# Patient Record
Sex: MALE | Race: BLACK OR AFRICAN AMERICAN | NOT HISPANIC OR LATINO | URBAN - METROPOLITAN AREA
[De-identification: names, ages, dates, MRNs, and addresses within clinical notes are randomized per-mention and may not be internally consistent; named-entity substitution may affect disease eponyms.]

---

## 2024-08-16 ENCOUNTER — EMERGENCY (EMERGENCY)
Facility: HOSPITAL | Age: 69
LOS: 0 days | Discharge: HOME | End: 2024-08-16
Attending: STUDENT IN AN ORGANIZED HEALTH CARE EDUCATION/TRAINING PROGRAM
Payer: MEDICARE

## 2024-08-16 VITALS
OXYGEN SATURATION: 96 % | HEART RATE: 58 BPM | RESPIRATION RATE: 11 BRPM | TEMPERATURE: 98 F | DIASTOLIC BLOOD PRESSURE: 85 MMHG | SYSTOLIC BLOOD PRESSURE: 143 MMHG

## 2024-08-16 VITALS
TEMPERATURE: 99 F | HEIGHT: 72 IN | HEART RATE: 65 BPM | SYSTOLIC BLOOD PRESSURE: 142 MMHG | RESPIRATION RATE: 18 BRPM | OXYGEN SATURATION: 98 % | DIASTOLIC BLOOD PRESSURE: 84 MMHG | WEIGHT: 186.95 LBS

## 2024-08-16 DIAGNOSIS — R07.9 CHEST PAIN, UNSPECIFIED: ICD-10-CM

## 2024-08-16 LAB
ALBUMIN SERPL ELPH-MCNC: 3.3 G/DL — SIGNIFICANT CHANGE UP (ref 3.3–5)
ALP SERPL-CCNC: 96 U/L — SIGNIFICANT CHANGE UP (ref 40–120)
ALT FLD-CCNC: 22 U/L — SIGNIFICANT CHANGE UP (ref 12–78)
ANION GAP SERPL CALC-SCNC: 6 MMOL/L — SIGNIFICANT CHANGE UP (ref 5–17)
AST SERPL-CCNC: 14 U/L — LOW (ref 15–37)
BASOPHILS # BLD AUTO: 0.03 K/UL — SIGNIFICANT CHANGE UP (ref 0–0.2)
BASOPHILS NFR BLD AUTO: 0.4 % — SIGNIFICANT CHANGE UP (ref 0–2)
BILIRUB SERPL-MCNC: 0.3 MG/DL — SIGNIFICANT CHANGE UP (ref 0.2–1.2)
BUN SERPL-MCNC: 17 MG/DL — SIGNIFICANT CHANGE UP (ref 7–23)
CALCIUM SERPL-MCNC: 9.1 MG/DL — SIGNIFICANT CHANGE UP (ref 8.5–10.1)
CHLORIDE SERPL-SCNC: 110 MMOL/L — HIGH (ref 96–108)
CO2 SERPL-SCNC: 24 MMOL/L — SIGNIFICANT CHANGE UP (ref 22–31)
CREAT SERPL-MCNC: 0.86 MG/DL — SIGNIFICANT CHANGE UP (ref 0.5–1.3)
EGFR: 93 ML/MIN/1.73M2 — SIGNIFICANT CHANGE UP
EOSINOPHIL # BLD AUTO: 0.16 K/UL — SIGNIFICANT CHANGE UP (ref 0–0.5)
EOSINOPHIL NFR BLD AUTO: 2.1 % — SIGNIFICANT CHANGE UP (ref 0–6)
GLUCOSE SERPL-MCNC: 95 MG/DL — SIGNIFICANT CHANGE UP (ref 70–99)
HCT VFR BLD CALC: 40.5 % — SIGNIFICANT CHANGE UP (ref 39–50)
HGB BLD-MCNC: 13.5 G/DL — SIGNIFICANT CHANGE UP (ref 13–17)
HIV 1 & 2 AB SERPL IA.RAPID: SIGNIFICANT CHANGE UP
IMM GRANULOCYTES NFR BLD AUTO: 0.3 % — SIGNIFICANT CHANGE UP (ref 0–0.9)
LYMPHOCYTES # BLD AUTO: 2.12 K/UL — SIGNIFICANT CHANGE UP (ref 1–3.3)
LYMPHOCYTES # BLD AUTO: 28 % — SIGNIFICANT CHANGE UP (ref 13–44)
MCHC RBC-ENTMCNC: 27.8 PG — SIGNIFICANT CHANGE UP (ref 27–34)
MCHC RBC-ENTMCNC: 33.3 G/DL — SIGNIFICANT CHANGE UP (ref 32–36)
MCV RBC AUTO: 83.5 FL — SIGNIFICANT CHANGE UP (ref 80–100)
MONOCYTES # BLD AUTO: 0.66 K/UL — SIGNIFICANT CHANGE UP (ref 0–0.9)
MONOCYTES NFR BLD AUTO: 8.7 % — SIGNIFICANT CHANGE UP (ref 2–14)
NEUTROPHILS # BLD AUTO: 4.59 K/UL — SIGNIFICANT CHANGE UP (ref 1.8–7.4)
NEUTROPHILS NFR BLD AUTO: 60.5 % — SIGNIFICANT CHANGE UP (ref 43–77)
NRBC # BLD: 0 /100 WBCS — SIGNIFICANT CHANGE UP (ref 0–0)
PLATELET # BLD AUTO: 244 K/UL — SIGNIFICANT CHANGE UP (ref 150–400)
POTASSIUM SERPL-MCNC: 4.2 MMOL/L — SIGNIFICANT CHANGE UP (ref 3.5–5.3)
POTASSIUM SERPL-SCNC: 4.2 MMOL/L — SIGNIFICANT CHANGE UP (ref 3.5–5.3)
PROT SERPL-MCNC: 7.1 GM/DL — SIGNIFICANT CHANGE UP (ref 6–8.3)
RBC # BLD: 4.85 M/UL — SIGNIFICANT CHANGE UP (ref 4.2–5.8)
RBC # FLD: 13.4 % — SIGNIFICANT CHANGE UP (ref 10.3–14.5)
SODIUM SERPL-SCNC: 140 MMOL/L — SIGNIFICANT CHANGE UP (ref 135–145)
TROPONIN I, HIGH SENSITIVITY RESULT: 6.9 NG/L — SIGNIFICANT CHANGE UP
WBC # BLD: 7.58 K/UL — SIGNIFICANT CHANGE UP (ref 3.8–10.5)
WBC # FLD AUTO: 7.58 K/UL — SIGNIFICANT CHANGE UP (ref 3.8–10.5)

## 2024-08-16 PROCEDURE — 93010 ELECTROCARDIOGRAM REPORT: CPT

## 2024-08-16 PROCEDURE — 99285 EMERGENCY DEPT VISIT HI MDM: CPT

## 2024-08-16 PROCEDURE — 71045 X-RAY EXAM CHEST 1 VIEW: CPT | Mod: 26

## 2024-08-16 NOTE — ED PROVIDER NOTE - CARE PROVIDER_API CALL
Jaspreet Talbot  Internal Medicine  [address] [place] [address]  Phone: [phone]  Fax: [phone]  Follow Up Time: 4-6 Days    Marc Ac  Cardiovascular Disease  [address] [place] [address]  Phone: [phone]  Fax: [phone]  Follow Up Time: 7-10 Days    Ernesto Silverio  Cardiovascular Disease  [address] [place] [address]  Phone: [phone]  Fax: [phone]  Follow Up Time: 7-10 Days

## 2024-08-16 NOTE — ED PROVIDER NOTE - PHYSICAL EXAMINATION
General: Well appearing male in no acute distress  HEENT: Normocephalic, atraumatic. Moist mucous membranes. Oropharynx clear. No lymphadenopathy.  Eyes: No scleral icterus. EOMI. ANNALISE.  Neck:. Soft and supple. Full ROM without pain. No midline tenderness  Cardiac: Regular rate and regular rhythm. No murmurs, rubs, gallops. Peripheral pulses 2+ and symmetric. No LE edema.  Resp: Lungs CTAB. Speaking in full sentences. No wheezes, rales or rhonchi.  Abd: Soft, non-tender, non-distended. No guarding or rebound. No scars, masses, or lesions.  Back: Spine midline and non-tender. No CVA tenderness.    Skin: No rashes, abrasions, or lacerations.  Neuro: AO x 3. Moves all extremities symmetrically. Motor strength and sensation grossly intact.

## 2024-08-16 NOTE — ED ADULT TRIAGE NOTE - WEIGHT METHOD
The patient has been having increasing episodes of atrial fibrillation which now become much more symptomatic.  Options were discussed including change antiarrhythmics as well as repeat catheter ablation.  His only reasonable option for antiarrhythmic is probably amiodarone.    He is interested in considering repeat catheter ablation.  Discussed the procedure in detail and that its not clear how involved the procedure would be until we get see if there is recurrences at previous sites of ablation.  Although some of his increased burden can be attributed to increased stress recently, his overall burden of atrial fibrillation has been slowly increasing for quite some time.    Patient rested in proceeding with catheter ablation.  This will be arranged in near future.   actual

## 2024-08-16 NOTE — ED PROVIDER NOTE - CARE PROVIDERS DIRECT ADDRESSES
[email or web address]

## 2024-08-16 NOTE — ED PROVIDER NOTE - OBJECTIVE STATEMENT
70 y/o M no pmhx presents w/ chest pain for past few days. states it is present predominantly when he wakes up. denies sob. denies abdominal pain. denies fever/chills. denies trauma. no active chest pain. states pain goes way during the day. states he is active person - ran a marathon.

## 2024-08-16 NOTE — ED PROVIDER NOTE - PROVIDER TOKENS
PROVIDER:[TOKEN:[[id number]],FOLLOWUP:[4-6 Days]],PROVIDER:[TOKEN:[[id number]],FOLLOWUP:[7-10 Days]],PROVIDER:[TOKEN:[[id number]],FOLLOWUP:[7-10 Days]]

## 2024-08-16 NOTE — ED PROVIDER NOTE - NSFOLLOWUPINSTRUCTIONS_ED_ALL_ED_FT
followup with primary care/ cardiologist in next 1-7 days    Chest Pain    Chest pain can be caused by many different conditions which may or may not be dangerous. Causes include heartburn, lung infections, heart attack, blood clot in lungs, skin infections, strain or damage to muscle, cartilage, or bones, etc. In addition to a history and physical examination, an electrocardiogram (ECG) or other lab tests may have been performed to determine the cause of your chest pain. Follow up with your primary care provider or with a cardiologist as instructed.     SEEK IMMEDIATE MEDICAL CARE IF YOU HAVE ANY OF THE FOLLOWING SYMPTOMS: worsening chest pain, coughing up blood, unexplained back/neck/jaw pain, severe abdominal pain, dizziness or lightheadedness, fainting, shortness of breath, sweaty or clammy skin, vomiting, or racing heart beat. These symptoms may represent a serious problem that is an emergency. Do not wait to see if the symptoms will go away. Get medical help right away. Call 911 and do not drive yourself to the hospital.

## 2024-08-16 NOTE — ED ADULT NURSE NOTE - OBJECTIVE STATEMENT
Patient reports "for past couple days, when I wake up, I have left sided chest pain and it feels like heartburn." No pain currently. Denies PMH. Last episode this morning.

## 2024-08-16 NOTE — ED PROVIDER NOTE - PATIENT PORTAL LINK FT
You can access the FollowMyHealth Patient Portal offered by [facility] by registering at the following website: [email or web address]. By joining [employer]’s FollowMyHealth portal, you will also be able to view your health information using other applications (apps) compatible with our system.

## 2024-08-16 NOTE — ED ADULT NURSE NOTE - NSFALLUNIVINTERV_ED_ALL_ED
Bed/Stretcher in lowest position, wheels locked, appropriate side rails in place/Call bell, personal items and telephone in reach/Instruct patient to call for assistance before getting out of bed/chair/stretcher/Non-slip footwear applied when patient is off stretcher/[place] to call system/Physically safe environment - no spills, clutter or unnecessary equipment/Purposeful proactive rounding/Room/bathroom lighting operational, light cord in reach

## 2024-08-16 NOTE — ED PROVIDER NOTE - CLINICAL SUMMARY MEDICAL DECISION MAKING FREE TEXT BOX
68 y/o M no pmhx presents w/ chest pain for past few days. states it is present predominantly when he wakes up. denies sob. denies abdominal pain. denies fever/chills. denies trauma. no active chest pain. states pain goes way during the day. states he is active person - ran a marathon.  EKG NSR, no stemi  low suspicion of acs  consider msk  check cxr - r/o pna/ptx   check labs  heart score is low. 68 y/o M no pmhx presents w/ chest pain for past few days. states it is present predominantly when he wakes up. denies sob. denies abdominal pain. denies fever/chills. denies trauma. no active chest pain. states pain goes way during the day. states he is active person - ran a marathon.  EKG NSR, no stemi  low suspicion of acs  consider msk  check cxr - r/o pna/ptx   check labs  heart score is low.    no active chest pain. results reviewed. heart score. pcp/cards f/u emphasized.   Conversation had with patient regarding results of testing. Patient agrees with plan for discharge at this time. Patient agrees to comply with follow up with PCP. Return to ED precautions and discharge instructions given to patient.

## 2024-08-17 LAB
HCV AB S/CO SERPL IA: 0.11 S/CO — SIGNIFICANT CHANGE UP (ref 0–0.99)
HCV AB SERPL-IMP: SIGNIFICANT CHANGE UP

## 2024-08-21 ENCOUNTER — APPOINTMENT (OUTPATIENT)
Dept: CARDIOLOGY | Facility: CLINIC | Age: 69
End: 2024-08-21

## 2024-08-28 ENCOUNTER — NON-APPOINTMENT (OUTPATIENT)
Age: 69
End: 2024-08-28

## 2024-08-28 ENCOUNTER — APPOINTMENT (OUTPATIENT)
Dept: CARDIOLOGY | Facility: CLINIC | Age: 69
End: 2024-08-28
Payer: MEDICARE

## 2024-08-28 VITALS
WEIGHT: 187 LBS | TEMPERATURE: 98 F | BODY MASS INDEX: 25.33 KG/M2 | SYSTOLIC BLOOD PRESSURE: 143 MMHG | HEART RATE: 88 BPM | DIASTOLIC BLOOD PRESSURE: 92 MMHG | OXYGEN SATURATION: 99 % | HEIGHT: 72 IN

## 2024-08-28 DIAGNOSIS — R94.31 ABNORMAL ELECTROCARDIOGRAM [ECG] [EKG]: ICD-10-CM

## 2024-08-28 DIAGNOSIS — R07.89 OTHER CHEST PAIN: ICD-10-CM

## 2024-08-28 PROBLEM — Z00.00 ENCOUNTER FOR PREVENTIVE HEALTH EXAMINATION: Status: ACTIVE | Noted: 2024-08-28

## 2024-08-28 PROCEDURE — 99204 OFFICE O/P NEW MOD 45 MIN: CPT

## 2024-08-28 PROCEDURE — 93000 ELECTROCARDIOGRAM COMPLETE: CPT

## 2024-08-28 NOTE — REVIEW OF SYSTEMS
[Fever] : no fever [Headache] : no headache [Weight Gain (___ Lbs)] : no recent weight gain [Chills] : no chills [Feeling Fatigued] : not feeling fatigued [Weight Loss (___ Lbs)] : no recent weight loss [Blurry Vision] : no blurred vision [SOB] : no shortness of breath [Dyspnea on exertion] : not dyspnea during exertion [Chest Discomfort] : chest discomfort [Lower Ext Edema] : no extremity edema [Palpitations] : no palpitations [Orthopnea] : no orthopnea [Syncope] : no syncope [Cough] : no cough [Wheezing] : no wheezing [Nausea] : no nausea [Vomiting] : no vomiting [Dizziness] : no dizziness [Confusion] : no confusion was observed [Easy Bleeding] : no tendency for easy bleeding [Easy Bruising] : no tendency for easy bruising [FreeTextEntry5] : see hpi

## 2024-08-28 NOTE — HISTORY OF PRESENT ILLNESS
[FreeTextEntry1] : 69M presents to establish care Sent in by: PMD:  pt states last month he experienced a few days of cp on the left side with RUE numbness when he woke up in the morning. symptoms were intermittent, and pt attributed it to heartburn, then completely resolved. last episode was a month ago pt did go to the ER with these symptoms and reports he was told he was fine and sent home  no CP, SOB on exertion. Denies palpitations, dizziness, diaphoresis, syncope, LE edema, orthopnea pt is a marathon runner, but hasn't run over the past month bc he was nervous  Exercise: long distance runner Diet: none  Prev cardiac history: none Previous cardiac testing: echo/stress 2019, normal per pt  Recent labs:  EKG:  Med hx: none	 Sx hx: knee sx 2009	 Family hx: no known cardiac hx Social hx:  lives in [place] with wife, retired fashion industry. no tob/etoh/drugs Meds: none Allergies: nkda

## 2024-08-28 NOTE — DISCUSSION/SUMMARY
[FreeTextEntry1] : 69M presents to establish care  CP -atypical -unclear if on exertion, pt stopped exercising when symptoms started as he was nervous -no active CP -pt comfortable appearing and euvolemic -ekg showing SR with non specific st cahges inferiorly  -will check TTE to r/o structural heart dz -will check exercise stress test to r/o ischemia  f/u after initial testing 50 min spent on complete encounter.    [EKG obtained to assist in diagnosis and management of assessed problem(s)] : EKG obtained to assist in diagnosis and management of assessed problem(s)

## 2024-09-19 ENCOUNTER — APPOINTMENT (OUTPATIENT)
Dept: CARDIOLOGY | Facility: CLINIC | Age: 69
End: 2024-09-19

## 2024-11-10 ENCOUNTER — INPATIENT (INPATIENT)
Facility: HOSPITAL | Age: 69
LOS: 3 days | Discharge: HOME HEALTH | End: 2024-11-14
Attending: STUDENT IN AN ORGANIZED HEALTH CARE EDUCATION/TRAINING PROGRAM | Admitting: STUDENT IN AN ORGANIZED HEALTH CARE EDUCATION/TRAINING PROGRAM
Payer: MEDICARE

## 2024-11-10 VITALS
SYSTOLIC BLOOD PRESSURE: 131 MMHG | TEMPERATURE: 98 F | DIASTOLIC BLOOD PRESSURE: 87 MMHG | WEIGHT: 186.07 LBS | OXYGEN SATURATION: 100 % | HEART RATE: 151 BPM | RESPIRATION RATE: 16 BRPM

## 2024-11-10 DIAGNOSIS — I48.92 UNSPECIFIED ATRIAL FLUTTER: ICD-10-CM

## 2024-11-10 DIAGNOSIS — I50.21 ACUTE SYSTOLIC (CONGESTIVE) HEART FAILURE: ICD-10-CM

## 2024-11-10 DIAGNOSIS — Z29.9 ENCOUNTER FOR PROPHYLACTIC MEASURES, UNSPECIFIED: ICD-10-CM

## 2024-11-10 DIAGNOSIS — R74.01 ELEVATION OF LEVELS OF LIVER TRANSAMINASE LEVELS: ICD-10-CM

## 2024-11-10 DIAGNOSIS — R55 SYNCOPE AND COLLAPSE: ICD-10-CM

## 2024-11-10 LAB
A1C WITH ESTIMATED AVERAGE GLUCOSE RESULT: 5.7 % — HIGH (ref 4–5.6)
ADD ON TEST-SPECIMEN IN LAB: SIGNIFICANT CHANGE UP
ALBUMIN SERPL ELPH-MCNC: 3.6 G/DL — SIGNIFICANT CHANGE UP (ref 3.3–5)
ALBUMIN SERPL ELPH-MCNC: 3.9 G/DL — SIGNIFICANT CHANGE UP (ref 3.3–5)
ALP SERPL-CCNC: 103 U/L — SIGNIFICANT CHANGE UP (ref 40–120)
ALP SERPL-CCNC: 110 U/L — SIGNIFICANT CHANGE UP (ref 40–120)
ALT FLD-CCNC: 89 U/L — HIGH (ref 4–41)
ALT FLD-CCNC: 91 U/L — HIGH (ref 4–41)
ANION GAP SERPL CALC-SCNC: 12 MMOL/L — SIGNIFICANT CHANGE UP (ref 7–14)
ANION GAP SERPL CALC-SCNC: 14 MMOL/L — SIGNIFICANT CHANGE UP (ref 7–14)
APTT BLD: 29.5 SEC — SIGNIFICANT CHANGE UP (ref 24.5–35.6)
AST SERPL-CCNC: 63 U/L — HIGH (ref 4–40)
AST SERPL-CCNC: 66 U/L — HIGH (ref 4–40)
BASOPHILS # BLD AUTO: 0.02 K/UL — SIGNIFICANT CHANGE UP (ref 0–0.2)
BASOPHILS # BLD AUTO: 0.04 K/UL — SIGNIFICANT CHANGE UP (ref 0–0.2)
BASOPHILS NFR BLD AUTO: 0.3 % — SIGNIFICANT CHANGE UP (ref 0–2)
BASOPHILS NFR BLD AUTO: 0.6 % — SIGNIFICANT CHANGE UP (ref 0–2)
BILIRUB SERPL-MCNC: 0.8 MG/DL — SIGNIFICANT CHANGE UP (ref 0.2–1.2)
BILIRUB SERPL-MCNC: 1 MG/DL — SIGNIFICANT CHANGE UP (ref 0.2–1.2)
BUN SERPL-MCNC: 19 MG/DL — SIGNIFICANT CHANGE UP (ref 7–23)
BUN SERPL-MCNC: 22 MG/DL — SIGNIFICANT CHANGE UP (ref 7–23)
CALCIUM SERPL-MCNC: 8.9 MG/DL — SIGNIFICANT CHANGE UP (ref 8.4–10.5)
CALCIUM SERPL-MCNC: 9 MG/DL — SIGNIFICANT CHANGE UP (ref 8.4–10.5)
CHLORIDE SERPL-SCNC: 106 MMOL/L — SIGNIFICANT CHANGE UP (ref 98–107)
CHLORIDE SERPL-SCNC: 107 MMOL/L — SIGNIFICANT CHANGE UP (ref 98–107)
CHOLEST SERPL-MCNC: 144 MG/DL — SIGNIFICANT CHANGE UP
CO2 SERPL-SCNC: 20 MMOL/L — LOW (ref 22–31)
CO2 SERPL-SCNC: 21 MMOL/L — LOW (ref 22–31)
CREAT SERPL-MCNC: 1.1 MG/DL — SIGNIFICANT CHANGE UP (ref 0.5–1.3)
CREAT SERPL-MCNC: 1.16 MG/DL — SIGNIFICANT CHANGE UP (ref 0.5–1.3)
D DIMER BLD IA.RAPID-MCNC: 336 NG/ML DDU — HIGH
EGFR: 68 ML/MIN/1.73M2 — SIGNIFICANT CHANGE UP
EGFR: 72 ML/MIN/1.73M2 — SIGNIFICANT CHANGE UP
EOSINOPHIL # BLD AUTO: 0.02 K/UL — SIGNIFICANT CHANGE UP (ref 0–0.5)
EOSINOPHIL # BLD AUTO: 0.05 K/UL — SIGNIFICANT CHANGE UP (ref 0–0.5)
EOSINOPHIL NFR BLD AUTO: 0.3 % — SIGNIFICANT CHANGE UP (ref 0–6)
EOSINOPHIL NFR BLD AUTO: 0.7 % — SIGNIFICANT CHANGE UP (ref 0–6)
ESTIMATED AVERAGE GLUCOSE: 117 — SIGNIFICANT CHANGE UP
FLUAV AG NPH QL: SIGNIFICANT CHANGE UP
FLUBV AG NPH QL: SIGNIFICANT CHANGE UP
GLUCOSE SERPL-MCNC: 123 MG/DL — HIGH (ref 70–99)
GLUCOSE SERPL-MCNC: 148 MG/DL — HIGH (ref 70–99)
HCT VFR BLD CALC: 41.9 % — SIGNIFICANT CHANGE UP (ref 39–50)
HCT VFR BLD CALC: 43.4 % — SIGNIFICANT CHANGE UP (ref 39–50)
HDLC SERPL-MCNC: 32 MG/DL — LOW
HGB BLD-MCNC: 13.4 G/DL — SIGNIFICANT CHANGE UP (ref 13–17)
HGB BLD-MCNC: 13.8 G/DL — SIGNIFICANT CHANGE UP (ref 13–17)
IANC: 4.77 K/UL — SIGNIFICANT CHANGE UP (ref 1.8–7.4)
IANC: 5.58 K/UL — SIGNIFICANT CHANGE UP (ref 1.8–7.4)
IMM GRANULOCYTES NFR BLD AUTO: 0.1 % — SIGNIFICANT CHANGE UP (ref 0–0.9)
IMM GRANULOCYTES NFR BLD AUTO: 0.3 % — SIGNIFICANT CHANGE UP (ref 0–0.9)
INR BLD: 1.16 RATIO — SIGNIFICANT CHANGE UP (ref 0.85–1.16)
LIPID PNL WITH DIRECT LDL SERPL: 90 MG/DL — SIGNIFICANT CHANGE UP
LYMPHOCYTES # BLD AUTO: 1.15 K/UL — SIGNIFICANT CHANGE UP (ref 1–3.3)
LYMPHOCYTES # BLD AUTO: 1.69 K/UL — SIGNIFICANT CHANGE UP (ref 1–3.3)
LYMPHOCYTES # BLD AUTO: 16.1 % — SIGNIFICANT CHANGE UP (ref 13–44)
LYMPHOCYTES # BLD AUTO: 23.7 % — SIGNIFICANT CHANGE UP (ref 13–44)
MAGNESIUM SERPL-MCNC: 1.8 MG/DL — SIGNIFICANT CHANGE UP (ref 1.6–2.6)
MAGNESIUM SERPL-MCNC: 1.8 MG/DL — SIGNIFICANT CHANGE UP (ref 1.6–2.6)
MCHC RBC-ENTMCNC: 26.4 PG — LOW (ref 27–34)
MCHC RBC-ENTMCNC: 27.1 PG — SIGNIFICANT CHANGE UP (ref 27–34)
MCHC RBC-ENTMCNC: 30.9 G/DL — LOW (ref 32–36)
MCHC RBC-ENTMCNC: 32.9 G/DL — SIGNIFICANT CHANGE UP (ref 32–36)
MCV RBC AUTO: 82.3 FL — SIGNIFICANT CHANGE UP (ref 80–100)
MCV RBC AUTO: 85.4 FL — SIGNIFICANT CHANGE UP (ref 80–100)
MONOCYTES # BLD AUTO: 0.35 K/UL — SIGNIFICANT CHANGE UP (ref 0–0.9)
MONOCYTES # BLD AUTO: 0.58 K/UL — SIGNIFICANT CHANGE UP (ref 0–0.9)
MONOCYTES NFR BLD AUTO: 4.9 % — SIGNIFICANT CHANGE UP (ref 2–14)
MONOCYTES NFR BLD AUTO: 8.1 % — SIGNIFICANT CHANGE UP (ref 2–14)
NEUTROPHILS # BLD AUTO: 4.77 K/UL — SIGNIFICANT CHANGE UP (ref 1.8–7.4)
NEUTROPHILS # BLD AUTO: 5.58 K/UL — SIGNIFICANT CHANGE UP (ref 1.8–7.4)
NEUTROPHILS NFR BLD AUTO: 66.8 % — SIGNIFICANT CHANGE UP (ref 43–77)
NEUTROPHILS NFR BLD AUTO: 78.1 % — HIGH (ref 43–77)
NON HDL CHOLESTEROL: 112 MG/DL — SIGNIFICANT CHANGE UP
NRBC # BLD: 0 /100 WBCS — SIGNIFICANT CHANGE UP (ref 0–0)
NRBC # BLD: 0 /100 WBCS — SIGNIFICANT CHANGE UP (ref 0–0)
NRBC # FLD: 0 K/UL — SIGNIFICANT CHANGE UP (ref 0–0)
NRBC # FLD: 0 K/UL — SIGNIFICANT CHANGE UP (ref 0–0)
NT-PROBNP SERPL-SCNC: 1831 PG/ML — HIGH
PHOSPHATE SERPL-MCNC: 3.8 MG/DL — SIGNIFICANT CHANGE UP (ref 2.5–4.5)
PLATELET # BLD AUTO: 212 K/UL — SIGNIFICANT CHANGE UP (ref 150–400)
PLATELET # BLD AUTO: 228 K/UL — SIGNIFICANT CHANGE UP (ref 150–400)
POTASSIUM SERPL-MCNC: 4.2 MMOL/L — SIGNIFICANT CHANGE UP (ref 3.5–5.3)
POTASSIUM SERPL-MCNC: 4.8 MMOL/L — SIGNIFICANT CHANGE UP (ref 3.5–5.3)
POTASSIUM SERPL-SCNC: 4.2 MMOL/L — SIGNIFICANT CHANGE UP (ref 3.5–5.3)
POTASSIUM SERPL-SCNC: 4.8 MMOL/L — SIGNIFICANT CHANGE UP (ref 3.5–5.3)
PROT SERPL-MCNC: 6.7 G/DL — SIGNIFICANT CHANGE UP (ref 6–8.3)
PROT SERPL-MCNC: 7 G/DL — SIGNIFICANT CHANGE UP (ref 6–8.3)
PROTHROM AB SERPL-ACNC: 13.4 SEC — SIGNIFICANT CHANGE UP (ref 9.9–13.4)
RBC # BLD: 5.08 M/UL — SIGNIFICANT CHANGE UP (ref 4.2–5.8)
RBC # BLD: 5.09 M/UL — SIGNIFICANT CHANGE UP (ref 4.2–5.8)
RBC # FLD: 13.7 % — SIGNIFICANT CHANGE UP (ref 10.3–14.5)
RBC # FLD: 14 % — SIGNIFICANT CHANGE UP (ref 10.3–14.5)
RSV RNA NPH QL NAA+NON-PROBE: SIGNIFICANT CHANGE UP
SARS-COV-2 RNA SPEC QL NAA+PROBE: SIGNIFICANT CHANGE UP
SODIUM SERPL-SCNC: 139 MMOL/L — SIGNIFICANT CHANGE UP (ref 135–145)
SODIUM SERPL-SCNC: 141 MMOL/L — SIGNIFICANT CHANGE UP (ref 135–145)
T4 FREE SERPL-MCNC: 1.3 NG/DL — SIGNIFICANT CHANGE UP (ref 0.9–1.8)
TRIGL SERPL-MCNC: 109 MG/DL — SIGNIFICANT CHANGE UP
TROPONIN T, HIGH SENSITIVITY RESULT: 13 NG/L — SIGNIFICANT CHANGE UP
TSH SERPL-MCNC: 0.72 UIU/ML — SIGNIFICANT CHANGE UP (ref 0.27–4.2)
TSH SERPL-MCNC: 1.02 UIU/ML — SIGNIFICANT CHANGE UP (ref 0.27–4.2)
WBC # BLD: 7.14 K/UL — SIGNIFICANT CHANGE UP (ref 3.8–10.5)
WBC # BLD: 7.14 K/UL — SIGNIFICANT CHANGE UP (ref 3.8–10.5)
WBC # FLD AUTO: 7.14 K/UL — SIGNIFICANT CHANGE UP (ref 3.8–10.5)
WBC # FLD AUTO: 7.14 K/UL — SIGNIFICANT CHANGE UP (ref 3.8–10.5)

## 2024-11-10 PROCEDURE — 71045 X-RAY EXAM CHEST 1 VIEW: CPT | Mod: 26

## 2024-11-10 PROCEDURE — 99291 CRITICAL CARE FIRST HOUR: CPT

## 2024-11-10 PROCEDURE — 99222 1ST HOSP IP/OBS MODERATE 55: CPT

## 2024-11-10 RX ORDER — HEPARIN SODIUM 10000 [USP'U]/ML
3500 INJECTION INTRAVENOUS; SUBCUTANEOUS EVERY 6 HOURS
Refills: 0 | Status: DISCONTINUED | OUTPATIENT
Start: 2024-11-10 | End: 2024-11-11

## 2024-11-10 RX ORDER — FUROSEMIDE 40 MG
20 TABLET ORAL
Refills: 0 | Status: DISCONTINUED | OUTPATIENT
Start: 2024-11-11 | End: 2024-11-11

## 2024-11-10 RX ORDER — METOPROLOL TARTRATE 50 MG
25 TABLET ORAL ONCE
Refills: 0 | Status: COMPLETED | OUTPATIENT
Start: 2024-11-10 | End: 2024-11-10

## 2024-11-10 RX ORDER — HEPARIN SODIUM 10000 [USP'U]/ML
7000 INJECTION INTRAVENOUS; SUBCUTANEOUS EVERY 6 HOURS
Refills: 0 | Status: DISCONTINUED | OUTPATIENT
Start: 2024-11-10 | End: 2024-11-11

## 2024-11-10 RX ORDER — METOPROLOL TARTRATE 50 MG
5 TABLET ORAL ONCE
Refills: 0 | Status: COMPLETED | OUTPATIENT
Start: 2024-11-10 | End: 2024-11-10

## 2024-11-10 RX ORDER — FUROSEMIDE 40 MG
20 TABLET ORAL ONCE
Refills: 0 | Status: COMPLETED | OUTPATIENT
Start: 2024-11-10 | End: 2024-11-10

## 2024-11-10 RX ORDER — ONDANSETRON HYDROCHLORIDE 2 MG/ML
4 INJECTION, SOLUTION INTRAMUSCULAR; INTRAVENOUS ONCE
Refills: 0 | Status: COMPLETED | OUTPATIENT
Start: 2024-11-10 | End: 2024-11-10

## 2024-11-10 RX ORDER — MAGNESIUM SULFATE IN 0.9% NACL 2 G/50 ML
2 INTRAVENOUS SOLUTION, PIGGYBACK (ML) INTRAVENOUS ONCE
Refills: 0 | Status: COMPLETED | OUTPATIENT
Start: 2024-11-10 | End: 2024-11-10

## 2024-11-10 RX ORDER — HEPARIN SODIUM 10000 [USP'U]/ML
INJECTION INTRAVENOUS; SUBCUTANEOUS
Qty: 25000 | Refills: 0 | Status: DISCONTINUED | OUTPATIENT
Start: 2024-11-10 | End: 2024-11-11

## 2024-11-10 RX ORDER — HEPARIN SODIUM 10000 [USP'U]/ML
7000 INJECTION INTRAVENOUS; SUBCUTANEOUS ONCE
Refills: 0 | Status: COMPLETED | OUTPATIENT
Start: 2024-11-10 | End: 2024-11-10

## 2024-11-10 RX ORDER — METOPROLOL TARTRATE 50 MG
25 TABLET ORAL THREE TIMES A DAY
Refills: 0 | Status: DISCONTINUED | OUTPATIENT
Start: 2024-11-10 | End: 2024-11-11

## 2024-11-10 RX ORDER — INFLUENZ VIR VAC TV P-SURF2003 15MCG/.5ML
0.5 SYRINGE (ML) INTRAMUSCULAR ONCE
Refills: 0 | Status: DISCONTINUED | OUTPATIENT
Start: 2024-11-10 | End: 2024-11-14

## 2024-11-10 RX ADMIN — HEPARIN SODIUM 7000 UNIT(S): 10000 INJECTION INTRAVENOUS; SUBCUTANEOUS at 20:11

## 2024-11-10 RX ADMIN — Medication 5 MILLIGRAM(S): at 11:26

## 2024-11-10 RX ADMIN — Medication 20 MILLIGRAM(S): at 14:49

## 2024-11-10 RX ADMIN — Medication 5 MILLIGRAM(S): at 12:09

## 2024-11-10 RX ADMIN — Medication 1000 MILLILITER(S): at 10:11

## 2024-11-10 RX ADMIN — HEPARIN SODIUM 1600 UNIT(S)/HR: 10000 INJECTION INTRAVENOUS; SUBCUTANEOUS at 20:05

## 2024-11-10 RX ADMIN — Medication 25 MILLIGRAM(S): at 12:09

## 2024-11-10 RX ADMIN — ONDANSETRON HYDROCHLORIDE 4 MILLIGRAM(S): 2 INJECTION, SOLUTION INTRAMUSCULAR; INTRAVENOUS at 14:50

## 2024-11-10 RX ADMIN — Medication 20 MILLIGRAM(S): at 17:08

## 2024-11-10 RX ADMIN — Medication 25 MILLIGRAM(S): at 21:19

## 2024-11-10 RX ADMIN — Medication 5 MILLIGRAM(S): at 13:00

## 2024-11-10 RX ADMIN — Medication 25 GRAM(S): at 18:23

## 2024-11-10 NOTE — CONSULT NOTE ADULT - ASSESSMENT
69 M, no past medical history, presents with intermittent chest pain and SOB x 2 weeks. Patient states he flew home from [place] last night, he had chest pain and SOB on the airplane, which he states "slowly went away" when he got home. Patient states he had similar episodes in the past, he went to his doctor with an unremarkable workup. Patient states he had a "cold" two weeks ago. Endorses nausea. Denies fever, vomiting, abdominal pain, dizziness, loss of consciousness.    EKG 2-1 flutter. No edema or erythremia noted on B/L lower extremities. Lungs clear on ausculation.       #Aflutter  -EKG shows 2-1 flutter  -Lasix 20mg IV push x1   -Echo ordered  -Give Cardizem 10mg IV push x 1  -Start Cardizem gtt   -Start Heparin gtt  -Will determine dispo after echo    69 M, no past medical history, presents with intermittent chest pain and SOB x 2 weeks. Patient states he flew home from [place] last night, he had chest pain and SOB on the airplane, which he states "slowly went away" when he got home. Patient states he had similar episodes in the past, he went to his doctor with an unremarkable workup. Patient states he had a "cold" two weeks ago. Endorses nausea. Denies fever, vomiting, abdominal pain, dizziness, loss of consciousness.    EKG 2-1 flutter. No edema or erythremia noted on B/L lower extremities. Lungs clear on ausculation.       #Aflutter  -Admit tele   -EKG shows 2-1 flutter  -Lasix 20mg IV push x1 given In ED   -Echo ordered  -Give Lopressor 25mg PO TID  -Start Lasix 20 IV BID   -Start Heparin gtt  -HF consult Monday

## 2024-11-10 NOTE — H&P ADULT - ATTENDING COMMENTS
In summary this is a 70 yo M admitted for Atrial flutter and new onset acute systolic heart failure.     Plan:   1. Atrial flutter. Heparin gtt. TTE. Lopressor 25mg PO TID. EP following.   2. Acute systolic HF. TTE 11/10 w EF 26%, dilated LV, LA, and RA, and severe MR. Euvolemic on exam. Lasix 20mg IVP BID. HF consult 11/11. Is/Os, daily weights, 1200cc/24hr fluid restriction.   3. Endorsed near syncope episodes since childhood. And also added that he notices he "dozes off" since retiring and didn't realize he fell asleep. He is concerned if an episode may occur while he is driving. No known seizure hx. Not on any bp meds at home, physically active at baseline. Marathon runner. Symptoms may be related to cardiac issues noted above, but CTH for further evaluation. And neuro consult in am.   4. Mild transaminitis. Trend, liver US if uptrending. Routine outpt f/u.     Rest of plan as detailed above.

## 2024-11-10 NOTE — H&P ADULT - PROBLEM SELECTOR PLAN 4
- mild transaminitis on labs in ED  - Hep C negative on prior admission    Plan:  > trend LFTs  > fu lipid panel  > Liver US if LFTs do not improve

## 2024-11-10 NOTE — H&P ADULT - NSHPLABSRESULTS_GEN_ALL_CORE
LABS:                         13.8   7.14  )-----------( 228      ( 10 Nov 2024 10:12 )             41.9     11-10    141  |  107  |  19  ----------------------------<  123[H]  4.2   |  20[L]  |  1.10    Ca    8.9      10 Nov 2024 10:12  Phos  3.5     11-10  Mg     1.80     11-10    TPro  7.0  /  Alb  3.9  /  TBili  0.8  /  DBili  x   /  AST  66[H]  /  ALT  91[H]  /  AlkPhos  110  11-10      Urinalysis Basic - ( 10 Nov 2024 10:12 )    Color: x / Appearance: x / SG: x / pH: x  Gluc: 123 mg/dL / Ketone: x  / Bili: x / Urobili: x   Blood: x / Protein: x / Nitrite: x   Leuk Esterase: x / RBC: x / WBC x   Sq Epi: x / Non Sq Epi: x / Bacteria: x          RADIOLOGY, EKG & ADDITIONAL TESTS:    < from: Xray Chest 1 View- PORTABLE-Urgent (11.10.24 @ 10:20) >    FINDINGS:  The heart size is not accurately assessed on this projection.  The lungs are clear.  There is no pneumothorax or pleural effusion.    IMPRESSION:  Clear lungs.    < end of copied text >    < from: TTE W or WO Ultrasound Enhancing Agent (11.10.24 @ 15:08) >    CONCLUSIONS:      1. Left ventricular systolic function is severely decreased with an ejection fraction of 26 % by Wolff's method of disks. Global left ventricular hypokinesis.   2. Enlarged right ventricular cavity size, with normal wall thickness, and severely reduced right ventricular systolic function. Tricuspid annular plane systolic excursion (TAPSE) is 1.2 cm (normal >=1.7 cm).   3. Left atrium is severely dilated.   4. The right atrium is severely dilated.   5. Severe mitral regurgitation.   6. Suggest re evaluate after rate/rhythm control.   7. Estimated pulmonary artery systolic pressure is 38 mmHg.   8. No prior echocardiogram is available for comparison.   9. Pulmonary artery systolic pressure may be underestimated due to severe tricuspid regurgitation.  10. The inferior vena cava is dilated measuring 2.93 cm in diameter, (dilated >2.1cm) with abnormal inspiratory collapse (abnormal <50%) consistent with elevated right atrialpressure (~15, range 10-20mmHg).  11. There is normal LV mass and concentric remodeling.    < end of copied text >

## 2024-11-10 NOTE — H&P ADULT - PROBLEM SELECTOR PLAN 3
Diet: Dash  DVT Proph: Heparin  Dispo: Pending course - pt reports episodes of presyncope since childhood       - pt feels dizziness (vertigo) followed by faintness for 30 seconds, episodes occur about once a year      - denies abnormal movements, urination, tongue biting     Plan:  > Management of flutter and heart failure as above  > fu CTH  > neuro consult in AM  > monitor on tele - pt reports episodes of presyncope since childhood  - pt feels dizziness (vertigo) followed by faintness for 30 seconds, episodes occur about once a year  - denies abnormal movements, urination, tongue biting     Plan:  > Management of flutter and heart failure as above  > fu CTH  > neuro consult in AM  > monitor on tele

## 2024-11-10 NOTE — ED ADULT NURSE NOTE - OBJECTIVE STATEMENT
68 y/o M arrives to E.D. rm 2 c/o intermittent chest discomfort, palpitations, and SOB x2 wks. Denies PHx. Pt is a&ox4, ambulatory, endorses SOB, RR even/unlabored, denies chest pain currently, + palpitations, neg N/V/D, denies fevers/cough/body aches. L 20g IV placed to forearm. Atrial flutter on tele, Denies ETOH/drug use. + recent travel from [place] via plane, aggravated sxs. MD Alvarez aware. Frequent monitoring in place.

## 2024-11-10 NOTE — ED ADULT NURSE REASSESSMENT NOTE - NS ED NURSE REASSESS COMMENT FT1
Pt received on stretcher A&Ox4, no labored breathing, pt is on 2l nc, tolerating well, pt is sinus tach on the monitor, denies any dizziness or palpitations at this time. Pt denies any pain or chest discomfort at this present time. Pt received on stretcher A&Ox4, no labored breathing, pt is on 2l nc, tolerating well, pt is tachy on the monitor, denies any dizziness or palpitations at this time. Pt denies any pain or chest discomfort at this present time.

## 2024-11-10 NOTE — ED PROVIDER NOTE - CLINICAL SUMMARY MEDICAL DECISION MAKING FREE TEXT BOX
69-year-old minimal past medical history presents with intermittent chest pain for the past 2 weeks now with worsening shortness of breath.  States had similar episode in the past year states he followed up with a cardiologist with unremarkable workup.  Denies fevers, abdominal pain, nausea, vomiting, urinary symptoms, lower extremity swelling or edema.  States recently flew back from [place].  Patient in 2-1 atrial flutter on EKG.  Lungs clear, abdomen nontender, no lower extremity edema.  Will evaluate for etiology of A-fib including electrolyte abnormality, TSH, PE, infection.  Labs, imaging, reassess. 69-year-old minimal past medical history presents with intermittent chest pain for the past 2 weeks now with worsening shortness of breath.  States had similar episode in the past year states he followed up with a cardiologist with unremarkable workup.  Denies fevers, abdominal pain, nausea, vomiting, urinary symptoms, lower extremity swelling or edema.  States recently flew back from [place].  Patient in 2-1 atrial flutter on EKG.  Lungs clear, abdomen nontender, no lower extremity edema.  Will evaluate for etiology of A-flutter including electrolyte abnormality, TSH, PE, infection.  Labs, imaging, reassess.

## 2024-11-10 NOTE — H&P ADULT - PROBLEM SELECTOR PLAN 5
Diet: Dash  DVT Proph: Heparin  Dispo: Pending course Diet: Dash 1200cc/24hr fluid restriction   DVT Proph: Heparin gtt  Dispo: Pending course

## 2024-11-10 NOTE — ED ADULT NURSE NOTE - PRIMARY CARE PROVIDER
- Likely secondary to IV SoluMedrol in the ER  
2/2 pneumonia - metapneumovirus positive, also with discrete left sided infiltrate on CXR, may be bacterial coinfection - check procal  Unasyn/azithro for now  Wheezing on exam, no hx lung dz, 1x dose solumedrol   covid, flu, rsv negative  Titrate off supplemental O2 as needed   RT consult, SVNs  
CXR- left midlung infiltrates   procalcitonin negative, however based on CXR, I suspect concurrent bacterial pna. Will recheck a procal tomorrow to get a trend - if it remains negative, can consider stopping antibiotics at that time  RT consult   
Continue home amlodipine, HCTZ   
Continue home armor thyroid  
Lactic 3.6 on admission   IVF   Trend   
Replace as needed  
unk

## 2024-11-10 NOTE — CONSULT NOTE ADULT - SUBJECTIVE AND OBJECTIVE BOX
CHIEF COMPLAINT: SOB    HISTORY OF PRESENT ILLNESS:  69 M, no past medical history, presents with intermittent chest pain and SOB x 2 weeks. Patient states he flew home from [place] last night, he had chest pain and SOB on the airplane, which he states "slowly went away" when he got home. Patient states he had similar episodes in the past, he went to his doctor with an unremarkable workup. Patient states he had a "cold" two weeks ago. Endorses nausea. Denies fever, vomiting, abdominal pain, dizziness.      EKG 2-1 flutter. No edema or erythremia noted on B/L lower extremities. Lungs clear on ausculation.       Allergies:  No Known Allergies    Intolerances:   No Known Intolerances.     	    MEDICATIONS:  furosemide   Injectable 20 milliGRAM(s) IV Push Once  ondansetron Injectable 4 milliGRAM(s) IV Push once            PAST MEDICAL & SURGICAL HISTORY:      FAMILY HISTORY:      SOCIAL HISTORY:    [x ] Non-smoker  [ ] Smoker  [ ] Alcohol  [ ] Denies Alcohol      REVIEW OF SYSTEMS:  CONSTITUTIONAL: no fevers or chills  EYES/ENT: No visual changes; No vertigo or throat pain  NECK: No mass  RESPIRATORY: Endorses SOB. No cough, wheezing, chills or hemoptysis.  CARDIOVASCULAR: Endorses Chest pain. Denies palpitations, passing out, dizziness, or leg swelling  GASTROINTESTINAL: Endorses Nausea. No abdominal or epigastric pain. No vomiting/melena  Genitourinary: No dysuria, frequency or hematuria  NEUROLOGICAL: No numbness or weakness  SKIN: No itching, burning, rashes or lesions      PHYSICAL EXAM:  T(C): 36.4 (11-10-24 @ 09:27), Max: 36.4 (11-10-24 @ 09:27)  HR: 143 (11-10-24 @ 13:43) (143 - 151)  BP: 99/86 (11-10-24 @ 13:43) (99/86 - 146/115)  RR: 17 (11-10-24 @ 13:43) (16 - 19)  SpO2: 98% (11-10-24 @ 13:43) (98% - 100%)    Vital Signs Last 24 Hrs  T(C): 36.4 (10 Nov 2024 09:27), Max: 36.4 (10 Nov 2024 09:27)  T(F): 97.6 (10 Nov 2024 09:27), Max: 97.6 (10 Nov 2024 09:27)  HR: 143 (10 Nov 2024 13:43) (143 - 151)  BP: 99/86 (10 Nov 2024 13:43) (99/86 - 146/115)  BP(mean): 93 (10 Nov 2024 13:43) (93 - 93)  RR: 17 (10 Nov 2024 13:43) (16 - 19)  SpO2: 98% (10 Nov 2024 13:43) (98% - 100%)  O2 Parameters below as of 10 Nov 2024 13:43  Patient On (Oxygen Delivery Method): room air          Appearance: Normal	  HEENT:   Normal oral mucosa	  Cardiovascular: Tachycardic Atrial Flutter on EKG, No JVD, No murmurs, No edema  Respiratory: Lungs clear to auscultation	  Psychiatry: A & O x 3, Mood & affect appropriate  Gastrointestinal:  Soft, Non-tender, + BS	  Skin: No rashes, No ecchymoses, No cyanosis	  Neurologic: Non-focal  Extremities: Warm and well perfused. 2+ pulses bilaterally        LABS:	 	    CBC Full  -  ( 10 Nov 2024 10:12 )  WBC Count : 7.14 K/uL  Hemoglobin : 13.8 g/dL  Hematocrit : 41.9 %  Platelet Count - Automated : 228 K/uL  Mean Cell Volume : 82.3 fL  Mean Cell Hemoglobin : 27.1 pg  Mean Cell Hemoglobin Concentration : 32.9 g/dL  Auto Neutrophil # : 4.77 K/uL  Auto Lymphocyte # : 1.69 K/uL  Auto Monocyte # : 0.58 K/uL  Auto Eosinophil # : 0.05 K/uL  Auto Basophil # : 0.04 K/uL  Auto Neutrophil % : 66.8 %  Auto Lymphocyte % : 23.7 %  Auto Monocyte % : 8.1 %  Auto Eosinophil % : 0.7 %  Auto Basophil % : 0.6 %    11-10    141  |  107  |  19  ----------------------------<  123[H]  4.2   |  20[L]  |  1.10    Ca    8.9      10 Nov 2024 10:12  Phos  3.5     11-10  Mg     1.80     11-10    TPro  7.0  /  Alb  3.9  /  TBili  0.8  /  DBili  x   /  AST  66[H]  /  ALT  91[H]  /  AlkPhos  110  11-10      proBNP:  1831  Lipid Profile:   HgA1c:   TSH: Serum: 1.02 uIU/mL (11-10 @ 10:12)      CARDIAC MARKERS:  HsT: 13                TELEMETRY: A flutter     ECG: A flutter  	    PREVIOUS DIAGNOSTIC TESTING:      	   CHIEF COMPLAINT: SOB    HISTORY OF PRESENT ILLNESS:  69 M, no past medical history, presents with intermittent chest pain and SOB x 2 weeks. Patient states he flew home from [place] last night, he had chest pain and SOB on the airplane, which he states "slowly went away" when he got home. Patient states he had similar episodes in the past, he went to his doctor with an unremarkable workup. Patient states he had a "cold" two weeks ago. Endorses nausea. Denies fever, vomiting, abdominal pain, dizziness.      EKG 2-1 flutter. No edema or erythremia noted on B/L lower extremities. Lungs clear on ausculation.       Allergies:  No Known Allergies    Intolerances:   No Known Intolerances.     	    MEDICATIONS:  furosemide   Injectable 20 milliGRAM(s) IV Push Once  ondansetron Injectable 4 milliGRAM(s) IV Push once            PAST MEDICAL & SURGICAL HISTORY:      FAMILY HISTORY:      SOCIAL HISTORY:    [x ] Non-smoker  [ ] Smoker  [ ] Alcohol  [ ] Denies Alcohol      REVIEW OF SYSTEMS:  CONSTITUTIONAL: no fevers or chills  EYES/ENT: No visual changes; No vertigo or throat pain  NECK: No mass  RESPIRATORY: Endorses SOB. No cough, wheezing, chills or hemoptysis.  CARDIOVASCULAR: Endorses Chest pain. Denies palpitations, passing out, dizziness, or leg swelling  GASTROINTESTINAL: Endorses Nausea. No abdominal or epigastric pain. No vomiting/melena  Genitourinary: No dysuria, frequency or hematuria  NEUROLOGICAL: No numbness or weakness  SKIN: No itching, burning, rashes or lesions      PHYSICAL EXAM:  T(C): 36.4 (11-10-24 @ 09:27), Max: 36.4 (11-10-24 @ 09:27)  HR: 143 (11-10-24 @ 13:43) (143 - 151)  BP: 99/86 (11-10-24 @ 13:43) (99/86 - 146/115)  RR: 17 (11-10-24 @ 13:43) (16 - 19)  SpO2: 98% (11-10-24 @ 13:43) (98% - 100%)    Vital Signs Last 24 Hrs  T(C): 36.4 (10 Nov 2024 09:27), Max: 36.4 (10 Nov 2024 09:27)  T(F): 97.6 (10 Nov 2024 09:27), Max: 97.6 (10 Nov 2024 09:27)  HR: 143 (10 Nov 2024 13:43) (143 - 151)  BP: 99/86 (10 Nov 2024 13:43) (99/86 - 146/115)  BP(mean): 93 (10 Nov 2024 13:43) (93 - 93)  RR: 17 (10 Nov 2024 13:43) (16 - 19)  SpO2: 98% (10 Nov 2024 13:43) (98% - 100%)  O2 Parameters below as of 10 Nov 2024 13:43  Patient On (Oxygen Delivery Method): room air          Appearance: Normal	  HEENT:   Normal oral mucosa	  Cardiovascular: Tachycardic Atrial Flutter on EKG, Elevated JVD, No murmurs, No edema  Respiratory: Lungs clear to auscultation	  Psychiatry: A & O x 3, Mood & affect appropriate  Gastrointestinal:  Soft, Non-tender, + BS	  Skin: No rashes, No ecchymoses, No cyanosis	  Neurologic: Non-focal  Extremities: Warm and well perfused. 2+ pulses bilaterally        LABS:	 	    CBC Full  -  ( 10 Nov 2024 10:12 )  WBC Count : 7.14 K/uL  Hemoglobin : 13.8 g/dL  Hematocrit : 41.9 %  Platelet Count - Automated : 228 K/uL  Mean Cell Volume : 82.3 fL  Mean Cell Hemoglobin : 27.1 pg  Mean Cell Hemoglobin Concentration : 32.9 g/dL  Auto Neutrophil # : 4.77 K/uL  Auto Lymphocyte # : 1.69 K/uL  Auto Monocyte # : 0.58 K/uL  Auto Eosinophil # : 0.05 K/uL  Auto Basophil # : 0.04 K/uL  Auto Neutrophil % : 66.8 %  Auto Lymphocyte % : 23.7 %  Auto Monocyte % : 8.1 %  Auto Eosinophil % : 0.7 %  Auto Basophil % : 0.6 %    11-10    141  |  107  |  19  ----------------------------<  123[H]  4.2   |  20[L]  |  1.10    Ca    8.9      10 Nov 2024 10:12  Phos  3.5     11-10  Mg     1.80     11-10    TPro  7.0  /  Alb  3.9  /  TBili  0.8  /  DBili  x   /  AST  66[H]  /  ALT  91[H]  /  AlkPhos  110  11-10      proBNP:  1831  Lipid Profile:   HgA1c:   TSH: Serum: 1.02 uIU/mL (11-10 @ 10:12)      CARDIAC MARKERS:  HsT: 13                TELEMETRY: A flutter     ECG: A flutter  	    PREVIOUS DIAGNOSTIC TESTING:

## 2024-11-10 NOTE — H&P ADULT - NSHPREVIEWOFSYSTEMS_GEN_ALL_CORE
REVIEW OF SYSTEMS:  CONSTITUTIONAL: No fevers or chills  EYES/ENT: No visual changes;  No vertigo or throat pain   NECK: No pain or stiffness  RESPIRATORY: No cough, wheezing + shortness of breath  CARDIOVASCULAR: +chest pain + palpitations  GASTROINTESTINAL: No abdominal or epigastric pain. No nausea, vomiting, No diarrhea or constipation. No melena or hematochezia.  GENITOURINARY: No dysuria or hematuria  NEUROLOGICAL: No numbness or weakness  SKIN: No itching, rashes REVIEW OF SYSTEMS:  CONSTITUTIONAL: No fevers or chills  EYES/ENT: No visual changes;  No current vertigo or throat pain   NECK: No pain or stiffness  RESPIRATORY: No cough, wheezing + shortness of breath  CARDIOVASCULAR: +chest pain + palpitations  GASTROINTESTINAL: No abdominal or epigastric pain. No nausea, vomiting, No diarrhea or constipation. No melena or hematochezia.  GENITOURINARY: No dysuria or hematuria  NEUROLOGICAL: No numbness or weakness  SKIN: No itching, rashes

## 2024-11-10 NOTE — ED ADULT TRIAGE NOTE - CHIEF COMPLAINT QUOTE
pt c/o 2 weeks worsening left side chest  pain 6/ 10 with increased sob/ dave. denies, n/v,  med hx. + palpations.

## 2024-11-10 NOTE — PATIENT PROFILE ADULT - FALL HARM RISK - HARM RISK INTERVENTIONS

## 2024-11-10 NOTE — H&P ADULT - PROBLEM SELECTOR PLAN 1
- Pt with atrial flutter in 2:1 block  - Likely been going on for months based on history  - EP following. Heparin drip ordered, along with lopressor 25mg TID with hold parameters  - Likely will need ablation vs GERALDINE with cardioversion    - Check TSH  - Monitor on tele. VS q4hrs. Replete lytes - Pt with atrial flutter in 2:1 block  - Likely been going on for months to years based on history  - EP following. Heparin drip ordered, along with lopressor 25mg TID with hold parameters  - Likely will need ablation vs GERALDINE with cardioversion    - Check TSH  - Monitor on tele. VS q4hrs. Replete lytes

## 2024-11-10 NOTE — ED PROVIDER NOTE - PHYSICAL EXAMINATION
GEN: NAD. A&Ox3. Non-toxic appearing.  HEENT: normocephalic, atraumatic, EOMI, PERRL, no scleral icterus, no conjuntival pallor, moist MM  CARDIAC: tachycardic, S1, S2, no murmur. Radial pulses  present and symmetric b/l  PULM: CTA B/L no wheeze, rhonchi, rales.   ABD: soft NT, ND, no rebound no guarding, no CVA tenderness.   MSK: Moving all extremities, no edema  NEURO: no focal neurological deficits, CN 2-12 grossly intact  SKIN: warm, dry, no rash.

## 2024-11-10 NOTE — H&P ADULT - NSHPPHYSICALEXAM_GEN_ALL_CORE
GENERAL: NAD, lying in bed comfortably  HEAD:  Atraumatic, Normocephalic  EYES: EOMI, PERRL, conjunctiva and sclera clear  ENT: Moist mucous membranes  NECK: Supple, No JVD  CHEST/LUNG: Clear to auscultation bilaterally; No rales, rhonchi, wheezing, or rubs. Unlabored respirations  HEART: tachycardic, irregular; No murmurs, rubs, or gallops  ABDOMEN: BSx4; Soft, nontender, nondistended  EXTREMITIES:  2+ Peripheral Pulses, No clubbing, cyanosis, or edema  NERVOUS SYSTEM:  A&Ox3, no focal deficits   SKIN: No rashes or lesions

## 2024-11-10 NOTE — ED PROVIDER NOTE - ATTENDING CONTRIBUTION TO CARE
This is a 70 y/o M with no PMHx p/w intermittent chest pain and SOB x 2 weeks, causing him to wake up in middle of night. Reports happened once in the past but no etiology identified. Denies any recent travel. Denies any fevers, chills, abdominal pain, nausea, vomiting, urinary complaints or lower extremity edema. Endorses current, nonradiating left sided chest pain that is reproducible to palpation. EKG: AFlutter @ 150bpm. Plan- Labs including electrolytes/TSH, CXR/UA to r/o infectious etiology, IVF hydration, Reassess This is a 70 y/o M with no PMHx p/w intermittent chest pain and SOB x 2 weeks, causing him to wake up in middle of night. Reports happened once or twice in the past but no etiology identified and negative workup at Cardiology. Recent travel from [place]. Denies any recent URIs, fevers, chills, abdominal pain, nausea, vomiting, urinary complaints or lower extremity edema. Endorses current, nonradiating left sided chest pain that is reproducible to palpation. EKG: AFlutter @ 150bpm. Plan- Labs including electrolytes/TSH/Erick, D-dimer, CXR/UA to r/o infectious etiology, Trial of IVF hydration, Consider Diltiazem if no improvement in HR,  Reassess This is a 68 y/o M with no PMHx p/w intermittent chest pain and SOB x 2 weeks, causing him to wake up in middle of night. Reports happened once or twice in the past but no etiology identified and negative workup at Cardiology. Recent travel from [place]. Denies any recent URIs, fevers, chills, abdominal pain, nausea, vomiting, urinary complaints or lower extremity edema. Endorses current, nonradiating left sided chest pain that is reproducible to palpation. EKG: AFlutter @ 150bpm. Plan- Labs including electrolytes/TSH/Erick, D-dimer given low risk (Age-adjusted cutoff of DDU should be 350ng/mL), CXR/UA to r/o infectious etiology, Trial of IVF hydration, Consider Diltiazem (if no elevated pro-BNP) vs Metoprolol (no wheezing currently) if no improvement in HR,  Reassess This is a 68 y/o M with no PMHx p/w intermittent chest pain and SOB x 2 weeks, causing him to wake up in middle of night. Reports happened once or twice in the past but no etiology identified and negative workup at Cardiology. Recent travel from [place]. Denies any recent URIs, fevers, chills, abdominal pain, nausea, vomiting, urinary complaints or lower extremity edema. Endorses current, nonradiating left sided chest pain that is reproducible to palpation. EKG: AFlutter @ 150bpm. Plan- Labs including electrolytes/TSH/Erick, D-dimer given low risk (Age-adjusted cutoff of DDU should be 350ng/mL), CXR/UA to r/o infectious etiology, Trial of IVF hydration, Consider Diltiazem (if no elevated pro-BNP) vs Metoprolol (no wheezing currently) if no improvement in HR,  Reassess    Reassessment: D-dimer elevated but under age-related cutoff. Given elevated pro-BNP, patient given trial of Metoprolol for rate control. After 2nd dose of Metoprolol 5mg IV, HR dropped to 105 with sinus rhythm and was given Metoprolol 25mg PO but HR increased shortly afterwards. HR transiently lowers with valsalva but increases immediately afterwards. Third dose of Metoprolol 5mg IV given with HR increasing back to 143s and /89. This is a 70 y/o M with no PMHx p/w intermittent chest pain and SOB x 2 weeks, causing him to wake up in middle of night. Reports happened once or twice in the past but no etiology identified and negative workup at Cardiology. Recent travel from [place]. Denies any recent URIs, fevers, chills, abdominal pain, nausea, vomiting, urinary complaints or lower extremity edema. Endorses current, nonradiating left sided chest pain that is reproducible to palpation. EKG: AFlutter @ 150bpm. Plan- Labs including electrolytes/TSH/Erick, D-dimer given low risk (Age-adjusted cutoff of DDU should be 350ng/mL), CXR/UA to r/o infectious etiology, Trial of IVF hydration, Consider Diltiazem (if no elevated pro-BNP) vs Metoprolol (no wheezing currently) if no improvement in HR,  Reassess    Reassessment: D-dimer elevated but under age-related cutoff. Given elevated pro-BNP, patient given trial of Metoprolol for rate control. After 2nd dose of Metoprolol 5mg IV, HR dropped to 105 with Flutter waves noted on monitor (around 11:44am on Tele monitor) and was given Metoprolol 25mg PO but HR increased shortly afterwards. HR transiently lowers with valsalva but increases immediately afterwards. Third dose of Metoprolol 5mg IV given with HR increasing back to 143s and /89. Cardiology consulted.

## 2024-11-10 NOTE — H&P ADULT - HISTORY OF PRESENT ILLNESS
69 M, no past medical history, presents with intermittent chest pain and SOB x 2 weeks. Patient states he flew home from [place] last night, he had chest pain and SOB on the airplane, which he states "slowly went away" when he got home. Patient states he had similar episodes in the past, he went to his doctor with an unremarkable workup. Patient states he had a "cold" two weeks ago. Endorses nausea. Denies fever, vomiting, abdominal pain, dizziness.      ED Course:  VS: Afebrile, BP normal and stable, -150's.  Found to be in atrial flutter. Seen by EP, who recommended IV diuresis and beta blocker. Pt also started on heparin drip. TTE showing ne biventricular failure, LVEF ~25%. Admitted to medicine for further management.  69 M, no known past medical history, presents with intermittent chest pain and SOB x 2 weeks. Patient states he flew home from [place] last night, he had chest pain and SOB on the airplane, which he states "slowly went away" when he got home. Patient states he had similar episodes in the past dating back to 2020. He went to a cardiologist but was unable to get a follow up appointment until late November. Patient states he had a "cold" two weeks ago. Denies fever, vomiting, abdominal pain, dizziness.     Pt recalls fainting episodes since childhood. He feels sudden dizziness and becomes faint for approximately 30 seconds. He also reports vertigo since childhood that has since improved. He had hyperlipidemia in 2022 that has since resolved with diet changes.    ED Course:  VS: Afebrile, BP normal and stable, -150's.  Found to be in atrial flutter. Seen by EP, who recommended IV diuresis and beta blocker. Pt also started on heparin drip. TTE showing ne biventricular failure, LVEF ~25%. Admitted to medicine for further management.  69 M, no known past medical history, presents with intermittent chest pain and SOB x 2 weeks. Patient states he flew home from [place] last night, he had chest pain and SOB on the airplane, which he states "slowly went away" when he got home. Pt reports recent hx of exertional SOB. Patient states he had similar episodes in the past dating back to 2020. He went to a cardiologist but was unable to get a follow up appointment until late November. Patient states he had a "cold" two weeks ago. Denies fever, vomiting, abdominal pain, dizziness.     Pt recalls fainting episodes since childhood. He feels sudden dizziness and becomes faint for approximately 30 seconds. He also reports vertigo since childhood that has since improved. He had hyperlipidemia in 2022 that has since resolved with diet changes.    ED Course:  VS: Afebrile, BP normal and stable, -150's.  Found to be in atrial flutter. Seen by EP, who recommended IV diuresis and beta blocker. Pt also started on heparin drip. TTE showing ne biventricular failure, LVEF ~25%. Admitted to medicine for further management.

## 2024-11-10 NOTE — H&P ADULT - PROBLEM SELECTOR PLAN 2
- TTE 11/10/14 with biventricular failure. LVEF 26% global hypokinesis  - Likely tachy mediated ISO above. Has severely dilated atria  - Clinically without signs or symptoms of volume overload, but large and non-collapsible IVC on TTE  - Intermittently having narrow pulse pressures. At high risk of decompensation/cardiogenic shock    - Continue with lasix IV 20mg BID. Monitor I/O, daily weight  - Check a1c and lipids  - HF consult 11/11. Introduce GDMT pending management of atrial flutter - TTE 11/10/14 with biventricular failure. LVEF 26% global hypokinesis  - Likely tachy mediated ISO above. Has severely dilated atria  - Clinically without signs or symptoms of volume overload, but large and non-collapsible IVC on TTE  - Intermittently having narrow pulse pressures. At high risk of decompensation/cardiogenic shock    - Continue with lasix IV 20mg BID. Monitor I/O, daily weight  - Check a1c and lipids  - HF consult 11/11. Introduce GDMT pending management of atrial flutter  - strict I/Os, 1,200mL fluid restriction - TTE 11/10/14 with biventricular failure. LVEF 26% global hypokinesis  - BNP 1831  - Likely tachy mediated ISO above. Has severely dilated atria  - Clinically without signs or symptoms of volume overload, but large and non-collapsible IVC on TTE  - Intermittently having narrow pulse pressures. At high risk of decompensation/cardiogenic shock    - Continue with lasix IV 20mg BID. Monitor I/O, daily weight  - Check a1c and lipids  - HF consult 11/11. Introduce GDMT pending management of atrial flutter  - strict I/Os, 1,200mL fluid restriction

## 2024-11-10 NOTE — ED PROVIDER NOTE - PROGRESS NOTE DETAILS
Elevated d-dimer, however age adjusted patient is below threshold.  Issac Sands PGY-3 Discussed with EP as unable to convert with 5 IV lopressor x3 and 25 metoprolol PO. Patient with sever LV dysfunction, trial lasix. No cardizem at this time given CHF. Will start on heparin drip and admit for telemetry and possible cardioversion.  Issac Sands PGY-3

## 2024-11-10 NOTE — H&P ADULT - ASSESSMENT
69 YOM with no reported medical history coming in with 2 weeks of intermittent chest pain and worsening shortness of breath, found to have atrial flutter with new onset biventricular failure.

## 2024-11-11 ENCOUNTER — TRANSCRIPTION ENCOUNTER (OUTPATIENT)
Age: 69
End: 2024-11-11

## 2024-11-11 LAB
ALBUMIN SERPL ELPH-MCNC: 3.5 G/DL — SIGNIFICANT CHANGE UP (ref 3.3–5)
ALP SERPL-CCNC: 104 U/L — SIGNIFICANT CHANGE UP (ref 40–120)
ALT FLD-CCNC: 93 U/L — HIGH (ref 4–41)
ANION GAP SERPL CALC-SCNC: 16 MMOL/L — HIGH (ref 7–14)
APTT BLD: 155.4 SEC — CRITICAL HIGH (ref 24.5–35.6)
APTT BLD: 78.6 SEC — HIGH (ref 24.5–35.6)
APTT BLD: 91.4 SEC — HIGH (ref 24.5–35.6)
AST SERPL-CCNC: 68 U/L — HIGH (ref 4–40)
BILIRUB SERPL-MCNC: 1 MG/DL — SIGNIFICANT CHANGE UP (ref 0.2–1.2)
BUN SERPL-MCNC: 25 MG/DL — HIGH (ref 7–23)
CALCIUM SERPL-MCNC: 8.6 MG/DL — SIGNIFICANT CHANGE UP (ref 8.4–10.5)
CHLORIDE SERPL-SCNC: 103 MMOL/L — SIGNIFICANT CHANGE UP (ref 98–107)
CO2 SERPL-SCNC: 20 MMOL/L — LOW (ref 22–31)
CREAT SERPL-MCNC: 1.31 MG/DL — HIGH (ref 0.5–1.3)
EGFR: 59 ML/MIN/1.73M2 — LOW
GLUCOSE SERPL-MCNC: 105 MG/DL — HIGH (ref 70–99)
HCT VFR BLD CALC: 39.7 % — SIGNIFICANT CHANGE UP (ref 39–50)
HGB BLD-MCNC: 13.2 G/DL — SIGNIFICANT CHANGE UP (ref 13–17)
MAGNESIUM SERPL-MCNC: 2 MG/DL — SIGNIFICANT CHANGE UP (ref 1.6–2.6)
MCHC RBC-ENTMCNC: 27.6 PG — SIGNIFICANT CHANGE UP (ref 27–34)
MCHC RBC-ENTMCNC: 33.2 G/DL — SIGNIFICANT CHANGE UP (ref 32–36)
MCV RBC AUTO: 82.9 FL — SIGNIFICANT CHANGE UP (ref 80–100)
NRBC # BLD: 0 /100 WBCS — SIGNIFICANT CHANGE UP (ref 0–0)
NRBC # FLD: 0 K/UL — SIGNIFICANT CHANGE UP (ref 0–0)
PHOSPHATE SERPL-MCNC: 4.4 MG/DL — SIGNIFICANT CHANGE UP (ref 2.5–4.5)
PLATELET # BLD AUTO: 194 K/UL — SIGNIFICANT CHANGE UP (ref 150–400)
POTASSIUM SERPL-MCNC: 4 MMOL/L — SIGNIFICANT CHANGE UP (ref 3.5–5.3)
POTASSIUM SERPL-SCNC: 4 MMOL/L — SIGNIFICANT CHANGE UP (ref 3.5–5.3)
PROT SERPL-MCNC: 6.1 G/DL — SIGNIFICANT CHANGE UP (ref 6–8.3)
RBC # BLD: 4.79 M/UL — SIGNIFICANT CHANGE UP (ref 4.2–5.8)
RBC # FLD: 13.7 % — SIGNIFICANT CHANGE UP (ref 10.3–14.5)
SODIUM SERPL-SCNC: 139 MMOL/L — SIGNIFICANT CHANGE UP (ref 135–145)
WBC # BLD: 8.07 K/UL — SIGNIFICANT CHANGE UP (ref 3.8–10.5)
WBC # FLD AUTO: 8.07 K/UL — SIGNIFICANT CHANGE UP (ref 3.8–10.5)

## 2024-11-11 PROCEDURE — 99233 SBSQ HOSP IP/OBS HIGH 50: CPT | Mod: GC

## 2024-11-11 PROCEDURE — 93010 ELECTROCARDIOGRAM REPORT: CPT

## 2024-11-11 PROCEDURE — 70450 CT HEAD/BRAIN W/O DYE: CPT | Mod: 26

## 2024-11-11 PROCEDURE — 99223 1ST HOSP IP/OBS HIGH 75: CPT

## 2024-11-11 PROCEDURE — 99231 SBSQ HOSP IP/OBS SF/LOW 25: CPT

## 2024-11-11 RX ORDER — DIGOXIN 250 MCG
250 TABLET ORAL EVERY 6 HOURS
Refills: 0 | Status: DISCONTINUED | OUTPATIENT
Start: 2024-11-11 | End: 2024-11-11

## 2024-11-11 RX ORDER — SPIRONOLACTONE 100 MG
25 TABLET ORAL DAILY
Refills: 0 | Status: DISCONTINUED | OUTPATIENT
Start: 2024-11-12 | End: 2024-11-14

## 2024-11-11 RX ORDER — FUROSEMIDE 40 MG
80 TABLET ORAL THREE TIMES A DAY
Refills: 0 | Status: DISCONTINUED | OUTPATIENT
Start: 2024-11-11 | End: 2024-11-11

## 2024-11-11 RX ORDER — METOPROLOL TARTRATE 50 MG
5 TABLET ORAL EVERY 6 HOURS
Refills: 0 | Status: DISCONTINUED | OUTPATIENT
Start: 2024-11-11 | End: 2024-11-11

## 2024-11-11 RX ORDER — SPIRONOLACTONE 100 MG
25 TABLET ORAL DAILY
Refills: 0 | Status: DISCONTINUED | OUTPATIENT
Start: 2024-11-11 | End: 2024-11-11

## 2024-11-11 RX ORDER — DIGOXIN 250 MCG
500 TABLET ORAL ONCE
Refills: 0 | Status: DISCONTINUED | OUTPATIENT
Start: 2024-11-11 | End: 2024-11-11

## 2024-11-11 RX ORDER — FUROSEMIDE 40 MG
40 TABLET ORAL EVERY 8 HOURS
Refills: 0 | Status: DISCONTINUED | OUTPATIENT
Start: 2024-11-11 | End: 2024-11-11

## 2024-11-11 RX ORDER — METOPROLOL TARTRATE 50 MG
25 TABLET ORAL EVERY 6 HOURS
Refills: 0 | Status: DISCONTINUED | OUTPATIENT
Start: 2024-11-11 | End: 2024-11-11

## 2024-11-11 RX ORDER — DIGOXIN 250 MCG
250 TABLET ORAL EVERY 6 HOURS
Refills: 0 | Status: COMPLETED | OUTPATIENT
Start: 2024-11-11 | End: 2024-11-12

## 2024-11-11 RX ORDER — APIXABAN 5 MG/1
5 TABLET, FILM COATED ORAL EVERY 12 HOURS
Refills: 0 | Status: DISCONTINUED | OUTPATIENT
Start: 2024-11-11 | End: 2024-11-14

## 2024-11-11 RX ORDER — HYDRALAZINE HYDROCHLORIDE 50 MG/1
10 TABLET, FILM COATED ORAL EVERY 8 HOURS
Refills: 0 | Status: DISCONTINUED | OUTPATIENT
Start: 2024-11-11 | End: 2024-11-13

## 2024-11-11 RX ORDER — FUROSEMIDE 40 MG
40 TABLET ORAL EVERY 8 HOURS
Refills: 0 | Status: DISCONTINUED | OUTPATIENT
Start: 2024-11-11 | End: 2024-11-12

## 2024-11-11 RX ADMIN — Medication 5 MILLIGRAM(S): at 09:35

## 2024-11-11 RX ADMIN — Medication 25 MILLIGRAM(S): at 05:26

## 2024-11-11 RX ADMIN — Medication 250 MICROGRAM(S): at 18:48

## 2024-11-11 RX ADMIN — Medication 500 MICROGRAM(S): at 16:00

## 2024-11-11 RX ADMIN — HEPARIN SODIUM 1300 UNIT(S)/HR: 10000 INJECTION INTRAVENOUS; SUBCUTANEOUS at 04:52

## 2024-11-11 RX ADMIN — HEPARIN SODIUM 1300 UNIT(S)/HR: 10000 INJECTION INTRAVENOUS; SUBCUTANEOUS at 11:31

## 2024-11-11 RX ADMIN — HYDRALAZINE HYDROCHLORIDE 10 MILLIGRAM(S): 50 TABLET, FILM COATED ORAL at 21:29

## 2024-11-11 RX ADMIN — Medication 25 MILLIGRAM(S): at 15:12

## 2024-11-11 RX ADMIN — Medication 80 MILLIGRAM(S): at 15:12

## 2024-11-11 RX ADMIN — HEPARIN SODIUM 0 UNIT(S)/HR: 10000 INJECTION INTRAVENOUS; SUBCUTANEOUS at 03:44

## 2024-11-11 RX ADMIN — HEPARIN SODIUM 1300 UNIT(S)/HR: 10000 INJECTION INTRAVENOUS; SUBCUTANEOUS at 15:10

## 2024-11-11 RX ADMIN — Medication 250 MICROGRAM(S): at 23:29

## 2024-11-11 RX ADMIN — APIXABAN 5 MILLIGRAM(S): 5 TABLET, FILM COATED ORAL at 18:48

## 2024-11-11 RX ADMIN — Medication 25 MILLIGRAM(S): at 13:38

## 2024-11-11 RX ADMIN — HEPARIN SODIUM 1300 UNIT(S)/HR: 10000 INJECTION INTRAVENOUS; SUBCUTANEOUS at 07:42

## 2024-11-11 RX ADMIN — Medication 40 MILLIGRAM(S): at 22:48

## 2024-11-11 RX ADMIN — Medication 20 MILLIGRAM(S): at 05:26

## 2024-11-11 NOTE — DISCHARGE NOTE PROVIDER - NSDCCPTREATMENT_GEN_ALL_CORE_FT
PRINCIPAL PROCEDURE  Procedure: Complete transthoracic echocardiography (TTE)  Findings and Treatment: 1. Left ventricular systolic function is severely decreased with an ejection fraction of 26 % by Wolff's method of disks. Global left ventricular hypokinesis.   2. Enlarged right ventricular cavity size, with normal wall thickness, and severely reduced right ventricular systolic function. Tricuspid annular plane systolic excursion (TAPSE) is 1.2 cm (normal >=1.7 cm).   3. Left atrium is severely dilated.   4. The right atrium is severely dilated.   5. Severe mitral regurgitation.   6. Suggest re evaluate after rate/rhythm control.   7. Estimated pulmonary artery systolic pressure is 38 mmHg.   8. No prior echocardiogram is available for comparison.   9. Pulmonary artery systolic pressure may be underestimated due to severe tricuspid regurgitation.  10. The inferior vena cava is dilated measuring 2.93 cm in diameter, (dilated >2.1cm) with abnormal inspiratory collapse (abnormal <50%) consistent with elevated right atrialpressure (~15, range 10-20mmHg).  11. There is normal LV mass and concentric remodeling.        SECONDARY PROCEDURE  Procedure: Three dimensional transesophageal echocardiography (GERALDINE)  Findings and Treatment: 1. Left ventricular systolic function is severely decreased. Global left ventricular hypokinesis.   2. Enlarged right ventricular cavity size and reduced right ventricular systolic function.   3. There is mitral valve thickening of the anterior and posterior leaflets. There is moderate mitral regurgitation. Vena contracta width ~ 0.4-0.5 cm. Estimated effective regurgitant orifice area (EROA) ~ 0.34 cm2 (by the PISA method).   4. The aortic valve appears trileaflet with normal systolic excursion. There is calcification of the aortic valve leaflets. There is no evidence of aortic regurgitation.   5. No atheroma in the visualized portions of the proximal ascending aorta. No atheroma in the visualized portions of the transverse aortic arch. No atheroma in the visualized portions of the descending aorta.   6. The left atrium is dilated. There is no evidence of left atrial or left atrial appendage thrombus.   7. Agitated saline injection was negative for intracardiac shunt.

## 2024-11-11 NOTE — PROGRESS NOTE ADULT - ATTENDING COMMENTS
69 year old male with no reported PMH presenting with chest pain and SOB, found to have new atrial flutter and HFrEF.    patient seen and examined at bedside. patient's spouse present. patient's SOB improved. still feels like his heart is racing. tele reviewed- has remained in Aflutter with rates in the 140s throughout the day.    #New onset atrial flutter  -monitor on tele- rates remain high in 140s  -EP following- plan for GERALDINE/DCCV tomorrow 11/12  -can switch from heparin drip to Eliquis this evening  -NPO at midnight  -metoprolol d/[place] and started on digoxin load    #HFrEF  -unclear chronicity, likely related to tachyarrhythmia  -TTE reviewed with EF of 26%, biventricular failure, severe MR, severely dilated LA  -heart failure team consulted  -Lasix increased to 80mg IV TID  -started on spironolactone    d/w HS 6 69 year old male with no reported PMH presenting with chest pain and SOB, found to have new atrial flutter and HFrEF.    patient seen and examined at bedside. patient's spouse present. patient's SOB improved. still feels like his heart is racing. tele reviewed- has remained in Aflutter with rates in the 140s throughout the day.    #New onset atrial flutter  -monitor on tele- rates remain high in 140s  -EP following- plan for GERALDINE/DCCV tomorrow 11/12  -can switch from heparin drip to Eliquis this evening  -NPO at midnight  -metoprolol d/[place] and started on digoxin load    #HFrEF  -unclear chronicity, likely related to tachyarrhythmia  -TTE reviewed with EF of 26%, biventricular failure, severe MR, severely dilated LA  -heart failure team consulted  -Lasix increased to 40mg IV TID  -started on spironolactone    d/w HS 6

## 2024-11-11 NOTE — DISCHARGE NOTE PROVIDER - CARE PROVIDER_API CALL
Sisi Hastings  Internal Medicine  [address] [place] [address]  Phone: [phone]  Fax: [phone]  Follow Up Time: 2 weeks

## 2024-11-11 NOTE — PROGRESS NOTE ADULT - PROBLEM SELECTOR PLAN 2
- TTE 11/10/14 with biventricular failure. LVEF 26% global hypokinesis  - BNP 1831  - Likely tachy mediated ISO above. Has severely dilated atria  - Clinically without signs or symptoms of volume overload, but large and non-collapsible IVC on TTE  - Intermittently having narrow pulse pressures. At high risk of decompensation/cardiogenic shock    - Continue with lasix IV 20mg BID. Monitor I/O, daily weight  - Check a1c (5.7) and lipids (unremarkable, low HDL)  - HF consult 11/11. Introduce GDMT pending management of atrial flutter  - strict I/Os, 1,200mL fluid restriction

## 2024-11-11 NOTE — CONSULT NOTE ADULT - PROBLEM SELECTOR RECOMMENDATION 9
Atrial flutter w/ RVR 140s.   Suggest d/c lopressor as he is in ADHF.   Suggest digoxin 0.5 mg po x 1, followed by 0.25 mg q 6 hrs later, and .25 mg q 6hrs later. Will likely add PO Digoxin  after.   EP to decided on GERALDINE DCCV.

## 2024-11-11 NOTE — CONSULT NOTE ADULT - ASSESSMENT
70 y/o male from [place], not previously followed by a PMD, with no known past medical history, presents with intermittent chest pain and SOB x 2 weeks. Overall he states he has noticed that he 'did not feel like superman" ever since he had COVID19 in 2020. He admits to fatigue and SOB ever since then. Lately he has experienced worsening SOB and decided to come to ED. TTE on 11/10/24 shows new diagnosis of HFrEF with biventricular dysfunction with LVEF of 15-20%, LVIDD 4.8cm, moderate to severe MR, VTI 7.   Labs show proBNP 1831, BUN/Cr 25/1.31, AST 68, ALT 93, A1C 5.7. CXR shows clear lungs. He was also noted to be in atrial flutter with rapid ventricular rate to 140s.  Overall new systolic heart failure, class II-currently he is moderately hypervolemic, hypertensive and is in atrial flutter w/ RVR 140s.

## 2024-11-11 NOTE — PROGRESS NOTE ADULT - SUBJECTIVE AND OBJECTIVE BOX
Patient is a 69y old  Male who presents with a chief complaint of A flutter with new HFrEF (11 Nov 2024 09:58)      Subjective: No new complaints. Denies HA, lightheadedness, dizziness, CP, palpitation, SOB, abdominal pain, and N/V.        MEDICATIONS  (STANDING):  furosemide   Injectable 20 milliGRAM(s) IV Push two times a day  heparin  Infusion.  Unit(s)/Hr (16 mL/Hr) IV Continuous <Continuous>  influenza  Vaccine (HIGH DOSE) 0.5 milliLiter(s) IntraMuscular once  metoprolol tartrate 25 milliGRAM(s) Oral three times a day    MEDICATIONS  (PRN):  heparin   Injectable 7000 Unit(s) IV Push every 6 hours PRN For aPTT less than 40  heparin   Injectable 3500 Unit(s) IV Push every 6 hours PRN For aPTT between 40 - 57  metoprolol tartrate Injectable 5 milliGRAM(s) IV Push every 6 hours PRN HR >120 and SBP >110      Vital Signs Last 24 Hrs  T(C): 36.7 (11 Nov 2024 09:30), Max: 36.8 (11 Nov 2024 05:00)  T(F): 98.1 (11 Nov 2024 09:30), Max: 98.3 (11 Nov 2024 05:00)  HR: 153 (11 Nov 2024 09:30) (143 - 153)  BP: 122/78 (11 Nov 2024 09:30) (99/86 - 135/90)  BP(mean): 93 (10 Nov 2024 13:43) (93 - 93)  RR: 18 (11 Nov 2024 09:30) (17 - 18)  SpO2: 100% (11 Nov 2024 09:30) (95% - 100%)    Parameters below as of 11 Nov 2024 09:30  Patient On (Oxygen Delivery Method): room air      I&O's Detail      Physical Exam:  GENERAL: Lying in bed, in NAD  HEART: +irregular regular, +tachycardia  PULMONARY: CTABL, normal respiratory effort.  No rales, wheezing, or rhonchi appreciated bilaterally. No use of accessory muscles  ABDOMEN: + Bowel sounds present, soft, NDNT  EXTREMITIES:  Warm, well -perfused, no pedal edema, distal pulses present    TELEMETRY: Atrial Flutter 2:1 @ 140s, asymptomatic                               13.2   8.07  )-----------( 194      ( 11 Nov 2024 02:35 )             39.7     PT/INR - ( 10 Nov 2024 02:25 )   PT: 13.4 sec;   INR: 1.16 ratio         PTT - ( 11 Nov 2024 10:33 )  PTT:91.4 sec  11-11    139  |  103  |  25[H]  ----------------------------<  105[H]  4.0   |  20[L]  |  1.31[H]    Ca    8.6      11 Nov 2024 02:35  Phos  4.4     11-11  Mg     2.00     11-11    TPro  6.1  /  Alb  3.5  /  TBili  1.0  /  DBili  x   /  AST  68[H]  /  ALT  93[H]  /  AlkPhos  104  11-11                   Patient is a 69y old  Male who presents with a chief complaint of A flutter with new HFrEF (11 Nov 2024 09:58)      Subjective: No new complaints. Denies HA, lightheadedness, dizziness, CP, palpitation, SOB, abdominal pain, and N/V.        MEDICATIONS  (STANDING):  furosemide   Injectable 20 milliGRAM(s) IV Push two times a day  heparin  Infusion.  Unit(s)/Hr (16 mL/Hr) IV Continuous <Continuous>  influenza  Vaccine (HIGH DOSE) 0.5 milliLiter(s) IntraMuscular once  metoprolol tartrate 25 milliGRAM(s) Oral three times a day    MEDICATIONS  (PRN):  heparin   Injectable 7000 Unit(s) IV Push every 6 hours PRN For aPTT less than 40  heparin   Injectable 3500 Unit(s) IV Push every 6 hours PRN For aPTT between 40 - 57  metoprolol tartrate Injectable 5 milliGRAM(s) IV Push every 6 hours PRN HR >120 and SBP >110      Vital Signs Last 24 Hrs  T(C): 36.7 (11 Nov 2024 09:30), Max: 36.8 (11 Nov 2024 05:00)  T(F): 98.1 (11 Nov 2024 09:30), Max: 98.3 (11 Nov 2024 05:00)  HR: 153 (11 Nov 2024 09:30) (143 - 153)  BP: 122/78 (11 Nov 2024 09:30) (99/86 - 135/90)  BP(mean): 93 (10 Nov 2024 13:43) (93 - 93)  RR: 18 (11 Nov 2024 09:30) (17 - 18)  SpO2: 100% (11 Nov 2024 09:30) (95% - 100%)    Parameters below as of 11 Nov 2024 09:30  Patient On (Oxygen Delivery Method): room air      I&O's Detail      Physical Exam:  GENERAL: Lying in bed, in NAD, +JVD  HEART: +irregular regular, +tachycardia  PULMONARY: CTABL, normal respiratory effort.  No rales, wheezing, or rhonchi appreciated bilaterally. No use of accessory muscles  ABDOMEN: + Bowel sounds present, soft, NDNT  EXTREMITIES:  Warm, well -perfused, no pedal edema, distal pulses present    TELEMETRY: Atrial Flutter 2:1 @ 140s, asymptomatic                               13.2   8.07  )-----------( 194      ( 11 Nov 2024 02:35 )             39.7     PT/INR - ( 10 Nov 2024 02:25 )   PT: 13.4 sec;   INR: 1.16 ratio         PTT - ( 11 Nov 2024 10:33 )  PTT:91.4 sec  11-11    139  |  103  |  25[H]  ----------------------------<  105[H]  4.0   |  20[L]  |  1.31[H]    Ca    8.6      11 Nov 2024 02:35  Phos  4.4     11-11  Mg     2.00     11-11    TPro  6.1  /  Alb  3.5  /  TBili  1.0  /  DBili  x   /  AST  68[H]  /  ALT  93[H]  /  AlkPhos  104  11-11

## 2024-11-11 NOTE — CONSULT NOTE ADULT - PROBLEM SELECTOR RECOMMENDATION 2
Moderately hypervolemic, JVP 14-16 cm.   Increase Lasix to 40 mg IV q 8 hrs, first dose now STAT.   Hold BB until he is out of ADHF.   Add hydralazine 10 mg po q 8 hrs, hold for SBP <90.  Will eventually add ARNI and SGLT2.   Keep K 4.0-5.0 and mag >2.0.   Daily standing weights and strict I/O.   Informed primary team of the above. Moderately hypervolemic, JVP 14-16 cm.   Increase Lasix to 40 mg IV q 8 hrs, first dose now STAT.   Hold BB until he is out of ADHF.   Add hydralazine 10 mg po q 8 hrs, hold for SBP <90.  Add spironolactone 25 mg daily. Hold for SBP <90.   Will eventually add ARNI and SGLT2.   Keep K 4.0-5.0 and mag >2.0.   Daily standing weights and strict I/O.   Informed primary team of the above.

## 2024-11-11 NOTE — DISCHARGE NOTE PROVIDER - NSDCFUADDAPPT_GEN_ALL_CORE_FT
APPTS ARE READY TO BE MADE: [ ] YES    Best Family or Patient Contact (if needed):    Additional Information about above appointments (if needed):    1:   2:   3:     Other comments or requests:    APPTS ARE READY TO BE MADE: [X] YES    Best Family or Patient Contact (if needed):    Additional Information about above appointments (if needed):    1: Pulmonology and Sleep medicine for evaluation of likely sleep apnea  2:   3:     Other comments or requests:    APPTS ARE READY TO BE MADE: [X] YES    Best Family or Patient Contact (if needed):    Additional Information about above appointments (if needed):    1: Pulmonology and Sleep medicine for evaluation of likely sleep apnea  2: Pt has cards, PCP and pulm appts scheduled   3:     Other comments or requests:    APPTS ARE READY TO BE MADE: [X] YES    Best Family or Patient Contact (if needed):    Additional Information about above appointments (if needed):    1: Pulmonology and Sleep medicine for evaluation of likely sleep apnea  2: Pt has cards, PCP and pulm appts scheduled   3:     Other comments or requests:     Prior to outreaching the patient, it was visible that the patient has secured a follow up appointment which was not scheduled by our team. Patient had an Virtual appointment 11/15/24 with NOLBERTO HERRON MD.    Prior to outreaching the patient, it was visible that the patient has secured a follow up appointment which was not scheduled by our team. Patient has an appointment with PCP ELLIS SOTO MD at 10:20am. [address] [facility]  [id number]

## 2024-11-11 NOTE — DISCHARGE NOTE PROVIDER - NSDCCPCAREPLAN_GEN_ALL_CORE_FT
PRINCIPAL DISCHARGE DIAGNOSIS  Diagnosis: Atrial flutter  Assessment and Plan of Treatment: You came to the hospital due to shortness of breath and chest discomfort. You were found to have an abnormal heart rhythm called atrial flutter. You were treated with medications to reduce the heart rate (digoxin, amiodarone, metoprolol). The cardiology team performed a procedure to stop your atrial flutter which was successful.  During your admission, we performed an ultrasound of your heart which showed that your heart is not pumping blood efficiently. You were started on medications to improve your heart health and prevent worsening of the hearts pumping ability (metoprolol, entresto, spironolactone, eliquis).  The cardiology team would like to perform a catheterization of the heart to look at the heart's blood vessels outside of the hospital. They would also like to do another ultrasound of the heart to monintor its function. Please follow up with a cardiologist within 2 weeks of discharge.  While in the hospital, your oxygen levels decreased while you were asleep. This is likely due to sleep apnea. Please follow up with a sleep medicine doctor for a sleep study.  Please return to the hospital if you develop chest pain or sudden difficulty breathing.     PRINCIPAL DISCHARGE DIAGNOSIS  Diagnosis: Atrial flutter  Assessment and Plan of Treatment: You came to the hospital due to shortness of breath and chest discomfort. You were found to have an abnormal heart rhythm called atrial flutter. You were treated with medications to reduce the heart rate (digoxin, amiodarone, metoprolol). The cardiology team performed a procedure to stop your atrial flutter which was successful.  During your admission, we performed an ultrasound of your heart which showed that your heart is not pumping blood efficiently. You were started on medications to improve your heart health and prevent worsening of the hearts pumping ability (metoprolol, entresto, emapagliflozin, spironolactone, eliquis).  The cardiology team would like to perform a catheterization of the heart to look at the heart's blood vessels outside of the hospital. They would also like to do another ultrasound of the heart to monintor its function. Please follow up with a cardiologist within 2 weeks of discharge.  While in the hospital, your oxygen levels decreased while you were asleep. This is likely due to sleep apnea. Please follow up with a sleep medicine doctor for a sleep study.  Please return to the hospital if you develop chest pain or sudden difficulty breathing.     PRINCIPAL DISCHARGE DIAGNOSIS  Diagnosis: Atrial flutter  Assessment and Plan of Treatment: You came to the hospital due to shortness of breath and chest discomfort. You were found to have an abnormal heart rhythm called atrial flutter. You were treated with medications to reduce the heart rate (digoxin, amiodarone, metoprolol). The cardiology team performed a procedure to stop your atrial flutter which was successful.  During your admission, we performed an ultrasound of your heart which showed that your heart is not pumping blood efficiently. You were started on medications to improve your heart health and prevent worsening of the hearts pumping ability (metoprolol, entresto, dapagliflozin, spironolactone, eliquis).  The cardiology team would like to perform a catheterization of the heart to look at the heart's blood vessels outside of the hospital. They would also like to do another ultrasound of the heart to monintor its function. You have an appointment with the cardiology team on 11/22.  While in the hospital, your oxygen levels decreased while you were asleep. This is likely due to sleep apnea. Please follow up with a sleep medicine doctor for a sleep study. You have a telehealth appointment with pulmonologist Dr Cho on 11/15 at 4pm to set up a home sleep study.  Please return to the hospital if you develop chest pain or sudden difficulty breathing.     PRINCIPAL DISCHARGE DIAGNOSIS  Diagnosis: Atrial flutter  Assessment and Plan of Treatment: You came to the hospital due to shortness of breath and chest discomfort. You were found to have an abnormal heart rhythm called atrial flutter. You were treated with medications to reduce the heart rate (digoxin, amiodarone, metoprolol). The cardiology team performed a procedure to stop your atrial flutter which was successful. Please take furosemide (lasix) if you experience shortness of breath, notice swelling in your legs or gain 3lbs within 24 hrs. You weight at discharge is 78.4kg (172.8lbs).  During your admission, we performed an ultrasound of your heart which showed that your heart is not pumping blood efficiently. You were started on medications to improve your heart health and prevent worsening of the hearts pumping ability (metoprolol, entresto, dapagliflozin, spironolactone, eliquis).  The cardiology team would like to perform a catheterization of the heart to look at the heart's blood vessels outside of the hospital. They would also like to do another ultrasound of the heart to monintor its function. You have an appointment with the cardiology team on 11/22.  While in the hospital, your oxygen levels decreased while you were asleep. This is likely due to sleep apnea. Please follow up with a sleep medicine doctor for a sleep study. You have a telehealth appointment with pulmonologist Dr Cho on 11/15 at 4pm to set up a home sleep study.  Please return to the hospital if you develop chest pain or sudden difficulty breathing.

## 2024-11-11 NOTE — PROGRESS NOTE ADULT - PROBLEM SELECTOR PLAN 4
- mild transaminitis on labs in ED  - Hep C negative on prior admission    Plan:  > trend LFTs  > fu lipid panel  > Liver US if LFTs do not improve - mild transaminitis on labs in ED  - Hep C negative on prior admission  - Likely iso hepatic congestion    Plan:  > trend LFTs  > fu lipid panel - WNL  > Liver US if LFTs do not improve

## 2024-11-11 NOTE — CONSULT NOTE ADULT - SUBJECTIVE AND OBJECTIVE BOX
Date of Admission:    CHIEF COMPLAINT:    HISTORY OF PRESENT ILLNESS:      Allergies    No Known Allergies    Intolerances    	    MEDICATIONS:  furosemide   Injectable 20 milliGRAM(s) IV Push two times a day  heparin   Injectable 3500 Unit(s) IV Push every 6 hours PRN  heparin   Injectable 7000 Unit(s) IV Push every 6 hours PRN  heparin  Infusion.  Unit(s)/Hr IV Continuous <Continuous>  metoprolol tartrate 25 milliGRAM(s) Oral three times a day  metoprolol tartrate Injectable 5 milliGRAM(s) IV Push every 6 hours PRN              influenza  Vaccine (HIGH DOSE) 0.5 milliLiter(s) IntraMuscular once      PAST MEDICAL & SURGICAL HISTORY:  No pertinent past medical history      No significant past surgical history          FAMILY HISTORY:  No pertinent family history in first degree relatives        SOCIAL HISTORY:    [ ] Non-smoker  [ ] Smoker  [ ] Alcohol      REVIEW OF SYSTEMS:  CONSTITUTIONAL: No fever, weight loss, or fatigue  EYES: No eye pain, visual disturbances, or discharge  ENMT:  No difficulty hearing, tinnitus, vertigo; No sinus or throat pain  NECK: No pain or stiffness  RESPIRATORY: No cough, wheezing, chills or hemoptysis; No Shortness of Breath  CARDIOVASCULAR: No chest pain, palpitations, passing out, dizziness, or leg swelling  GASTROINTESTINAL: No abdominal or epigastric pain. No nausea, vomiting, or hematemesis; No diarrhea or constipation. No melena or hematochezia.  GENITOURINARY: No dysuria, frequency, hematuria, or incontinence  NEUROLOGICAL: No headaches, memory loss, loss of strength, numbness, or tremors  SKIN: No itching, burning, rashes, or lesions   LYMPH Nodes: No enlarged glands  ENDOCRINE: No heat or cold intolerance; No hair loss  MUSCULOSKELETAL: No joint pain or swelling; No muscle, back, or extremity pain  PSYCHIATRIC: No depression, anxiety, mood swings, or difficulty sleeping  HEME/LYMPH: No easy bruising, or bleeding gums  ALLERY AND IMMUNOLOGIC: No hives or eczema	    [ ] All others negative	  [ ] Unable to obtain    PHYSICAL EXAM:  T(C): 36.7 (11-11-24 @ 09:30), Max: 36.8 (11-11-24 @ 05:00)  HR: 153 (11-11-24 @ 09:30) (143 - 153)  BP: 122/78 (11-11-24 @ 09:30) (99/86 - 146/115)  RR: 18 (11-11-24 @ 09:30) (16 - 19)  SpO2: 100% (11-11-24 @ 09:30) (95% - 100%)  Wt(kg): --  I&O's Summary      Appearance: Normal	  HEENT:   Normal oral mucosa, PERRL, EOMI	  Lymphatic: No lymphadenopathy  Cardiovascular: Normal S1 S2, No JVD, No murmurs, No edema  Respiratory: Lungs clear to auscultation	  Psychiatry: A & O x 3, Mood & affect appropriate  Gastrointestinal:  Soft, Non-tender, + BS	  Skin: No rashes, No ecchymoses, No cyanosis	  Neurologic: Non-focal  Extremities: Normal range of motion, No clubbing, cyanosis or edema  Vascular: Peripheral pulses palpable 2+ bilaterally        LABS:	 	    CBC Full  -  ( 11 Nov 2024 02:35 )  WBC Count : 8.07 K/uL  Hemoglobin : 13.2 g/dL  Hematocrit : 39.7 %  Platelet Count - Automated : 194 K/uL  Mean Cell Volume : 82.9 fL  Mean Cell Hemoglobin : 27.6 pg  Mean Cell Hemoglobin Concentration : 33.2 g/dL  Auto Neutrophil # : x  Auto Lymphocyte # : x  Auto Monocyte # : x  Auto Eosinophil # : x  Auto Basophil # : x  Auto Neutrophil % : x  Auto Lymphocyte % : x  Auto Monocyte % : x  Auto Eosinophil % : x  Auto Basophil % : x    11-11    139  |  103  |  25[H]  ----------------------------<  105[H]  4.0   |  20[L]  |  1.31[H]  11-10    139  |  106  |  22  ----------------------------<  148[H]  4.8   |  21[L]  |  1.16    Ca    8.6      11 Nov 2024 02:35  Ca    9.0      10 Nov 2024 16:56  Phos  4.4     11-11  Phos  3.8     11-10  Mg     2.00     11-11  Mg     1.80     11-10    TPro  6.1  /  Alb  3.5  /  TBili  1.0  /  DBili  x   /  AST  68[H]  /  ALT  93[H]  /  AlkPhos  104  11-11  TPro  6.7  /  Alb  3.6  /  TBili  1.0  /  DBili  x   /  AST  63[H]  /  ALT  89[H]  /  AlkPhos  103  11-10      proBNP:   Lipid Profile:   HgA1c:   TSH: Thyroid Stimulating Hormone, Serum: 0.72 uIU/mL (11-10 @ 16:56)  Thyroid Stimulating Hormone, Serum: 1.02 uIU/mL (11-10 @ 10:12)      < from: TTE W or WO Ultrasound Enhancing Agent (11.10.24 @ 15:08) >  CONCLUSIONS:      1. Left ventricular systolic function is severely decreased with an ejection fraction of 26 % by Wolff's method of disks. Global left ventricular hypokinesis.   2. Enlarged right ventricular cavity size, with normal wall thickness, and severely reduced right ventricular systolic function. Tricuspid annular plane systolic excursion (TAPSE) is 1.2 cm (normal >=1.7 cm).   3. Left atrium is severely dilated.   4. The right atrium is severely dilated.   5. Severe mitral regurgitation.   6. Suggest re evaluate after rate/rhythm control.   7. Estimated pulmonary artery systolic pressure is 38 mmHg.   8. No prior echocardiogram is available for comparison.   9. Pulmonary artery systolic pressure may be underestimated due to severe tricuspid regurgitation.  10. The inferior vena cava is dilated measuring 2.93 cm in diameter, (dilated >2.1cm) with abnormal inspiratory collapse (abnormal <50%) consistent with elevated right atrialpressure (~15, range 10-20mmHg).  11. There is normal LV mass and concentric remodeling.    ________________________________________________________________________________________  FINDINGS:     Left Ventricle:  Left ventricular systolic function isseverely decreased with a calculated ejection fraction of 26 % by the Wolff's biplane method of disks. There is global left ventricular hypokinesis. There is normal LV mass and concentric remodeling. Unable to assess left ventricular diastolic function due to insufficient data.     Right Ventricle:  The right ventricular cavity is enlarged in size, with normal wall thickness and right ventricular systolic function is severely reduced. Tricuspid annular plane systolic excursion (TAPSE) is 1.2 cm(normal >=1.7 cm).     Left Atrium:  The left atrium is severely dilated with an indexed volume of 53.25 ml/m².     Right Atrium:  The right atrium is severely dilated with an indexed volume of 59.06 ml/m² and an indexed area of 15.01 cm²/m².     Aortic Valve:  The aortic valve appears trileaflet. There is no evidence of aortic regurgitation.     Mitral Valve:  Structurally normal mitral valve with normal leaflet excursion. There is severe mitral regurgitation.     Tricuspid Valve:  Structurally normal tricuspid valve with normal leaflet excursion. There is moderate to severe tricuspid regurgitation. Estimated pulmonary artery systolic pressure is 38 mmHg.     Pulmonic Valve:  Structurally normal pulmonic valve with normal leaflet excursion. There is mild pulmonic regurgitation.     Pulmonary Artery:  Pulmonary artery systolic pressure may be underestimated due to severe tricuspid regurgitation.     Aorta:  The aortic root appears normal in size.     Pericardium:  There is a trace pericardial effusion noted adjacent to the right ventricle with no evidence of hemodynamic compromise (or echocardiographic evidence of cardiac tamponade).     Systemic Veins:  The inferior vena cava is dilated measuring 2.93 cm in diameter, (dilated >2.1cm)with abnormal inspiratory collapse (abnormal <50%) consistent with elevated right atrial pressure (~15, range 10-20mmHg).  ____________________________________________________________________  QUANTITATIVE DATA:  Left Ventricle Measurements: (Indexedto BSA)     IVSd (2D):   1.3 cm  LVPWd (2D):  1.2 cm  LVIDd (2D):  4.8 cm  LVIDs (2D):  4.2 cm  LV Mass:     237 g  114.8 g/m²  LV Vol d, MOD A2C: 111.0 ml 53.73 ml/m²  LV Vol d, MOD A4C: 92.7 ml  44.88 ml/m²  LV Vol d, MOD BP:  106.8 ml 51.69 ml/m²  LV Vol s, MOD A2C: 89.2 ml  43.18 ml/m²  LV Vol s, MOD A4C: 70.7 ml  34.23 ml/m²  LV Vol s, MOD BP:  79.4 ml  38.44 ml/m²  LVOT SV MOD BP:    27.4 ml  LV EF% MOD BP:     26 %    Aorta Measurements: (Normal range) (Indexed to BSA)     Ao Root d     3.20 cm (3.1 - 3.7 cm) 1.55 cm/m²  Ao ST Junct:  2.7 cm  Ao Asc d, 2D: 3.00  Ao Asc prox:  3.00 cm                1.45 cm/m²  Ao Arch:      2.2 cm         < end of copied text >    < from: Xray Chest 1 View- PORTABLE-Urgent (11.10.24 @ 10:20) >  FINDINGS:  The heart size is not accurately assessed on this projection.  The lungs are clear.  There is no pneumothorax or pleural effusion.    IMPRESSION:  Clear lungs.    < end of copied text >       Date of Admission:    CHIEF COMPLAINT:    HISTORY OF PRESENT ILLNESS:    68 y/o male from [place], not previously followed by a PMD, with no known past medical history, presents with intermittent chest pain and SOB x 2 weeks. Overall he states he has noticed that he 'did not feel like superman" ever since he had COVID19 in 2020. He admits to fatigue and SOB ever since then. Lately he has experienced worsening SOB and decided to come to ED. TTE on 11/10/24 shows new diagnosis of HFrEF with biventricular dysfunction with LVEF of 15-20%, LVIDD 4.8cm, moderate to severe MR, VTI 7.   Labs show proBNP 1831, BUN/Cr 25/1.31, AST 68, ALT 93, A1C 5.7. CXR shows clear lungs. He was also noted to be in atrial flutter with rapid ventricular rate to 140s. He was found to be in ADHF, and started on Lasix 20 IV BID and lopressor for flutter w RVR. HF service consulted on 11/11.  He states he still feels some difficulty breathing and is not back to his baseline. Denies CP, palpitations, dizziness. Denies history of CAD/MI. Admits to never having a PMD. He works as a peraza. Denies smoking or ETOH use. His radhaer Viji was present in room and help articulate history of events.     Allergies    No Known Allergies    Intolerances    	    MEDICATIONS:  furosemide   Injectable 20 milliGRAM(s) IV Push two times a day  heparin   Injectable 3500 Unit(s) IV Push every 6 hours PRN  heparin   Injectable 7000 Unit(s) IV Push every 6 hours PRN  heparin  Infusion.  Unit(s)/Hr IV Continuous <Continuous>  metoprolol tartrate 25 milliGRAM(s) Oral three times a day  metoprolol tartrate Injectable 5 milliGRAM(s) IV Push every 6 hours PRN              influenza  Vaccine (HIGH DOSE) 0.5 milliLiter(s) IntraMuscular once      PAST MEDICAL & SURGICAL HISTORY:  No pertinent past medical history      No significant past surgical history          FAMILY HISTORY:  No pertinent family history in first degree relatives        SOCIAL HISTORY:    [x ] Non-smoker    [x ] no Alcohol      REVIEW OF SYSTEMS:  CONSTITUTIONAL: No fever, weight loss, or fatigue  EYES: No eye pain, visual disturbances, or discharge  ENMT:  No difficulty hearing, tinnitus, vertigo; No sinus or throat pain  NECK: No pain or stiffness  RESPIRATORY: No cough, wheezing, chills or hemoptysis; admits to Shortness of Breath  CARDIOVASCULAR: No chest pain, palpitations, passing out, dizziness, or leg swelling  GASTROINTESTINAL: No abdominal or epigastric pain. No nausea, vomiting, or hematemesis; No diarrhea or constipation. No melena or hematochezia.  GENITOURINARY: No dysuria, frequency, hematuria, or incontinence  NEUROLOGICAL: No headaches, memory loss, loss of strength, numbness, or tremors  SKIN: No itching, burning, rashes, or lesions   LYMPH Nodes: No enlarged glands  ENDOCRINE: No heat or cold intolerance; No hair loss  MUSCULOSKELETAL: No joint pain or swelling; No muscle, back, or extremity pain  PSYCHIATRIC: No depression, anxiety, mood swings, or difficulty sleeping  HEME/LYMPH: No easy bruising, or bleeding gums  ALLERY AND IMMUNOLOGIC: No hives or eczema	    [ ] All others negative	  [ ] Unable to obtain    PHYSICAL EXAM:  T(C): 36.7 (11-11-24 @ 09:30), Max: 36.8 (11-11-24 @ 05:00)  HR: 153 (11-11-24 @ 09:30) (143 - 153)  BP: 122/78 (11-11-24 @ 09:30) (99/86 - 146/115)  RR: 18 (11-11-24 @ 09:30) (16 - 19)  SpO2: 100% (11-11-24 @ 09:30) (95% - 100%)  Wt(kg): --  I&O's Summary      Appearance: Normal. Appears younger than stated age.  HEENT:   Normal oral mucosa, PERRL, EOMI	  Lymphatic: No lymphadenopathy  Cardiovascular: Normal S1 S2, JVP 14-16cm, No murmurs, No edema and is warm b/l.   Respiratory: CTA b/l  Psychiatry: A & O x 3, Mood & affect appropriate  Gastrointestinal:  Soft, Non-tender, + BS	  Skin: No rashes, No ecchymoses, No cyanosis	  Neurologic: Non-focal  Extremities: Normal range of motion, No clubbing, cyanosis or edema  Vascular: Peripheral pulses palpable 2+ bilaterally        LABS:	 	    CBC Full  -  ( 11 Nov 2024 02:35 )  WBC Count : 8.07 K/uL  Hemoglobin : 13.2 g/dL  Hematocrit : 39.7 %  Platelet Count - Automated : 194 K/uL  Mean Cell Volume : 82.9 fL  Mean Cell Hemoglobin : 27.6 pg  Mean Cell Hemoglobin Concentration : 33.2 g/dL  Auto Neutrophil # : x  Auto Lymphocyte # : x  Auto Monocyte # : x  Auto Eosinophil # : x  Auto Basophil # : x  Auto Neutrophil % : x  Auto Lymphocyte % : x  Auto Monocyte % : x  Auto Eosinophil % : x  Auto Basophil % : x    11-11    139  |  103  |  25[H]  ----------------------------<  105[H]  4.0   |  20[L]  |  1.31[H]  11-10    139  |  106  |  22  ----------------------------<  148[H]  4.8   |  21[L]  |  1.16    Ca    8.6      11 Nov 2024 02:35  Ca    9.0      10 Nov 2024 16:56  Phos  4.4     11-11  Phos  3.8     11-10  Mg     2.00     11-11  Mg     1.80     11-10    TPro  6.1  /  Alb  3.5  /  TBili  1.0  /  DBili  x   /  AST  68[H]  /  ALT  93[H]  /  AlkPhos  104  11-11  TPro  6.7  /  Alb  3.6  /  TBili  1.0  /  DBili  x   /  AST  63[H]  /  ALT  89[H]  /  AlkPhos  103  11-10      proBNP:   Lipid Profile:   HgA1c:   TSH: Thyroid Stimulating Hormone, Serum: 0.72 uIU/mL (11-10 @ 16:56)  Thyroid Stimulating Hormone, Serum: 1.02 uIU/mL (11-10 @ 10:12)      < from: TTE W or WO Ultrasound Enhancing Agent (11.10.24 @ 15:08) >  CONCLUSIONS:      1. Left ventricular systolic function is severely decreased with an ejection fraction of 26 % by Wolff's method of disks. Global left ventricular hypokinesis.   2. Enlarged right ventricular cavity size, with normal wall thickness, and severely reduced right ventricular systolic function. Tricuspid annular plane systolic excursion (TAPSE) is 1.2 cm (normal >=1.7 cm).   3. Left atrium is severely dilated.   4. The right atrium is severely dilated.   5. Severe mitral regurgitation.   6. Suggest re evaluate after rate/rhythm control.   7. Estimated pulmonary artery systolic pressure is 38 mmHg.   8. No prior echocardiogram is available for comparison.   9. Pulmonary artery systolic pressure may be underestimated due to severe tricuspid regurgitation.  10. The inferior vena cava is dilated measuring 2.93 cm in diameter, (dilated >2.1cm) with abnormal inspiratory collapse (abnormal <50%) consistent with elevated right atrialpressure (~15, range 10-20mmHg).  11. There is normal LV mass and concentric remodeling.    ________________________________________________________________________________________  FINDINGS:     Left Ventricle:  Left ventricular systolic function isseverely decreased with a calculated ejection fraction of 26 % by the Wolff's biplane method of disks. There is global left ventricular hypokinesis. There is normal LV mass and concentric remodeling. Unable to assess left ventricular diastolic function due to insufficient data.     Right Ventricle:  The right ventricular cavity is enlarged in size, with normal wall thickness and right ventricular systolic function is severely reduced. Tricuspid annular plane systolic excursion (TAPSE) is 1.2 cm(normal >=1.7 cm).     Left Atrium:  The left atrium is severely dilated with an indexed volume of 53.25 ml/m².     Right Atrium:  The right atrium is severely dilated with an indexed volume of 59.06 ml/m² and an indexed area of 15.01 cm²/m².     Aortic Valve:  The aortic valve appears trileaflet. There is no evidence of aortic regurgitation.     Mitral Valve:  Structurally normal mitral valve with normal leaflet excursion. There is severe mitral regurgitation.     Tricuspid Valve:  Structurally normal tricuspid valve with normal leaflet excursion. There is moderate to severe tricuspid regurgitation. Estimated pulmonary artery systolic pressure is 38 mmHg.     Pulmonic Valve:  Structurally normal pulmonic valve with normal leaflet excursion. There is mild pulmonic regurgitation.     Pulmonary Artery:  Pulmonary artery systolic pressure may be underestimated due to severe tricuspid regurgitation.     Aorta:  The aortic root appears normal in size.     Pericardium:  There is a trace pericardial effusion noted adjacent to the right ventricle with no evidence of hemodynamic compromise (or echocardiographic evidence of cardiac tamponade).     Systemic Veins:  The inferior vena cava is dilated measuring 2.93 cm in diameter, (dilated >2.1cm)with abnormal inspiratory collapse (abnormal <50%) consistent with elevated right atrial pressure (~15, range 10-20mmHg).  ____________________________________________________________________  QUANTITATIVE DATA:  Left Ventricle Measurements: (Indexedto BSA)     IVSd (2D):   1.3 cm  LVPWd (2D):  1.2 cm  LVIDd (2D):  4.8 cm  LVIDs (2D):  4.2 cm  LV Mass:     237 g  114.8 g/m²  LV Vol d, MOD A2C: 111.0 ml 53.73 ml/m²  LV Vol d, MOD A4C: 92.7 ml  44.88 ml/m²  LV Vol d, MOD BP:  106.8 ml 51.69 ml/m²  LV Vol s, MOD A2C: 89.2 ml  43.18 ml/m²  LV Vol s, MOD A4C: 70.7 ml  34.23 ml/m²  LV Vol s, MOD BP:  79.4 ml  38.44 ml/m²  LVOT SV MOD BP:    27.4 ml  LV EF% MOD BP:     26 %    Aorta Measurements: (Normal range) (Indexed to BSA)     Ao Root d     3.20 cm (3.1 - 3.7 cm) 1.55 cm/m²  Ao ST Junct:  2.7 cm  Ao Asc d, 2D: 3.00  Ao Asc prox:  3.00 cm                1.45 cm/m²  Ao Arch:      2.2 cm         < end of copied text >    < from: Xray Chest 1 View- PORTABLE-Urgent (11.10.24 @ 10:20) >  FINDINGS:  The heart size is not accurately assessed on this projection.  The lungs are clear.  There is no pneumothorax or pleural effusion.    IMPRESSION:  Clear lungs.    < end of copied text >

## 2024-11-11 NOTE — DISCHARGE NOTE PROVIDER - HOSPITAL COURSE
HPI:  69 M, no known past medical history, presents with intermittent chest pain and SOB x 2 weeks. Patient states he flew home from [place] last night, he had chest pain and SOB on the airplane, which he states "slowly went away" when he got home. Pt reports recent hx of exertional SOB. Patient states he had similar episodes in the past dating back to 2020. He went to a cardiologist but was unable to get a follow up appointment until late November. Patient states he had a "cold" two weeks ago. Denies fever, vomiting, abdominal pain, dizziness.     Pt recalls fainting episodes since childhood. He feels sudden dizziness and becomes faint for approximately 30 seconds. He also reports vertigo since childhood that has since improved. He had hyperlipidemia in 2022 that has since resolved with diet changes.    ED Course:  VS: Afebrile, BP normal and stable, -150's.  Found to be in atrial flutter. Seen by EP, who recommended IV diuresis and beta blocker. Pt also started on heparin drip. TTE showing new biventricular failure, LVEF ~25%. Admitted to medicine for further management.  (10 Nov 2024 16:41)    During admission,    HPI:  69 M, no known past medical history, presents with intermittent chest pain and SOB x 2 weeks. Patient states he flew home from [place] last night, he had chest pain and SOB on the airplane, which he states "slowly went away" when he got home. Pt reports recent hx of exertional SOB. Patient states he had similar episodes in the past dating back to 2020. He went to a cardiologist but was unable to get a follow up appointment until late November. Patient states he had a "cold" two weeks ago. Denies fever, vomiting, abdominal pain, dizziness.     Pt recalls fainting episodes since childhood. He feels sudden dizziness and becomes faint for approximately 30 seconds. He also reports vertigo since childhood that has since improved. He had hyperlipidemia in 2022 that has since resolved with diet changes.    ED Course:  VS: Afebrile, BP normal and stable, -150's.  Found to be in atrial flutter. Seen by EP, who recommended IV diuresis and beta blocker. Pt also started on heparin drip. TTE showing new biventricular failure, LVEF ~25%. Admitted to medicine for further management.  (10 Nov 2024 16:41)    During admission, pt was seen by EP cardiology who performed GERALDINE w DCCV ***. Pt seen by HF cardiology who recommended digoxin, GDMT, and diuresis. Heart catheterization was performed which showed ***. Cardiology recommends follow up a TTE outpatient.    Medication Changes:  Metoprolol  Farxiga  Spironolactone   Entresto     Follow up:  Cardiology for management of HF and aflutter  Sleep medicine for likely Sleep Apnea   HPI:  69 M, no known past medical history, presents with intermittent chest pain and SOB x 2 weeks. Patient states he flew home from [place] last night, he had chest pain and SOB on the airplane, which he states "slowly went away" when he got home. Pt reports recent hx of exertional SOB. Patient states he had similar episodes in the past dating back to 2020. He went to a cardiologist but was unable to get a follow up appointment until late November. Patient states he had a "cold" two weeks ago. Denies fever, vomiting, abdominal pain, dizziness.     Pt recalls fainting episodes since childhood. He feels sudden dizziness and becomes faint for approximately 30 seconds. He also reports vertigo since childhood that has since improved. He had hyperlipidemia in 2022 that has since resolved with diet changes.    ED Course:  VS: Afebrile, BP normal and stable, -150's.  Found to be in atrial flutter. Seen by EP, who recommended IV diuresis and beta blocker. Pt also started on heparin drip. TTE showing new biventricular failure, LVEF ~25%. Admitted to medicine for further management.  (10 Nov 2024 16:41)    During admission, pt was seen by EP cardiology who performed GERALDINE w DCCV with return to sinus rhythm. Pt seen by HF cardiology who recommended digoxin load, starting amiodarone GDMT, and diuresis. Heart catheterization recommended outpatient. Cardiology recommends follow up a TTE outpatient as well.    Medication Changes:  Metoprolol  Hydralazine  Amiodarone  Spironolactone   Entresto     Follow up:  Cardiology for management of HF and aflutter  Sleep medicine for likely Sleep Apnea   HPI:  69 M, no known past medical history, presents with intermittent chest pain and SOB x 2 weeks. Patient states he flew home from [place] last night, he had chest pain and SOB on the airplane, which he states "slowly went away" when he got home. Pt reports recent hx of exertional SOB. Patient states he had similar episodes in the past dating back to 2020. He went to a cardiologist but was unable to get a follow up appointment until late November. Patient states he had a "cold" two weeks ago. Denies fever, vomiting, abdominal pain, dizziness.     Pt recalls fainting episodes since childhood. He feels sudden dizziness and becomes faint for approximately 30 seconds. He also reports vertigo since childhood that has since improved. He had hyperlipidemia in 2022 that has since resolved with diet changes.    ED Course:  VS: Afebrile, BP normal and stable, -150's.  Found to be in atrial flutter. Seen by EP, who recommended IV diuresis and beta blocker. Pt also started on heparin drip. TTE showing new biventricular failure, LVEF ~25%. Admitted to medicine for further management.  (10 Nov 2024 16:41)    During admission, pt was seen by EP cardiology who performed GERALDINE w DCCV with return to sinus rhythm. Pt seen by HF cardiology who recommended digoxin load, starting amiodarone GDMT, and diuresis. Heart catheterization recommended outpatient. Cardiology recommends follow up a TTE outpatient as well.    Pt was also evaluated for sleep apnea with an overnight pulse oximetry testing which showed***    Medication Changes:  Metoprolol  Hydralazine  Amiodarone  Spironolactone   Entresto     Follow up:  Cardiology for management of HF and aflutter  Sleep medicine for likely Sleep Apnea   HPI:  69 M, no known past medical history, presents with intermittent chest pain and SOB x 2 weeks. Patient states he flew home from [place] last night, he had chest pain and SOB on the airplane, which he states "slowly went away" when he got home. Pt reports recent hx of exertional SOB. Patient states he had similar episodes in the past dating back to 2020. He went to a cardiologist but was unable to get a follow up appointment until late November. Patient states he had a "cold" two weeks ago. Denies fever, vomiting, abdominal pain, dizziness.     Pt recalls fainting episodes since childhood. He feels sudden dizziness and becomes faint for approximately 30 seconds. He also reports vertigo since childhood that has since improved. He had hyperlipidemia in 2022 that has since resolved with diet changes.    ED Course:  VS: Afebrile, BP normal and stable, -150's.  Found to be in atrial flutter. Seen by EP, who recommended IV diuresis and beta blocker. Pt also started on heparin drip. TTE showing new biventricular failure, LVEF ~25%. Admitted to medicine for further management.  (10 Nov 2024 16:41)    During admission, pt was seen by EP cardiology who performed GERALDINE w DCCV with return to sinus rhythm. Pt seen by HF cardiology who recommended digoxin load, starting amiodarone GDMT, and diuresis. Heart catheterization recommended outpatient. Cardiology recommends follow up a TTE outpatient as well.    Pt was also evaluated for sleep apnea with an overnight pulse oximetry testing which showed***    Medication Changes:  Metoprolol  Hydralazine  Amiodarone  Spironolactone   Entresto   Empagliflozin    Follow up:  Cardiology for management of HF and aflutter  Sleep medicine for likely Sleep Apnea   HPI:  69 M, no known past medical history, presents with intermittent chest pain and SOB x 2 weeks. Patient states he flew home from [place] last night, he had chest pain and SOB on the airplane, which he states "slowly went away" when he got home. Pt reports recent hx of exertional SOB. Patient states he had similar episodes in the past dating back to 2020. He went to a cardiologist but was unable to get a follow up appointment until late November. Patient states he had a "cold" two weeks ago. Denies fever, vomiting, abdominal pain, dizziness.     Pt recalls fainting episodes since childhood. He feels sudden dizziness and becomes faint for approximately 30 seconds. He also reports vertigo since childhood that has since improved. He had hyperlipidemia in 2022 that has since resolved with diet changes.    ED Course:  VS: Afebrile, BP normal and stable, -150's.  Found to be in atrial flutter. Seen by EP, who recommended IV diuresis and beta blocker. Pt also started on heparin drip. TTE showing new biventricular failure, LVEF ~25%. Admitted to medicine for further management.  (10 Nov 2024 16:41)    During admission, pt was seen by EP cardiology who performed GERALDINE w DCCV with return to sinus rhythm. Pt seen by HF cardiology who recommended digoxin load, starting amiodarone GDMT, and diuresis. Heart catheterization recommended outpatient. Cardiology recommends follow up a TTE outpatient as well.    We attempted to evaluate for sleep apnea with an overnight pulse oximetry testing which was not performed by RT. Pt scheduled for pulm appt day after dc.    Medication Changes:  Metoprolol  Hydralazine  Amiodarone  Spironolactone   Entresto   Empagliflozin    Follow up:  Cardiology for management of HF and aflutter  Sleep medicine for likely Sleep Apnea  PCP for initial visit and med management   HPI:  69 M, no known past medical history, presents with intermittent chest pain and SOB x 2 weeks. Patient states he flew home from [place] last night, he had chest pain and SOB on the airplane, which he states "slowly went away" when he got home. Pt reports recent hx of exertional SOB. Patient states he had similar episodes in the past dating back to 2020. He went to a cardiologist but was unable to get a follow up appointment until late November. Patient states he had a "cold" two weeks ago. Denies fever, vomiting, abdominal pain, dizziness.     Pt recalls fainting episodes since childhood. He feels sudden dizziness and becomes faint for approximately 30 seconds. He also reports vertigo since childhood that has since improved. He had hyperlipidemia in 2022 that has since resolved with diet changes.    ED Course:  VS: Afebrile, BP normal and stable, -150's.  Found to be in atrial flutter. Seen by EP, who recommended IV diuresis and beta blocker. Pt also started on heparin drip. TTE showing new biventricular failure, LVEF ~25%. Admitted to medicine for further management.  (10 Nov 2024 16:41)    During admission, pt was seen by EP cardiology who performed GERALDINE w DCCV with return to sinus rhythm. Pt seen by HF cardiology who recommended digoxin load, starting amiodarone GDMT, and diuresis. Heart catheterization recommended outpatient. Cardiology recommends follow up a TTE outpatient as well.    We attempted to evaluate for sleep apnea with an overnight pulse oximetry testing which was not performed by RT. Pt scheduled for pulm appt day after dc.    Patients standing weight on discharge is 78.4kg.    Medication Changes:  Metoprolol  Hydralazine  Amiodarone  Spironolactone   Entresto   Empagliflozin    Follow up:  Cardiology for management of HF and aflutter  Sleep medicine for likely Sleep Apnea  PCP for initial visit and med management

## 2024-11-11 NOTE — PROGRESS NOTE ADULT - ASSESSMENT
69 M, no past medical history, presents with intermittent chest pain and SOB x 2 weeks. Patient states he flew home from [place] last night, he had chest pain and SOB on the airplane, which he states "slowly went away" when he got home. Patient states he had similar episodes in the past, he went to his doctor with an unremarkable workup. Patient states he had a "cold" two weeks ago. Endorses  nausea. Denies fever, vomiting, abdominal pain, dizziness, loss of consciousness.    TTE 11/10/24: LVEF 26% severely reduced Left/Right ventricular function.  LA severely dilated. Severe MR. Severe TR. IVC dilated     1) New Onset Atrial Flutter  2) Biventricular Failure tachycardia related      Recommendations:  - Continuous telemetric monitoring, past 24hrs reviewed: Atrial Flutter 2:1 @ 140s, asymptomatic   - TTE reviewed recommend repeat once arrythmia controlled, can be done as outpatient  - Monitor electrolytes and replete K to 4 and Mg to 2  - daily weight, monitor strict Intake and Output  - TSH: 0.72  - Continue care per primary team, GDMT as per cardiology team  - c/w Lasix 20mg IV BID, appears euvolemic, breathing improved currently on room air saturating >95%  - recommend switching Heparin drip to Eliquis  - recommend NPO after midnight for planned GERALDINE w/ DCCV for tomorrow 11/12/24        EP service [id number] 69 M, no past medical history, presents with intermittent chest pain and SOB x 2 weeks. Patient states he flew home from [place] last night, he had chest pain and SOB on the airplane, which he states "slowly went away" when he got home. Patient states he had similar episodes in the past, he went to his doctor with an unremarkable workup. Patient states he had a "cold" two weeks ago. Endorses  nausea. Denies fever, vomiting, abdominal pain, dizziness, loss of consciousness.    TTE 11/10/24: LVEF 26% severely reduced Left/Right ventricular function.  LA severely dilated. Severe MR. Severe TR. IVC dilated     1) New Onset Atrial Flutter  2) Biventricular Failure tachycardia related      Recommendations:  - Continuous telemetric monitoring, past 24hrs reviewed: Atrial Flutter 2:1 @ 140s, asymptomatic   - TTE reviewed recommend repeat once arrythmia controlled, can be done as outpatient  - Monitor electrolytes and replete K to 4 and Mg to 2  - daily weight, monitor strict Intake and Output  - TSH: 0.72  - Continue care per primary team, GDMT as per cardiology team  - c/w Lasix 20mg IV BID, appears euvolemic, breathing improved currently on room air saturating >95%  - recommend increasing Metoprolol 25mg TID to q6h, agree w/ PRN metoprolol IVP   - recommend switching Heparin drip to Eliquis  - recommend NPO after midnight for planned GERALDINE w/ DCCV for tomorrow 11/12/24        EP service [id number] 69 M, no past medical history, presents with intermittent chest pain and SOB x 2 weeks. Patient states he flew home from [place] last night, he had chest pain and SOB on the airplane, which he states "slowly went away" when he got home. Patient states he had similar episodes in the past, he went to his doctor with an unremarkable workup. Patient states he had a "cold" two weeks ago. Endorses  nausea. Denies fever, vomiting, abdominal pain, dizziness, loss of consciousness.    TTE 11/10/24: LVEF 26% severely reduced Left/Right ventricular function.  LA severely dilated. Severe MR. Severe TR. IVC dilated     1) New Onset Atrial Flutter  2) Biventricular Failure tachycardia related      Recommendations:  - Continuous telemetric monitoring, past 24hrs reviewed: Atrial Flutter 2:1 @ 140s, asymptomatic   - TTE reviewed recommend repeat once arrythmia controlled, can be done as outpatient  - Monitor electrolytes and replete K to 4 and Mg to 2  - daily weight, monitor strict Intake and Output  - TSH: 0.72  - Continue care per primary team, GDMT as per cardiology team  - c/w Lasix 20mg IV BID, breathing has improved currently on room air saturating >95%, +JVD  - recommend increasing Metoprolol 25mg TID to q6h, agree w/ PRN metoprolol IVP   - recommend switching Heparin drip to Eliquis  - recommend NPO after midnight for planned GERALDINE w/ DCCV for tomorrow 11/12/24        EP service [id number] 69 M, no past medical history, presents with intermittent chest pain and SOB x 2 weeks. Patient states he flew home from [place] last night, he had chest pain and SOB on the airplane, which he states "slowly went away" when he got home. Patient states he had similar episodes in the past, he went to his doctor with an unremarkable workup. Patient states he had a "cold" two weeks ago. Endorses  nausea. Denies fever, vomiting, abdominal pain, dizziness, loss of consciousness.    TTE 11/10/24: LVEF 26% severely reduced Left/Right ventricular function.  LA severely dilated. Severe MR. Severe TR. IVC dilated     1) New Onset Atrial Flutter  2) Biventricular Failure tachycardia related      Recommendations:  - Continuous telemetric monitoring, past 24hrs reviewed: Atrial Flutter 2:1 @ 140s, asymptomatic   - TTE reviewed recommend repeat once arrythmia controlled, can be done as outpatient  - Monitor electrolytes and replete K to 4 and Mg to 2  - daily weight, monitor strict Intake and Output  - TSH: 0.72  - Continue care per primary team, GDMT as per cardiology team  - c/w Lasix 20mg IV BID, breathing has improved currently on room air saturating >95%, +JVD  - case d/w heart failure team, no issues with stopping beta blockers and starting digoxin  - recommend switching Heparin drip to Eliquis  - recommend NPO after midnight for planned GERALDINE w/ DCCV for tomorrow 11/12/24        EP service [id number] 69 M, no past medical history, presents with intermittent chest pain and SOB x 2 weeks. Patient states he flew home from [place] last night, he had chest pain and SOB on the airplane, which he states "slowly went away" when he got home. Patient states he had similar episodes in the past, he went to his doctor with an unremarkable workup. Patient states he had a "cold" two weeks ago. Endorses  nausea. Denies fever, vomiting, abdominal pain, dizziness, loss of consciousness.    TTE 11/10/24: LVEF 26% severely reduced Left/Right ventricular function.  LA severely dilated. Severe MR. Severe TR. IVC dilated     1) New Onset Atrial Flutter  2) Biventricular Failure tachycardia related      Recommendations:  - Continuous telemetric monitoring, past 24hrs reviewed: Atrial Flutter 2:1 @ 140s, asymptomatic   - TTE reviewed recommend repeat once arrythmia controlled, can be done as outpatient  - Monitor electrolytes and replete K to 4 and Mg to 2  - daily weight, monitor strict Intake and Output  - TSH: 0.72  - Continue care per primary team, GDMT as per cardiology team  - c/w Lasix BID, breathing has improved currently on room air saturating >95%, +JVD  - case d/w heart failure team, no issues with stopping beta blockers and starting digoxin  - recommend switching Heparin drip to Eliquis  - recommend NPO after midnight for planned GERALDINE w/ DCCV for tomorrow 11/12/24        EP service [id number] 69 M, no past medical history, presents with intermittent chest pain and SOB x 2 weeks. Patient states he flew home from [place] last night, he had chest pain and SOB on the airplane, which he states "slowly went away" when he got home. Patient states he had similar episodes in the past, he went to his doctor with an unremarkable workup. Patient states he had a "cold" two weeks ago. Endorses  nausea. Denies fever, vomiting, abdominal pain, dizziness, loss of consciousness.    TTE 11/10/24: LVEF 26% severely reduced Left/Right ventricular function.  LA severely dilated. Severe MR. Severe TR. IVC dilated     1) New Onset Atrial Flutter  2) Acute HFrEF/ Biventricular Failure tachycardia mediate      Recommendations:  - Continuous telemetric monitoring, past 24hrs reviewed: Atrial Flutter 2:1 @ 140s, asymptomatic   - TTE reviewed recommend repeat once arrythmia controlled, can be done as outpatient  - Monitor electrolytes and replete K to 4 and Mg to 2  - daily weight, monitor strict Intake and Output  - TSH: 0.72  - Continue care per primary team, GDMT as per cardiology team  - c/w Lasix BID, breathing has improved currently on room air saturating >95%, +JVD  - case d/w heart failure team, no issues with stopping beta blockers and starting digoxin  - recommend switching Heparin drip to Eliquis  - recommend NPO after midnight for planned GERALDINE w/ DCCV for tomorrow 11/12/24        EP service [id number]

## 2024-11-11 NOTE — PROGRESS NOTE ADULT - PROBLEM SELECTOR PLAN 1
- Pt with atrial flutter in 2:1 block  - Likely been going on for months to years based on history  - EP following. Heparin drip ordered, along with lopressor 25mg TID with hold parameters  - Likely will need ablation vs GERALDINE with cardioversion    - Check TSH (0.72), free T4 1.3  - Monitor on tele. VS q4hrs. Replete lytes - Pt with atrial flutter in 2:1 block  - Likely been going on for months to years based on history  - EP following. Heparin drip ordered, along with lopressor 25mg TID with hold parameters  - Likely will need ablation vs GERALDINE with cardioversion    - Check TSH (0.72), free T4 1.3  - Monitor on tele. VS q4hrs. Replete lytes as needed  - Cardiology onboard, appreciate recs - Pt with atrial flutter in 2:1 block  - Likely been going on for months to years based on history  - EP following. Heparin drip ordered, along with lopressor 25mg TID with hold parameters  - Likely will need ablation vs GERALDINE with cardioversion    - Check TSH (0.72), free T4 1.3  - Monitor on tele. VS q4hrs. Replete lytes as needed  - Cardiology onboard, appreciate recs  - Cw heparin

## 2024-11-11 NOTE — PROGRESS NOTE ADULT - SUBJECTIVE AND OBJECTIVE BOX
HUYEN POPE ([id number])- Patient is a 69y old  Male who presents with a chief complaint of A flutter with new HFrEF (10 Nov 2024 16:41)      INTERVAL HPI/OVERNIGHT EVENTS:   Patient has no acute events overnight,     SUBJECTIVE: Pt seen at bedside;    PHYSICAL EXAM:  - GENERAL: Follows conversation appropriately. No acute distress.  - EYES: Tracks me in room.   - HENT: Moist mucous membranes. No scleral icterus.   - LUNGS: Clear to auscultation bilaterally. No accessory muscle use.  - CARDIOVASCULAR: Regular rate and rhythm. No murmur.  - ABDOMEN: Soft, non-tender and non-distended. No palpable masses.  - EXTREMITIES: No edema. Non-tender.  - SKIN: No rashes or lesions. Warm.  - NEUROLOGIC: No focal neurological deficits. CN II-XII grossly intact, but not individually tested.  - PSYCHIATRIC: Cooperative. Appropriate mood and affect.    Vital Signs Last 24 Hrs  T(C): 36.8 (11 Nov 2024 05:00), Max: 36.8 (11 Nov 2024 05:00)  T(F): 98.3 (11 Nov 2024 05:00), Max: 98.3 (11 Nov 2024 05:00)  HR: 147 (11 Nov 2024 05:00) (143 - 151)  BP: 135/90 (11 Nov 2024 05:00) (99/86 - 146/115)  BP(mean): 93 (10 Nov 2024 13:43) (93 - 93)  RR: 18 (11 Nov 2024 05:00) (16 - 19)  SpO2: 97% (11 Nov 2024 05:00) (95% - 100%)    Parameters below as of 11 Nov 2024 05:00  Patient On (Oxygen Delivery Method): room air        LABS:                          13.2   8.07  )-----------( 194      ( 11 Nov 2024 02:35 )             39.7     11-11    139  |  103  |  25[H]  ----------------------------<  105[H]  4.0   |  20[L]  |  1.31[H]    Ca    8.6      11 Nov 2024 02:35  Phos  4.4     11-11  Mg     2.00     11-11    TPro  6.1  /  Alb  3.5  /  TBili  1.0  /  DBili  x   /  AST  68[H]  /  ALT  93[H]  /  AlkPhos  104  11-11          Urinalysis Basic - ( 11 Nov 2024 02:35 )    Color: x / Appearance: x / SG: x / pH: x  Gluc: 105 mg/dL / Ketone: x  / Bili: x / Urobili: x   Blood: x / Protein: x / Nitrite: x   Leuk Esterase: x / RBC: x / WBC x   Sq Epi: x / Non Sq Epi: x / Bacteria: x      PT/INR - ( 10 Nov 2024 02:25 )   PT: 13.4 sec;   INR: 1.16 ratio         PTT - ( 11 Nov 2024 02:35 )  PTT:155.4 sec  Lactate Trend        CAPILLARY BLOOD GLUCOSE          Medications:  furosemide   Injectable 20 milliGRAM(s) IV Push two times a day  heparin   Injectable 3500 Unit(s) IV Push every 6 hours PRN  heparin   Injectable 7000 Unit(s) IV Push every 6 hours PRN  heparin  Infusion.  Unit(s)/Hr IV Continuous <Continuous>  influenza  Vaccine (HIGH DOSE) 0.5 milliLiter(s) IntraMuscular once  metoprolol tartrate 25 milliGRAM(s) Oral three times a day      RADIOLOGY & ADDITIONAL TESTS were viewed by me personally.   EKG:    HUYEN POPE ([id number])- Patient is a 69y old  Male who presents with a chief complaint of A flutter with new HFrEF (10 Nov 2024 16:41)      INTERVAL HPI/OVERNIGHT EVENTS:   Patient has no acute events overnight. Hr 140s w aflutter throughout the night on tele.    SUBJECTIVE: Pt seen at bedside;    PHYSICAL EXAM:  - GENERAL: Follows conversation appropriately. No acute distress.  - EYES: Tracks me in room.   - HENT: Moist mucous membranes. No scleral icterus.   - LUNGS: Clear to auscultation bilaterally. No accessory muscle use.  - CARDIOVASCULAR: Regular rate and rhythm. No murmur.  - ABDOMEN: Soft, non-tender and non-distended. No palpable masses.  - EXTREMITIES: No edema. Non-tender.  - SKIN: No rashes or lesions. Warm.  - NEUROLOGIC: No focal neurological deficits. CN II-XII grossly intact, but not individually tested.  - PSYCHIATRIC: Cooperative. Appropriate mood and affect.    Vital Signs Last 24 Hrs  T(C): 36.8 (11 Nov 2024 05:00), Max: 36.8 (11 Nov 2024 05:00)  T(F): 98.3 (11 Nov 2024 05:00), Max: 98.3 (11 Nov 2024 05:00)  HR: 147 (11 Nov 2024 05:00) (143 - 151)  BP: 135/90 (11 Nov 2024 05:00) (99/86 - 146/115)  BP(mean): 93 (10 Nov 2024 13:43) (93 - 93)  RR: 18 (11 Nov 2024 05:00) (16 - 19)  SpO2: 97% (11 Nov 2024 05:00) (95% - 100%)    Parameters below as of 11 Nov 2024 05:00  Patient On (Oxygen Delivery Method): room air        LABS:                          13.2   8.07  )-----------( 194      ( 11 Nov 2024 02:35 )             39.7     11-11    139  |  103  |  25[H]  ----------------------------<  105[H]  4.0   |  20[L]  |  1.31[H]    Ca    8.6      11 Nov 2024 02:35  Phos  4.4     11-11  Mg     2.00     11-11    TPro  6.1  /  Alb  3.5  /  TBili  1.0  /  DBili  x   /  AST  68[H]  /  ALT  93[H]  /  AlkPhos  104  11-11          Urinalysis Basic - ( 11 Nov 2024 02:35 )    Color: x / Appearance: x / SG: x / pH: x  Gluc: 105 mg/dL / Ketone: x  / Bili: x / Urobili: x   Blood: x / Protein: x / Nitrite: x   Leuk Esterase: x / RBC: x / WBC x   Sq Epi: x / Non Sq Epi: x / Bacteria: x      PT/INR - ( 10 Nov 2024 02:25 )   PT: 13.4 sec;   INR: 1.16 ratio         PTT - ( 11 Nov 2024 02:35 )  PTT:155.4 sec  Lactate Trend        CAPILLARY BLOOD GLUCOSE          Medications:  furosemide   Injectable 20 milliGRAM(s) IV Push two times a day  heparin   Injectable 3500 Unit(s) IV Push every 6 hours PRN  heparin   Injectable 7000 Unit(s) IV Push every 6 hours PRN  heparin  Infusion.  Unit(s)/Hr IV Continuous <Continuous>  influenza  Vaccine (HIGH DOSE) 0.5 milliLiter(s) IntraMuscular once  metoprolol tartrate 25 milliGRAM(s) Oral three times a day      RADIOLOGY & ADDITIONAL TESTS were viewed by me personally.   EKG:    HUYEN POPE ([id number])- Patient is a 69y old  Male who presents with a chief complaint of A flutter with new HFrEF (10 Nov 2024 16:41)      INTERVAL HPI/OVERNIGHT EVENTS:   Patient has no acute events overnight. Hr 140s w aflutter throughout the night on tele.    SUBJECTIVE: Pt seen at bedside; Denies fever, chills, chest pain, SOB.     PHYSICAL EXAM:  - GENERAL: Follows conversation appropriately. No acute distress.  - EYES: Tracks me in room.   - HENT: Moist mucous membranes. No scleral icterus.   - LUNGS: Clear to auscultation bilaterally. No accessory muscle use.  - CARDIOVASCULAR: Regular rate, tachycardic. No murmur.  - ABDOMEN: Soft, non-tender and non-distended. No palpable masses.  - EXTREMITIES: No edema. Non-tender.  - SKIN: No rashes or lesions. Warm.  - NEUROLOGIC: No focal neurological deficits. CN II-XII grossly intact, but not individually tested.  - PSYCHIATRIC: Cooperative. Appropriate mood and affect.    Vital Signs Last 24 Hrs  T(C): 36.8 (11 Nov 2024 05:00), Max: 36.8 (11 Nov 2024 05:00)  T(F): 98.3 (11 Nov 2024 05:00), Max: 98.3 (11 Nov 2024 05:00)  HR: 147 (11 Nov 2024 05:00) (143 - 151)  BP: 135/90 (11 Nov 2024 05:00) (99/86 - 146/115)  BP(mean): 93 (10 Nov 2024 13:43) (93 - 93)  RR: 18 (11 Nov 2024 05:00) (16 - 19)  SpO2: 97% (11 Nov 2024 05:00) (95% - 100%)    Parameters below as of 11 Nov 2024 05:00  Patient On (Oxygen Delivery Method): room air        LABS:                          13.2   8.07  )-----------( 194      ( 11 Nov 2024 02:35 )             39.7     11-11    139  |  103  |  25[H]  ----------------------------<  105[H]  4.0   |  20[L]  |  1.31[H]    Ca    8.6      11 Nov 2024 02:35  Phos  4.4     11-11  Mg     2.00     11-11    TPro  6.1  /  Alb  3.5  /  TBili  1.0  /  DBili  x   /  AST  68[H]  /  ALT  93[H]  /  AlkPhos  104  11-11          Urinalysis Basic - ( 11 Nov 2024 02:35 )    Color: x / Appearance: x / SG: x / pH: x  Gluc: 105 mg/dL / Ketone: x  / Bili: x / Urobili: x   Blood: x / Protein: x / Nitrite: x   Leuk Esterase: x / RBC: x / WBC x   Sq Epi: x / Non Sq Epi: x / Bacteria: x      PT/INR - ( 10 Nov 2024 02:25 )   PT: 13.4 sec;   INR: 1.16 ratio         PTT - ( 11 Nov 2024 02:35 )  PTT:155.4 sec  Lactate Trend        CAPILLARY BLOOD GLUCOSE          Medications:  furosemide   Injectable 20 milliGRAM(s) IV Push two times a day  heparin   Injectable 3500 Unit(s) IV Push every 6 hours PRN  heparin   Injectable 7000 Unit(s) IV Push every 6 hours PRN  heparin  Infusion.  Unit(s)/Hr IV Continuous <Continuous>  influenza  Vaccine (HIGH DOSE) 0.5 milliLiter(s) IntraMuscular once  metoprolol tartrate 25 milliGRAM(s) Oral three times a day      RADIOLOGY & ADDITIONAL TESTS were viewed by me personally.   EKG:

## 2024-11-11 NOTE — CONSULT NOTE ADULT - NS ATTEND AMEND GEN_ALL_CORE FT
Mr. Lugo is a 68 y/o male from [place] no known past medical history who presented on 11/10 with intermittent chest pain and SOB x 2 weeks. Per patient, had been well until Covid in 2020 after which he reports having some fatigue. Reports this past September had worsening SOB and was scheduled to see cardiologist in November but symptoms worsened that he came to ER. On arrival, VS noted , /110, 99% RA. An echo was obtained which showed severe biventricular dysfunction with LVEF of 15-20%, LVIDD 4.8cm, moderate to severe MR, LVOT VTI 7.   Labs show proBNP 1831, BUN/Cr 25/1.31, AST 68, ALT 93, A1C 5.7. CXR shows clear lungs. He was also noted to be in atrial flutter with rapid ventricular rate to 140s. He was found to be in ADHF, and started on Lasix 20 IV BID and lopressor for flutter w RVR. HF service consulted on 11/11.  He states he still feels some difficulty breathing and is not back to his baseline. Denies CP, palpitations, dizziness. Denies history of CAD/MI. Admits to never having a PMD. He works as a peraza. Denies smoking or ETOH use. His daugher Viji was present in room and help articulate history of events. Mr. Lugo is a 68 y/o male from [place] no known past medical history who presented on 11/10 with intermittent chest pain and SOB x 2 weeks. Per patient, had been well until Covid in 2020 after which he reports having some fatigue. Reports this past September had worsening SOB and was scheduled to see cardiologist in November but symptoms worsened that he came to ER. On arrival, VS noted , /110, 99% RA. Found to be in aflutter. An echo was obtained which showed severe biventricular dysfunction with LVEF of 15-20%, LVIDD 4.8cm, moderate to severe MR, LVOT VTI 7. Was placed on lopressor and lasix. Feels worsening vertigo. On exam, NAD, JVP elevated, tachycardic, irreg irreg, CTAB, nontender abdomen, no edema. Labs reviewed - AST/ALT 68/93, albumin 3.5, BNP 1831. CXR shows clear lungs. TTE reviewed 11/10/24 - EF 20-25%, LVEDD 4.8 cm, severe LAE, mod MR, RV dysfunction, LVOT VTI 7 cm, IVC 3 cm. ECG - flutter, nl axis. Overall stage C HF, NYHA class III with volume overload with severe biventricular dysfunction in setting of aflutter.   - d/c BB  - start dig 0.5 mg x1, then 0.25 mg q6h x2  - increase lasix to 40 mg IV q8  - start hydral 10 mg q8h given Cr 1.3 for now; will transition to ARB/ARNI if stabilizes  - standing weights daily  - possible GERALDINE/DCCV once further optimized  - d/w patient disease process

## 2024-11-11 NOTE — PROVIDER CONTACT NOTE (CRITICAL VALUE NOTIFICATION) - ACTION/TREATMENT ORDERED:
T6 Shankar Baca notified. According to the nomogram, pause heparin drip for 1 hour and restart at new rate of 13mL/hr.

## 2024-11-11 NOTE — PROGRESS NOTE ADULT - ASSESSMENT
69 YOM with no reported medical history coming in with 2 weeks of intermittent chest pain and worsening shortness of breath, found to have atrial flutter with new onset biventricular failure. 69 YOM with no reported medical history coming in with 2 weeks of intermittent chest pain and worsening shortness of breath, found to have atrial flutter with new onset biventricular failure. Severe MR. Pt clinically euvolemic.

## 2024-11-11 NOTE — DISCHARGE NOTE PROVIDER - NSDCMRMEDTOKEN_GEN_ALL_CORE_FT
apixaban 5 mg oral tablet: 1 tab(s) orally every 12 hours  hydrALAZINE 10 mg oral tablet: 1 tab(s) orally every 8 hours  spironolactone 25 mg oral tablet: 1 tab(s) orally once a day   apixaban 5 mg oral tablet: 1 tab(s) orally every 12 hours  dapagliflozin 10 mg oral tablet: 1 tab(s) orally once a day  furosemide 40 mg oral tablet: 1 tab(s) orally once a day x 30 days as needed for weight gain Please take 1 tab once a day if you experience weight gain of 2-3 pounds within 24hrs, swelling of your legs or shortness of breath. Please also call your cardiologist if you experience these symptoms  hydrALAZINE 10 mg oral tablet: 1 tab(s) orally every 8 hours  sacubitril-valsartan 24 mg-26 mg oral tablet: 1 tab(s) orally 2 times a day  spironolactone 25 mg oral tablet: 1 tab(s) orally once a day   amiodarone 200 mg oral tablet: 1 tab(s) orally once a day Please take 2 tablets (400mg) every 8 hours until Saturday 11/16, then take 1 tablet (200mg) daily starting Sunday 11/17  apixaban 5 mg oral tablet: 1 tab(s) orally every 12 hours  dapagliflozin 10 mg oral tablet: 1 tab(s) orally once a day  furosemide 40 mg oral tablet: 1 tab(s) orally once a day x 30 days as needed for weight gain Please take 1 tab once a day if you experience weight gain of 2-3 pounds within 24hrs, swelling of your legs or shortness of breath. Please also call your cardiologist if you experience these symptoms  metoprolol succinate 25 mg oral tablet, extended release: 0.5 tab(s) orally every 12 hours Take 12.5mg (1/2 tablet) every 12 hours.  sacubitril-valsartan 24 mg-26 mg oral tablet: 1 tab(s) orally 2 times a day  spironolactone 25 mg oral tablet: 1 tab(s) orally once a day

## 2024-11-11 NOTE — PROGRESS NOTE ADULT - PROBLEM SELECTOR PLAN 3
- pt reports episodes of presyncope since childhood  - pt feels dizziness (vertigo) followed by faintness for 30 seconds, episodes occur about once a year  - denies abnormal movements, urination, tongue biting     Plan:  > Management of flutter and heart failure as above  > fu CTH  > neuro consult in AM  > monitor on tele - pt reports episodes of presyncope since childhood  - pt feels dizziness (vertigo) followed by faintness for 30 seconds, episodes occur about once a year  - denies abnormal movements, urination, tongue biting     Plan:  > Management of flutter and heart failure as above  > fu CTH  > Likely cardiogenic  > monitor on tele

## 2024-11-11 NOTE — DISCHARGE NOTE PROVIDER - NSFOLLOWUPCLINICS_GEN_ALL_ED_FT
[facility]  Pulmonology  [address] [place] [address], [place] [address]  Phone: [phone]  Fax:   Follow Up Time: 2 weeks     [facility]  Pulmonary  [place]   Phone:   Fax:   Follow Up Time: 1-3 days

## 2024-11-12 ENCOUNTER — RESULT REVIEW (OUTPATIENT)
Age: 69
End: 2024-11-12

## 2024-11-12 DIAGNOSIS — G47.33 OBSTRUCTIVE SLEEP APNEA (ADULT) (PEDIATRIC): ICD-10-CM

## 2024-11-12 LAB
ANION GAP SERPL CALC-SCNC: 17 MMOL/L — HIGH (ref 7–14)
APTT BLD: 31.4 SEC — SIGNIFICANT CHANGE UP (ref 24.5–35.6)
BLD GP AB SCN SERPL QL: NEGATIVE — SIGNIFICANT CHANGE UP
BUN SERPL-MCNC: 24 MG/DL — HIGH (ref 7–23)
CALCIUM SERPL-MCNC: 8.9 MG/DL — SIGNIFICANT CHANGE UP (ref 8.4–10.5)
CHLORIDE SERPL-SCNC: 101 MMOL/L — SIGNIFICANT CHANGE UP (ref 98–107)
CO2 SERPL-SCNC: 22 MMOL/L — SIGNIFICANT CHANGE UP (ref 22–31)
CREAT SERPL-MCNC: 1.33 MG/DL — HIGH (ref 0.5–1.3)
EGFR: 58 ML/MIN/1.73M2 — LOW
GLUCOSE SERPL-MCNC: 93 MG/DL — SIGNIFICANT CHANGE UP (ref 70–99)
HCT VFR BLD CALC: 42 % — SIGNIFICANT CHANGE UP (ref 39–50)
HGB BLD-MCNC: 13.6 G/DL — SIGNIFICANT CHANGE UP (ref 13–17)
INR BLD: 1.45 RATIO — HIGH (ref 0.85–1.16)
MAGNESIUM SERPL-MCNC: 1.7 MG/DL — SIGNIFICANT CHANGE UP (ref 1.6–2.6)
MCHC RBC-ENTMCNC: 26.8 PG — LOW (ref 27–34)
MCHC RBC-ENTMCNC: 32.4 G/DL — SIGNIFICANT CHANGE UP (ref 32–36)
MCV RBC AUTO: 82.8 FL — SIGNIFICANT CHANGE UP (ref 80–100)
NRBC # BLD: 0 /100 WBCS — SIGNIFICANT CHANGE UP (ref 0–0)
NRBC # FLD: 0 K/UL — SIGNIFICANT CHANGE UP (ref 0–0)
PHOSPHATE SERPL-MCNC: 3.9 MG/DL — SIGNIFICANT CHANGE UP (ref 2.5–4.5)
PLATELET # BLD AUTO: 193 K/UL — SIGNIFICANT CHANGE UP (ref 150–400)
POTASSIUM SERPL-MCNC: 3.4 MMOL/L — LOW (ref 3.5–5.3)
POTASSIUM SERPL-SCNC: 3.4 MMOL/L — LOW (ref 3.5–5.3)
PROTHROM AB SERPL-ACNC: 16.8 SEC — HIGH (ref 9.9–13.4)
RBC # BLD: 5.07 M/UL — SIGNIFICANT CHANGE UP (ref 4.2–5.8)
RBC # FLD: 13.7 % — SIGNIFICANT CHANGE UP (ref 10.3–14.5)
RH IG SCN BLD-IMP: POSITIVE — SIGNIFICANT CHANGE UP
SODIUM SERPL-SCNC: 140 MMOL/L — SIGNIFICANT CHANGE UP (ref 135–145)
WBC # BLD: 9.27 K/UL — SIGNIFICANT CHANGE UP (ref 3.8–10.5)
WBC # FLD AUTO: 9.27 K/UL — SIGNIFICANT CHANGE UP (ref 3.8–10.5)

## 2024-11-12 PROCEDURE — 93010 ELECTROCARDIOGRAM REPORT: CPT

## 2024-11-12 PROCEDURE — 93325 DOPPLER ECHO COLOR FLOW MAPG: CPT | Mod: 26,GC

## 2024-11-12 PROCEDURE — 76376 3D RENDER W/INTRP POSTPROCES: CPT | Mod: 26

## 2024-11-12 PROCEDURE — 93320 DOPPLER ECHO COMPLETE: CPT | Mod: 26,GC

## 2024-11-12 PROCEDURE — 93312 ECHO TRANSESOPHAGEAL: CPT | Mod: 26

## 2024-11-12 PROCEDURE — 99233 SBSQ HOSP IP/OBS HIGH 50: CPT

## 2024-11-12 PROCEDURE — 99233 SBSQ HOSP IP/OBS HIGH 50: CPT | Mod: GC

## 2024-11-12 PROCEDURE — 99231 SBSQ HOSP IP/OBS SF/LOW 25: CPT

## 2024-11-12 RX ORDER — FUROSEMIDE 40 MG
40 TABLET ORAL DAILY
Refills: 0 | Status: DISCONTINUED | OUTPATIENT
Start: 2024-11-13 | End: 2024-11-14

## 2024-11-12 RX ORDER — AMIODARONE HCL 200 MG
TABLET ORAL
Refills: 0 | Status: DISCONTINUED | OUTPATIENT
Start: 2024-11-12 | End: 2024-11-14

## 2024-11-12 RX ORDER — POTASSIUM CHLORIDE 10 MEQ
10 TABLET, EXTENDED RELEASE ORAL ONCE
Refills: 0 | Status: DISCONTINUED | OUTPATIENT
Start: 2024-11-12 | End: 2024-11-12

## 2024-11-12 RX ORDER — AMIODARONE HCL 200 MG
200 TABLET ORAL DAILY
Refills: 0 | Status: CANCELLED | OUTPATIENT
Start: 2024-11-16 | End: 2024-11-14

## 2024-11-12 RX ORDER — POTASSIUM CHLORIDE 10 MEQ
10 TABLET, EXTENDED RELEASE ORAL
Refills: 0 | Status: COMPLETED | OUTPATIENT
Start: 2024-11-12 | End: 2024-11-12

## 2024-11-12 RX ORDER — CHLORHEXIDINE GLUCONATE 40 MG/ML
1 SOLUTION TOPICAL DAILY
Refills: 0 | Status: DISCONTINUED | OUTPATIENT
Start: 2024-11-12 | End: 2024-11-14

## 2024-11-12 RX ORDER — METOPROLOL TARTRATE 50 MG
12.5 TABLET ORAL EVERY 12 HOURS
Refills: 0 | Status: DISCONTINUED | OUTPATIENT
Start: 2024-11-12 | End: 2024-11-14

## 2024-11-12 RX ORDER — METOPROLOL TARTRATE 50 MG
12.5 TABLET ORAL EVERY 12 HOURS
Refills: 0 | Status: DISCONTINUED | OUTPATIENT
Start: 2024-11-12 | End: 2024-11-12

## 2024-11-12 RX ORDER — AMIODARONE HCL 200 MG
400 TABLET ORAL EVERY 8 HOURS
Refills: 0 | Status: DISCONTINUED | OUTPATIENT
Start: 2024-11-12 | End: 2024-11-14

## 2024-11-12 RX ADMIN — CHLORHEXIDINE GLUCONATE 1 APPLICATION(S): 40 SOLUTION TOPICAL at 17:32

## 2024-11-12 RX ADMIN — APIXABAN 5 MILLIGRAM(S): 5 TABLET, FILM COATED ORAL at 06:25

## 2024-11-12 RX ADMIN — Medication 12.5 MILLIGRAM(S): at 17:34

## 2024-11-12 RX ADMIN — HYDRALAZINE HYDROCHLORIDE 10 MILLIGRAM(S): 50 TABLET, FILM COATED ORAL at 17:33

## 2024-11-12 RX ADMIN — Medication 40 MILLIGRAM(S): at 06:30

## 2024-11-12 RX ADMIN — Medication 400 MILLIGRAM(S): at 17:29

## 2024-11-12 RX ADMIN — HYDRALAZINE HYDROCHLORIDE 10 MILLIGRAM(S): 50 TABLET, FILM COATED ORAL at 06:27

## 2024-11-12 RX ADMIN — Medication 100 MILLIEQUIVALENT(S): at 09:39

## 2024-11-12 RX ADMIN — HYDRALAZINE HYDROCHLORIDE 10 MILLIGRAM(S): 50 TABLET, FILM COATED ORAL at 21:24

## 2024-11-12 RX ADMIN — Medication 100 MILLIEQUIVALENT(S): at 08:36

## 2024-11-12 RX ADMIN — Medication 25 MILLIGRAM(S): at 06:27

## 2024-11-12 RX ADMIN — Medication 100 MILLIEQUIVALENT(S): at 07:28

## 2024-11-12 RX ADMIN — Medication 250 MICROGRAM(S): at 06:29

## 2024-11-12 RX ADMIN — APIXABAN 5 MILLIGRAM(S): 5 TABLET, FILM COATED ORAL at 17:33

## 2024-11-12 NOTE — PROGRESS NOTE ADULT - PROBLEM SELECTOR PLAN 2
Euvolemic and is normotensive today.  D/c IV Lasix and start Lasix 40 mg po daily tomorrow.  Start Toprol 12.5 mg po daily this evening, hold for SBP <90, HR< 60.   Continue hydralazine 10 mg po q 8 hrs, hold for SBP <90.  Continue spironolactone 25 mg daily. Hold for SBP <90.   Discussed getting LHC to r/o coronary disease tomorrow. Keep NPO past MN.   Will eventually add ARNI and SGLT2 after cath.    Keep K 4.0-5.0 and mag >2.0.   Daily standing weights and strict I/O.   Informed primary team of the above. Euvolemic and is normotensive today.  D/c IV Lasix and start Lasix 40 mg po daily tomorrow.  Start Toprol 12.5 mg po daily this evening, hold for SBP <90, HR< 60.   Continue hydralazine 10 mg po q 8 hrs, hold for SBP <90.  Continue spironolactone 25 mg daily. Hold for SBP <90.   Discussed getting LHC to r/o coronary disease tomorrow. Keep NPO past MN.   Will eventually add ARNI and SGLT2 after cath.    Keep K 4.0-5.0 and mag >2.0. Got KCL supp today. Recheck BMP with mag this evening.   Daily standing weights and strict I/O.   Informed primary team of the above. Euvolemic and is normotensive today.  D/c IV Lasix and start Lasix 40 mg po daily tomorrow.  Start Toprol 12.5 mg po daily this evening, hold for SBP <90, HR< 60.   Continue hydralazine 10 mg po q 8 hrs, hold for SBP <90.  Continue spironolactone 25 mg daily. Hold for SBP <90.   Discussed getting LHC to r/o coronary disease tomorrow. Keep NPO past MN.   Will eventually add ARNI and SGLT2 after cath.    Keep K 4.0-5.0 and mag >2.0. Got KCL supp today. Recheck BMP with mag this evening.   Daily standing weights and strict I/O. Euvolemic and is normotensive today.  D/c IV Lasix and start Lasix 40 mg po daily tomorrow.  Start Toprol 12.5 mg po daily this evening, hold for SBP <90, HR< 60.   Continue hydralazine 10 mg po q 8 hrs, hold for SBP <90.  Continue spironolactone 25 mg daily. Hold for SBP <90.   Discussed getting LHC to r/o coronary disease tomorrow. Keep NPO past MN.   Will eventually add ARNI and SGLT2 after cath.    Keep K 4.0-5.0 and mag >2.0. Got KCL supp today. Recheck BMP with mag this evening.   Daily standing weights and strict I/O.  Will need outpatient sleep study to r/o sleep apnea. Euvolemic and is normotensive today.  D/c IV Lasix and start Lasix 40 mg po daily tomorrow.  Start Toprol 12.5 mg po BID this evening, hold for SBP <90, HR< 60.   Continue hydralazine 10 mg po q 8 hrs, hold for SBP <90.  Continue spironolactone 25 mg daily. Hold for SBP <90.   Discussed getting RHC/LHC tomorrow. Keep NPO past MN.   Will eventually add ARNI and SGLT2 after cath.    Keep K 4.0-5.0 and mag >2.0. Got KCL supp today. Recheck BMP with mag this evening.   Daily standing weights and strict I/O.  Will need outpatient sleep study to r/o sleep apnea.

## 2024-11-12 NOTE — PROGRESS NOTE ADULT - PROBLEM SELECTOR PLAN 2
- TTE 11/10/14 with biventricular failure. LVEF 26% global hypokinesis  - BNP 1831  - Likely tachy mediated ISO above. Has severely dilated atria  - Clinically without signs or symptoms of volume overload, but large and non-collapsible IVC on TTE  - Intermittently having narrow pulse pressures. At high risk of decompensation/cardiogenic shock    - Continue with lasix IV 20mg BID. Monitor I/O, daily weight  - Check a1c (5.7) and lipids (unremarkable, low HDL)  - HF consult 11/11. Introduce GDMT pending management of atrial flutter  - strict I/Os, 1,200mL fluid restriction - TTE 11/10/14 with biventricular failure. LVEF 26% global hypokinesis  - BNP 1831  - Likely tachy mediated ISO above. Has severely dilated atria  - Clinically without signs or symptoms of volume overload, but large and non-collapsible IVC on TTE  - Intermittently having narrow pulse pressures. At high risk of decompensation/cardiogenic shock    - Continue with lasix IV 20mg BID. Monitor I/O, daily weight  - Check a1c (5.7) and lipids (unremarkable, low HDL)  - HF consult 11/11. Introduce GDMT pending management of atrial flutter       > started on spironolactone 11/11  - strict I/Os, 1,200mL fluid restriction

## 2024-11-12 NOTE — ASU PREOP CHECKLIST - SURGICAL CONSENT
2022      Dallas Devine  [address] [facility] [address]        Dear Dallas Devine,    My office is sending a friendly reminder.  Our records indicate that your recently ordered lab work has not been completed at this time. These tests are very important in the management of your healthcare.    The test may be completed at your convenience without an appointment.  {FASTING/NONFASTIN}. The order(s) will  in 30 days.    {RESULT:[id number]} If you have any questions or concerns, please do not hesitate to contact me during normal business office hours. If the orders cancel again, I will assume that you no longer wish to have these done.    Sincerely,        {[facility] PHYSICIAN NAMES:[id number]}  [facility]  [phone]      Labs ordered are: ***  
        2022         Dallas Devine  [address] [facility] [address]        Dear Dallas,      Our records indicate that you are due for lab work that was ordered by Wicho Fabian. These tests are very important in the management of your healthcare. Please go to any [facility] lab at your earliest convenience to have this lab work done and we will contact you when we have the results.    The orders will be valid for 120 days. If the orders , we will assume that you no longer wish to have these done. If you have any questions or concerns, please do not hesitate to contact our office.       Sincerely,        Dr Wicho Fabian   Neurology Department  [id number] [place] [address] [phone]          Labs ordered are:  Glucose level           
GERALDINE/done

## 2024-11-12 NOTE — PROGRESS NOTE ADULT - PROBLEM SELECTOR PLAN 5
Diet: Dash 1200cc/24hr fluid restriction   DVT Proph: Heparin gtt  Dispo: Pending course - mild transaminitis on labs in ED  - Hep C negative on prior admission  - Likely iso hepatic congestion    Plan:  > trend LFTs  > fu lipid panel - WNL  > Liver US if LFTs do not improve

## 2024-11-12 NOTE — PROGRESS NOTE ADULT - PROBLEM SELECTOR PLAN 4
- mild transaminitis on labs in ED  - Hep C negative on prior admission  - Likely iso hepatic congestion    Plan:  > trend LFTs  > fu lipid panel - WNL  > Liver US if LFTs do not improve - Episode of hypoxia (85%) overnight 11/11  - Pt reports increased day time sleepiness and decreased sleep latency  - Son reports witnessed apneic episode    Plan:  - Sleep study o/p  - cw O2 while asleep - Episode of hypoxia (85%) overnight 11/11  - Pt reports increased day time sleepiness and decreased sleep latency  - Son reports witnessed apneic episode    Plan:  - Sleep study o/p  - CPAP

## 2024-11-12 NOTE — PROGRESS NOTE ADULT - ASSESSMENT
69 YOM with no reported medical history coming in with 2 weeks of intermittent chest pain and worsening shortness of breath, found to have atrial flutter with new onset biventricular failure. Severe MR.  69 YOM with no reported medical history coming in with 2 weeks of intermittent chest pain and worsening shortness of breath, found to have atrial flutter with new onset biventricular failure. Severe MR. Plan for EGD w DCCV 11/12.

## 2024-11-12 NOTE — PROGRESS NOTE ADULT - SUBJECTIVE AND OBJECTIVE BOX
Medications:  apixaban 5 milliGRAM(s) Oral every 12 hours  furosemide   Injectable 40 milliGRAM(s) IV Push every 8 hours  hydrALAZINE 10 milliGRAM(s) Oral every 8 hours  influenza  Vaccine (HIGH DOSE) 0.5 milliLiter(s) IntraMuscular once  spironolactone 25 milliGRAM(s) Oral daily      Vitals:  Vital Signs Last 24 Hrs  T(C): 36.6 (2024 08:00), Max: 36.9 (2024 06:00)  T(F): 97.8 (2024 08:00), Max: 98.4 (2024 06:00)  HR: 107 (2024 08:00) (67 - 145)  BP: 124/90 (2024 08:00) (108/88 - 130/77)  BP(mean): --  RR: 16 (2024 08:00) (16 - 18)  SpO2: 92% (2024 08:00) (92% - 99%)    Parameters below as of 2024 08:00  Patient On (Oxygen Delivery Method): nasal cannula, 2L        Daily     Daily Weight in k.2 (2024 07:41)    I&O's Detail      Physical Exam:     General: No distress. Comfortable.  HEENT: EOM intact.  Neck: Neck supple. JVP not elevated. No masses  Chest: Clear to auscultation bilaterally  CV: Normal S1 and S2. No murmurs, rub, or gallops. Radial pulses normal.  Abdomen: Soft, non-distended, non-tender  Skin: No rashes or skin breakdown  Neurology: Alert and oriented times three. Sensation intact  Psych: Affect normal    Labs:                        13.6   9.27  )-----------( 193      ( 2024 05:00 )             42.0     11-12    140  |  101  |  24[H]  ----------------------------<  93  3.4[L]   |  22  |  1.33[H]    Ca    8.9      2024 05:00  Phos  3.9     11-12  Mg     1.70     11-12    TPro  6.1  /  Alb  3.5  /  TBili  1.0  /  DBili  x   /  AST  68[H]  /  ALT  93[H]  /  AlkPhos  104  11-11    PT/INR - ( 2024 05:00 )   PT: 16.8 sec;   INR: 1.45 ratio         PTT - ( 2024 05:00 )  PTT:31.4 sec       Patient seen and examined. He states he feels great, no SOB at rest, orthopnea, PND. No PORRAS.   He admits to voiding a lot in 24 hrs, I/O not recorded.   Son by bedside and answered all questions.       Medications:  apixaban 5 milliGRAM(s) Oral every 12 hours  furosemide   Injectable 40 milliGRAM(s) IV Push every 8 hours  hydrALAZINE 10 milliGRAM(s) Oral every 8 hours  influenza  Vaccine (HIGH DOSE) 0.5 milliLiter(s) IntraMuscular once  spironolactone 25 milliGRAM(s) Oral daily      Vitals:  Vital Signs Last 24 Hrs  T(C): 36.6 (2024 08:00), Max: 36.9 (2024 06:00)  T(F): 97.8 (2024 08:00), Max: 98.4 (2024 06:00)  HR: 107 (2024 08:00) (67 - 145)  BP: 124/90 (2024 08:00) (108/88 - 130/77)  BP(mean): --  RR: 16 (2024 08:00) (16 - 18)  SpO2: 92% (2024 08:00) (92% - 99%)    Parameters below as of 2024 08:00  Patient On (Oxygen Delivery Method): nasal cannula, 2L        Daily     Daily Weight in k.2 (2024 07:41)    I&O's Detail      Physical Exam:     General: No distress. Comfortable.  HEENT: EOM intact.  Neck: Neck supple. JVP normal 6cm. No masses  Chest: Clear to auscultation bilaterally  CV: Normal S1 and S2. No murmurs, rub, or gallops. Radial pulses normal. No LE edema and is warm b/l.   Abdomen: Soft, non-distended, non-tender  Skin: No rashes or skin breakdown  Neurology: Alert and oriented times three. Sensation intact  Psych: Affect normal    Labs:                        13.6   9.27  )-----------( 193      ( 2024 05:00 )             42.0     11-12    140  |  101  |  24[H]  ----------------------------<  93  3.4[L]   |  22  |  1.33[H]    Ca    8.9      2024 05:00  Phos  3.9     -12  Mg     1.70         TPro  6.1  /  Alb  3.5  /  TBili  1.0  /  DBili  x   /  AST  68[H]  /  ALT  93[H]  /  AlkPhos  104  11-11    PT/INR - ( 2024 05:00 )   PT: 16.8 sec;   INR: 1.45 ratio         PTT - ( 2024 05:00 )  PTT:31.4 sec

## 2024-11-12 NOTE — PROGRESS NOTE ADULT - SUBJECTIVE AND OBJECTIVE BOX
Interval History:  No acute events overnight  Telemetry: AFlutter 90s; up to 160s overnight when ambulating     MEDICATIONS  (STANDING):  apixaban 5 milliGRAM(s) Oral every 12 hours  furosemide   Injectable 40 milliGRAM(s) IV Push every 8 hours  hydrALAZINE 10 milliGRAM(s) Oral every 8 hours  influenza  Vaccine (HIGH DOSE) 0.5 milliLiter(s) IntraMuscular once  spironolactone 25 milliGRAM(s) Oral daily    MEDICATIONS  (PRN):    Vital Signs Last 24 Hrs  T(C): 36.6 (11-12-24 @ 08:00), Max: 36.9 (11-12-24 @ 06:00)  T(F): 97.8 (11-12-24 @ 08:00), Max: 98.4 (11-12-24 @ 06:00)  HR: 107 (11-12-24 @ 08:00) (67 - 145)  BP: 124/90 (11-12-24 @ 08:00) (108/88 - 130/77)  BP(mean): --  RR: 16 (11-12-24 @ 08:00) (16 - 18)  SpO2: 92% (11-12-24 @ 08:00) (92% - 99%)    Appearance: Normal	  HEENT:   Normal oral mucosa, PERRL, EOMI	  Lymphatic: No lymphadenopathy  Cardiovascular: No JVD, No murmurs, No edema  Respiratory: Lungs clear to auscultation	  Psychiatry: A & O x 3, Mood & affect appropriate  Gastrointestinal:  Soft, Non-tender, + BS	  Skin: No rashes, No ecchymoses, No cyanosis	  Neurologic: Non-focal  Extremities: Normal range of motion, No clubbing, cyanosis or edema  Vascular: Peripheral pulses palpable 2+ bilaterally    LABS:	 	  CBC Full  -  ( 12 Nov 2024 05:00 )  WBC Count : 9.27 K/uL  Hemoglobin : 13.6 g/dL  Hematocrit : 42.0 %  Platelet Count - Automated : 193 K/uL  Mean Cell Volume : 82.8 fL  Mean Cell Hemoglobin : 26.8 pg  Mean Cell Hemoglobin Concentration : 32.4 g/dL  Auto Neutrophil # : x  Auto Lymphocyte # : x  Auto Monocyte # : x  Auto Eosinophil # : x  Auto Basophil # : x  Auto Neutrophil % : x  Auto Lymphocyte % : x  Auto Monocyte % : x  Auto Eosinophil % : x  Auto Basophil % : x    11-12    140  |  101  |  24[H]  ----------------------------<  93  3.4[L]   |  22  |  1.33[H]  11-11    139  |  103  |  25[H]  ----------------------------<  105[H]  4.0   |  20[L]  |  1.31[H]    Ca    8.9      12 Nov 2024 05:00  Ca    8.6      11 Nov 2024 02:35  Phos  3.9     11-12  Phos  4.4     11-11  Mg     1.70     11-12  Mg     2.00     11-11    TPro  6.1  /  Alb  3.5  /  TBili  1.0  /  DBili  x   /  AST  68[H]  /  ALT  93[H]  /  AlkPhos  104  11-11  TPro  6.7  /  Alb  3.6  /  TBili  1.0  /  DBili  x   /  AST  63[H]  /  ALT  89[H]  /  AlkPhos  103  11-10    PT/INR - ( 12 Nov 2024 05:00 )   PT: 16.8 sec;   INR: 1.45 ratio         PTT - ( 12 Nov 2024 05:00 )  PTT:31.4 sec    LIVER FUNCTIONS - ( 11 Nov 2024 02:35 )  Alb: 3.5 g/dL / Pro: 6.1 g/dL / ALK PHOS: 104 U/L / ALT: 93 U/L / AST: 68 U/L / GGT: x

## 2024-11-12 NOTE — PROGRESS NOTE ADULT - ASSESSMENT
68 y/o male from [place], not previously followed by a PMD, with no known past medical history, presents with intermittent chest pain and SOB x 2 weeks. Overall he states he has noticed that he 'did not feel like superman" ever since he had COVID19 in 2020. He admits to fatigue and SOB ever since then. Lately he has experienced worsening SOB and decided to come to ED. TTE on 11/10/24 shows new diagnosis of HFrEF with biventricular dysfunction with LVEF of 15-20%, LVIDD 4.8cm, moderate to severe MR, VTI 7.   Labs show proBNP 1831, BUN/Cr 25/1.31, AST 68, ALT 93, A1C 5.7. CXR shows clear lungs. He was also noted to be in atrial flutter with rapid ventricular rate to 140s.  Overall new systolic heart failure, class II-he was admitted with moderate hypervolemia and hypertension, but is currently euvolemic and is normotensive s/p diuresis and addition of GDMT.  S/p dig load aflutter rate is better controlled.

## 2024-11-12 NOTE — PROGRESS NOTE ADULT - ATTENDING COMMENTS
69 year old male with no reported PMH presenting with chest pain and SOB, found to have new atrial flutter and HFrEF.    patient seen and examined at bedside in cath lab recovery area. feels like he has some congestion in his chest post-procedure. noted to have O2 desat to 80s overnight- patient states he often snores and feels very tired. agreeable to CPAP tonight and outpatient sleep study.    #New onset atrial flutter  -EP following- s/p GERALDINE/DCCV today, now is in NSR with PACs  -c/w Eliquis  -monitor on telemetry  -started on PO amiodarone load    #HFrEF, likely acute  -unclear chronicity, likely related to tachyarrhythmia  -TTE reviewed with EF of 26%, biventricular failure, severe MR, severely dilated LA  -heart failure team consulted, appreciate recs  -plan for LHC and RHC tomorrow 11/13  -Lasix changed to 40mg PO qd  -started on spironolactone  -monitor strict intake and output    d/w HS 6

## 2024-11-12 NOTE — PROGRESS NOTE ADULT - SUBJECTIVE AND OBJECTIVE BOX
HUYEN POPE ([id number])- Patient is a 69y old  Male who presents with a chief complaint of A flutter with new HFrEF (10 Nov 2024 16:41)      INTERVAL HPI/OVERNIGHT EVENTS: O2 sat 85% started on 2LNC.      SUBJECTIVE:     PHYSICAL EXAM:  - GENERAL: Follows conversation appropriately. No acute distress.  - EYES: Tracks me in room.   - HENT: Moist mucous membranes. No scleral icterus.   - LUNGS: Clear to auscultation bilaterally. No accessory muscle use.  - CARDIOVASCULAR: Regular rate, tachycardic. No murmur.  - ABDOMEN: Soft, non-tender and non-distended. No palpable masses.  - EXTREMITIES: No edema. Non-tender.  - SKIN: No rashes or lesions. Warm.  - NEUROLOGIC: No focal neurological deficits. CN II-XII grossly intact, but not individually tested.  - PSYCHIATRIC: Cooperative. Appropriate mood and affect.    VS  T(C): 36.9 (11-12-24 @ 06:00), Max: 36.9 (11-12-24 @ 06:00)  HR: 110 (11-12-24 @ 06:00) (67 - 153)  BP: 130/77 (11-12-24 @ 06:00) (108/88 - 130/77)  RR: 17 (11-12-24 @ 06:00) (16 - 18)  SpO2: 99% (11-12-24 @ 06:00) (97% - 100%)    LABS (available at time of writing 11-12-24 @ 07:00):                         13.6   9.27  )-----------( 193      ( 12 Nov 2024 05:00 )             42.0     11-12    140  |  101  |  24[H]  ----------------------------<  93  3.4[L]   |  22  |  1.33[H]    Ca    8.9      12 Nov 2024 05:00  Phos  3.9     11-12  Mg     1.70     11-12    TPro  6.1  /  Alb  3.5  /  TBili  1.0  /  DBili  x   /  AST  68[H]  /  ALT  93[H]  /  AlkPhos  104  11-11    PT/INR - ( 12 Nov 2024 05:00 )   PT: 16.8 sec;   INR: 1.45 ratio         PTT - ( 12 Nov 2024 05:00 )  PTT:31.4 sec  Urinalysis Basic - ( 12 Nov 2024 05:00 )    Color: x / Appearance: x / SG: x / pH: x  Gluc: 93 mg/dL / Ketone: x  / Bili: x / Urobili: x   Blood: x / Protein: x / Nitrite: x   Leuk Esterase: x / RBC: x / WBC x   Sq Epi: x / Non Sq Epi: x / Bacteria: x            Medications:   apixaban 5 milliGRAM(s) Oral every 12 hours  furosemide   Injectable 40 milliGRAM(s) IV Push every 8 hours  hydrALAZINE 10 milliGRAM(s) Oral every 8 hours  influenza  Vaccine (HIGH DOSE) 0.5 milliLiter(s) IntraMuscular once  potassium chloride  10 mEq/100 mL IVPB 10 milliEquivalent(s) IV Intermittent once  spironolactone 25 milliGRAM(s) Oral daily      RADIOLOGY, EKG & ADDITIONAL TESTS: Reviewed.      HUYEN POPE ([id number])- Patient is a 69y old  Male who presents with a chief complaint of A flutter with new HFrEF (10 Nov 2024 16:41)      INTERVAL HPI/OVERNIGHT EVENTS: O2 sat 85% started on 2LNC.      SUBJECTIVE: No acute overnight events. Denies fever, chills, chest pain, SOB.     PHYSICAL EXAM:  - GENERAL: Follows conversation appropriately. No acute distress.  - EYES: Tracks me in room.   - HENT: Moist mucous membranes. No scleral icterus.   - LUNGS: Clear to auscultation bilaterally. No accessory muscle use.  - CARDIOVASCULAR: Regular rate, tachycardic. No murmur.  - ABDOMEN: Soft, non-tender and non-distended. No palpable masses.  - EXTREMITIES: No edema. Non-tender.  - SKIN: No rashes or lesions. Warm.  - NEUROLOGIC: No focal neurological deficits. CN II-XII grossly intact, but not individually tested.  - PSYCHIATRIC: Cooperative. Appropriate mood and affect.    VS  T(C): 36.9 (11-12-24 @ 06:00), Max: 36.9 (11-12-24 @ 06:00)  HR: 110 (11-12-24 @ 06:00) (67 - 153)  BP: 130/77 (11-12-24 @ 06:00) (108/88 - 130/77)  RR: 17 (11-12-24 @ 06:00) (16 - 18)  SpO2: 99% (11-12-24 @ 06:00) (97% - 100%)    LABS (available at time of writing 11-12-24 @ 07:00):                         13.6   9.27  )-----------( 193      ( 12 Nov 2024 05:00 )             42.0     11-12    140  |  101  |  24[H]  ----------------------------<  93  3.4[L]   |  22  |  1.33[H]    Ca    8.9      12 Nov 2024 05:00  Phos  3.9     11-12  Mg     1.70     11-12    TPro  6.1  /  Alb  3.5  /  TBili  1.0  /  DBili  x   /  AST  68[H]  /  ALT  93[H]  /  AlkPhos  104  11-11    PT/INR - ( 12 Nov 2024 05:00 )   PT: 16.8 sec;   INR: 1.45 ratio         PTT - ( 12 Nov 2024 05:00 )  PTT:31.4 sec  Urinalysis Basic - ( 12 Nov 2024 05:00 )    Color: x / Appearance: x / SG: x / pH: x  Gluc: 93 mg/dL / Ketone: x  / Bili: x / Urobili: x   Blood: x / Protein: x / Nitrite: x   Leuk Esterase: x / RBC: x / WBC x   Sq Epi: x / Non Sq Epi: x / Bacteria: x            Medications:   apixaban 5 milliGRAM(s) Oral every 12 hours  furosemide   Injectable 40 milliGRAM(s) IV Push every 8 hours  hydrALAZINE 10 milliGRAM(s) Oral every 8 hours  influenza  Vaccine (HIGH DOSE) 0.5 milliLiter(s) IntraMuscular once  potassium chloride  10 mEq/100 mL IVPB 10 milliEquivalent(s) IV Intermittent once  spironolactone 25 milliGRAM(s) Oral daily      RADIOLOGY, EKG & ADDITIONAL TESTS: Reviewed.

## 2024-11-12 NOTE — PROGRESS NOTE ADULT - ASSESSMENT
69 M, no past medical history, presents with intermittent chest pain and SOB x 2 weeks. Patient states he flew home from [place] last night, he had chest pain and SOB on the airplane, which he states "slowly went away" when he got home. Patient states he had similar episodes in the past, he went to his doctor with an unremarkable workup. Patient states he had a "cold" two weeks ago. Endorses  nausea. Denies fever, vomiting, abdominal pain, dizziness, loss of consciousness.    TTE 11/10/24: LVEF 26% severely reduced Left/Right ventricular function.  LA severely dilated. Severe MR. Severe TR. IVC dilated     1) New Onset Atrial Flutter  2) Acute HFrEF/ Biventricular Failure tachycardia mediate    Recommendations:  - Continuous telemetric monitoring, past 24hrs reviewed: Atrial Flutter 2:1 @ 140s, asymptomatic   - TTE reviewed recommend repeat once arrythmia controlled, can be done as outpatient  - Monitor electrolytes and replete K to 4 and Mg to 2  - daily weight, monitor strict Intake and Output  - TSH: 0.72  - Continue care per primary team, GDMT as per cardiology team  - JVD improved this AM, laying flat without supplemental O2 or c/o SOB  - Continue Eliquis   - Plan for  GERALDINE/DCCV today - keep NPO   69 M, no past medical history, presents with intermittent chest pain and SOB x 2 weeks. Patient states he flew home from [place] last night, he had chest pain and SOB on the airplane, which he states "slowly went away" when he got home. Patient states he had similar episodes in the past, he went to his doctor with an unremarkable workup. Patient states he had a "cold" two weeks ago. Endorses  nausea. Denies fever, vomiting, abdominal pain, dizziness, loss of consciousness.    TTE 11/10/24: LVEF 26% severely reduced Left/Right ventricular function.  LA severely dilated. Severe MR. Severe TR. IVC dilated     1) New Onset Atrial Flutter  2) Acute HFrEF/ Biventricular Failure tachycardia mediate    Recommendations:  - Continuous telemetric monitoring, past 24hrs reviewed: Atrial Flutter 2:1 @ 140s, asymptomatic   - TTE reviewed recommend repeat once arrythmia controlled, can be done as outpatient  - Monitor electrolytes and replete K to 4 and Mg to 2  - daily weight, monitor strict Intake and Output  - TSH: 0.72  - Continue care per primary team, GDMT as per cardiology team  - JVD improved this AM, laying flat without supplemental O2 or c/o SOB  - Continue Eliquis   - Plan for  GERALDINE/DCCV today - keep NPO    ADDEN:  S/p GERALDINE/DCCV conversion to NSR with APCs  Start amiodarone PO load

## 2024-11-12 NOTE — PROGRESS NOTE ADULT - PROBLEM SELECTOR PLAN 1
- Pt with atrial flutter in 2:1 block  - Likely been going on for months to years based on history  - EP following. Heparin drip ordered, along with lopressor 25mg TID with hold parameters  - Likely will need ablation vs GERALDINE with cardioversion    - Check TSH (0.72), free T4 1.3  - Monitor on tele. VS q4hrs. Replete lytes as needed  - Cardiology onboard, appreciate recs        > TTE w DCCV planned today  - Cw heparin - Pt with atrial flutter in 2:1 block  - Likely been going on for months to years based on history  - EP following. Heparin drip ordered, along with lopressor 25mg TID with hold parameters  - Likely will need ablation vs GERALDINE with cardioversion    - Check TSH (0.72), free T4 1.3  - Monitor on tele. VS q4hrs. Replete lytes as needed  - Cardiology onboard, appreciate recs        > TTE w DCCV planned today  - Transitioned from heparin to eliquis - Pt with atrial flutter in 2:1 block  - Likely been going on for months to years based on history  - EP following. Heparin drip ordered, along with lopressor 25mg TID with hold parameters  - Likely will need ablation vs GERALDINE with cardioversion    - Check TSH (0.72), free T4 1.3  - Monitor on tele. Replete lytes as needed  - Rate improved on digoxin  - Cardiology onboard, appreciate recs        > TTE w DCCV planned today  - Transitioned from heparin to eliquis

## 2024-11-12 NOTE — PROGRESS NOTE ADULT - PROBLEM SELECTOR PLAN 3
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

## 2024-11-13 ENCOUNTER — TRANSCRIPTION ENCOUNTER (OUTPATIENT)
Age: 69
End: 2024-11-13

## 2024-11-13 PROBLEM — Z78.9 OTHER SPECIFIED HEALTH STATUS: Chronic | Status: ACTIVE | Noted: 2024-11-10

## 2024-11-13 LAB
ANION GAP SERPL CALC-SCNC: 12 MMOL/L — SIGNIFICANT CHANGE UP (ref 7–14)
APTT BLD: 32.9 SEC — SIGNIFICANT CHANGE UP (ref 24.5–35.6)
BUN SERPL-MCNC: 25 MG/DL — HIGH (ref 7–23)
CALCIUM SERPL-MCNC: 8.7 MG/DL — SIGNIFICANT CHANGE UP (ref 8.4–10.5)
CHLORIDE SERPL-SCNC: 102 MMOL/L — SIGNIFICANT CHANGE UP (ref 98–107)
CO2 SERPL-SCNC: 24 MMOL/L — SIGNIFICANT CHANGE UP (ref 22–31)
CREAT SERPL-MCNC: 1.18 MG/DL — SIGNIFICANT CHANGE UP (ref 0.5–1.3)
EGFR: 66 ML/MIN/1.73M2 — SIGNIFICANT CHANGE UP
GLUCOSE SERPL-MCNC: 93 MG/DL — SIGNIFICANT CHANGE UP (ref 70–99)
HCT VFR BLD CALC: 43.6 % — SIGNIFICANT CHANGE UP (ref 39–50)
HGB BLD-MCNC: 14.2 G/DL — SIGNIFICANT CHANGE UP (ref 13–17)
INR BLD: 1.67 RATIO — HIGH (ref 0.85–1.16)
MAGNESIUM SERPL-MCNC: 1.8 MG/DL — SIGNIFICANT CHANGE UP (ref 1.6–2.6)
MCHC RBC-ENTMCNC: 27.4 PG — SIGNIFICANT CHANGE UP (ref 27–34)
MCHC RBC-ENTMCNC: 32.6 G/DL — SIGNIFICANT CHANGE UP (ref 32–36)
MCV RBC AUTO: 84 FL — SIGNIFICANT CHANGE UP (ref 80–100)
NRBC # BLD: 0 /100 WBCS — SIGNIFICANT CHANGE UP (ref 0–0)
NRBC # FLD: 0 K/UL — SIGNIFICANT CHANGE UP (ref 0–0)
PHOSPHATE SERPL-MCNC: 4.2 MG/DL — SIGNIFICANT CHANGE UP (ref 2.5–4.5)
PLATELET # BLD AUTO: 202 K/UL — SIGNIFICANT CHANGE UP (ref 150–400)
POTASSIUM SERPL-MCNC: 3.6 MMOL/L — SIGNIFICANT CHANGE UP (ref 3.5–5.3)
POTASSIUM SERPL-SCNC: 3.6 MMOL/L — SIGNIFICANT CHANGE UP (ref 3.5–5.3)
PROTHROM AB SERPL-ACNC: 19.8 SEC — HIGH (ref 9.9–13.4)
RBC # BLD: 5.19 M/UL — SIGNIFICANT CHANGE UP (ref 4.2–5.8)
RBC # FLD: 13.2 % — SIGNIFICANT CHANGE UP (ref 10.3–14.5)
SODIUM SERPL-SCNC: 138 MMOL/L — SIGNIFICANT CHANGE UP (ref 135–145)
WBC # BLD: 7.79 K/UL — SIGNIFICANT CHANGE UP (ref 3.8–10.5)
WBC # FLD AUTO: 7.79 K/UL — SIGNIFICANT CHANGE UP (ref 3.8–10.5)

## 2024-11-13 PROCEDURE — 99233 SBSQ HOSP IP/OBS HIGH 50: CPT

## 2024-11-13 PROCEDURE — 99231 SBSQ HOSP IP/OBS SF/LOW 25: CPT

## 2024-11-13 PROCEDURE — 99233 SBSQ HOSP IP/OBS HIGH 50: CPT | Mod: GC

## 2024-11-13 RX ORDER — POTASSIUM CHLORIDE 10 MEQ
40 TABLET, EXTENDED RELEASE ORAL ONCE
Refills: 0 | Status: COMPLETED | OUTPATIENT
Start: 2024-11-13 | End: 2024-11-13

## 2024-11-13 RX ORDER — DAPAGLIFLOZIN 10 MG/1
10 TABLET, FILM COATED ORAL DAILY
Refills: 0 | Status: DISCONTINUED | OUTPATIENT
Start: 2024-11-13 | End: 2024-11-13

## 2024-11-13 RX ORDER — SACUBITRIL AND VALSARTAN 97; 103 MG/1; MG/1
1 TABLET, FILM COATED ORAL
Qty: 60 | Refills: 0
Start: 2024-11-13 | End: 2024-12-12

## 2024-11-13 RX ORDER — DAPAGLIFLOZIN 10 MG/1
10 TABLET, FILM COATED ORAL DAILY
Refills: 0 | Status: DISCONTINUED | OUTPATIENT
Start: 2024-11-13 | End: 2024-11-14

## 2024-11-13 RX ORDER — SACUBITRIL AND VALSARTAN 97; 103 MG/1; MG/1
1 TABLET, FILM COATED ORAL
Refills: 0 | Status: DISCONTINUED | OUTPATIENT
Start: 2024-11-13 | End: 2024-11-14

## 2024-11-13 RX ORDER — DAPAGLIFLOZIN 10 MG/1
1 TABLET, FILM COATED ORAL
Qty: 30 | Refills: 0
Start: 2024-11-13 | End: 2024-12-12

## 2024-11-13 RX ORDER — APIXABAN 5 MG/1
1 TABLET, FILM COATED ORAL
Qty: 60 | Refills: 0
Start: 2024-11-13 | End: 2024-12-12

## 2024-11-13 RX ORDER — FUROSEMIDE 40 MG
1 TABLET ORAL
Qty: 30 | Refills: 0
Start: 2024-11-13 | End: 2024-12-12

## 2024-11-13 RX ORDER — HYDRALAZINE HYDROCHLORIDE 50 MG/1
1 TABLET, FILM COATED ORAL
Qty: 90 | Refills: 0
Start: 2024-11-13 | End: 2024-12-12

## 2024-11-13 RX ORDER — APIXABAN 5 MG/1
5 TABLET, FILM COATED ORAL ONCE
Refills: 0 | Status: COMPLETED | OUTPATIENT
Start: 2024-11-13 | End: 2024-11-13

## 2024-11-13 RX ORDER — AMIODARONE HCL 200 MG
1 TABLET ORAL
Qty: 48 | Refills: 0
Start: 2024-11-13 | End: 2024-12-12

## 2024-11-13 RX ORDER — MAGNESIUM SULFATE IN 0.9% NACL 2 G/50 ML
1 INTRAVENOUS SOLUTION, PIGGYBACK (ML) INTRAVENOUS ONCE
Refills: 0 | Status: COMPLETED | OUTPATIENT
Start: 2024-11-13 | End: 2024-11-13

## 2024-11-13 RX ORDER — METOPROLOL TARTRATE 50 MG
0.5 TABLET ORAL
Qty: 30 | Refills: 0
Start: 2024-11-13 | End: 2024-12-12

## 2024-11-13 RX ORDER — SPIRONOLACTONE 100 MG
1 TABLET ORAL
Qty: 30 | Refills: 0
Start: 2024-11-13 | End: 2024-12-12

## 2024-11-13 RX ADMIN — Medication 12.5 MILLIGRAM(S): at 06:03

## 2024-11-13 RX ADMIN — DAPAGLIFLOZIN 10 MILLIGRAM(S): 10 TABLET, FILM COATED ORAL at 15:02

## 2024-11-13 RX ADMIN — Medication 100 GRAM(S): at 08:14

## 2024-11-13 RX ADMIN — CHLORHEXIDINE GLUCONATE 1 APPLICATION(S): 40 SOLUTION TOPICAL at 11:14

## 2024-11-13 RX ADMIN — Medication 40 MILLIGRAM(S): at 06:04

## 2024-11-13 RX ADMIN — Medication 40 MILLIEQUIVALENT(S): at 08:17

## 2024-11-13 RX ADMIN — Medication 400 MILLIGRAM(S): at 01:18

## 2024-11-13 RX ADMIN — Medication 400 MILLIGRAM(S): at 15:37

## 2024-11-13 RX ADMIN — Medication 12.5 MILLIGRAM(S): at 17:49

## 2024-11-13 RX ADMIN — Medication 400 MILLIGRAM(S): at 21:44

## 2024-11-13 RX ADMIN — Medication 25 MILLIGRAM(S): at 06:02

## 2024-11-13 RX ADMIN — APIXABAN 5 MILLIGRAM(S): 5 TABLET, FILM COATED ORAL at 10:12

## 2024-11-13 RX ADMIN — APIXABAN 5 MILLIGRAM(S): 5 TABLET, FILM COATED ORAL at 17:50

## 2024-11-13 RX ADMIN — SACUBITRIL AND VALSARTAN 1 TABLET(S): 97; 103 TABLET, FILM COATED ORAL at 17:52

## 2024-11-13 RX ADMIN — HYDRALAZINE HYDROCHLORIDE 10 MILLIGRAM(S): 50 TABLET, FILM COATED ORAL at 06:03

## 2024-11-13 NOTE — PROGRESS NOTE ADULT - SUBJECTIVE AND OBJECTIVE BOX
HUYEN POPE ([id number])- Patient is a 69y old  Male who presents with a chief complaint of A flutter with new HFrEF (10 Nov 2024 16:41)      INTERVAL HPI/OVERNIGHT EVENTS: No acute overnight events    SUBJECTIVE: Pt fee Denies fever, chills, chest pain, SOB.     PHYSICAL EXAM:  - GENERAL: Follows conversation appropriately. No acute distress.  - EYES: Tracks me in room.   - HENT: Moist mucous membranes. No scleral icterus.   - LUNGS: Clear to auscultation bilaterally. No accessory muscle use.  - CARDIOVASCULAR: Regular rate, tachycardic. No murmur.  - ABDOMEN: Soft, non-tender and non-distended. No palpable masses.  - EXTREMITIES: No edema. Non-tender.  - SKIN: No rashes or lesions. Warm.  - NEUROLOGIC: No focal neurological deficits. CN II-XII grossly intact, but not individually tested.  - PSYCHIATRIC: Cooperative. Appropriate mood and affect.    VS  T(C): 36.6 (11-13-24 @ 05:02), Max: 36.9 (11-12-24 @ 17:36)  HR: 64 (11-13-24 @ 05:02) (64 - 107)  BP: 111/73 (11-13-24 @ 05:02) (111/73 - 125/87)  RR: 18 (11-13-24 @ 05:02) (16 - 18)  SpO2: 95% (11-13-24 @ 05:02) (92% - 99%)    LABS (available at time of writing 11-13-24 @ 07:09):                         14.2   7.79  )-----------( 202      ( 13 Nov 2024 04:10 )             43.6     11-13    138  |  102  |  25[H]  ----------------------------<  93  3.6   |  24  |  1.18    Ca    8.7      13 Nov 2024 04:10  Phos  4.2     11-13  Mg     1.80     11-13      PT/INR - ( 13 Nov 2024 04:10 )   PT: 19.8 sec;   INR: 1.67 ratio         PTT - ( 13 Nov 2024 04:10 )  PTT:32.9 sec  Urinalysis Basic - ( 13 Nov 2024 04:10 )    Color: x / Appearance: x / SG: x / pH: x  Gluc: 93 mg/dL / Ketone: x  / Bili: x / Urobili: x   Blood: x / Protein: x / Nitrite: x   Leuk Esterase: x / RBC: x / WBC x   Sq Epi: x / Non Sq Epi: x / Bacteria: x            Medications:   aMIOdarone    Tablet   Oral   aMIOdarone    Tablet 400 milliGRAM(s) Oral every 8 hours  apixaban 5 milliGRAM(s) Oral every 12 hours  chlorhexidine 2% Cloths 1 Application(s) Topical daily  furosemide    Tablet 40 milliGRAM(s) Oral daily  hydrALAZINE 10 milliGRAM(s) Oral every 8 hours  influenza  Vaccine (HIGH DOSE) 0.5 milliLiter(s) IntraMuscular once  magnesium sulfate  IVPB 1 Gram(s) IV Intermittent once  metoprolol succinate ER 12.5 milliGRAM(s) Oral every 12 hours  potassium chloride    Tablet ER 40 milliEquivalent(s) Oral once  spironolactone 25 milliGRAM(s) Oral daily      RADIOLOGY, EKG & ADDITIONAL TESTS: Reviewed.      HUYEN POPE ([id number])- Patient is a 69y old  Male who presents with a chief complaint of A flutter with new HFrEF (10 Nov 2024 16:41)      INTERVAL HPI/OVERNIGHT EVENTS: No acute overnight events    SUBJECTIVE: Pt feels well this AM. Denies fever, chills, chest pain, SOB.     PHYSICAL EXAM:  - GENERAL: Follows conversation appropriately. No acute distress.  - EYES: Tracks me in room.   - HENT: Moist mucous membranes. No scleral icterus.   - LUNGS: Clear to auscultation bilaterally. No accessory muscle use.  - CARDIOVASCULAR: Regular rate and rhythm  - ABDOMEN: Soft, non-tender and non-distended. No palpable masses.  - EXTREMITIES: No edema. Non-tender.  - SKIN: No rashes or lesions. Warm.  - NEUROLOGIC: No focal neurological deficits. CN II-XII grossly intact, but not individually tested.  - PSYCHIATRIC: Cooperative. Appropriate mood and affect.    VS  T(C): 36.6 (11-13-24 @ 05:02), Max: 36.9 (11-12-24 @ 17:36)  HR: 64 (11-13-24 @ 05:02) (64 - 107)  BP: 111/73 (11-13-24 @ 05:02) (111/73 - 125/87)  RR: 18 (11-13-24 @ 05:02) (16 - 18)  SpO2: 95% (11-13-24 @ 05:02) (92% - 99%)    LABS (available at time of writing 11-13-24 @ 07:09):                         14.2   7.79  )-----------( 202      ( 13 Nov 2024 04:10 )             43.6     11-13    138  |  102  |  25[H]  ----------------------------<  93  3.6   |  24  |  1.18    Ca    8.7      13 Nov 2024 04:10  Phos  4.2     11-13  Mg     1.80     11-13      PT/INR - ( 13 Nov 2024 04:10 )   PT: 19.8 sec;   INR: 1.67 ratio         PTT - ( 13 Nov 2024 04:10 )  PTT:32.9 sec  Urinalysis Basic - ( 13 Nov 2024 04:10 )    Color: x / Appearance: x / SG: x / pH: x  Gluc: 93 mg/dL / Ketone: x  / Bili: x / Urobili: x   Blood: x / Protein: x / Nitrite: x   Leuk Esterase: x / RBC: x / WBC x   Sq Epi: x / Non Sq Epi: x / Bacteria: x            Medications:   aMIOdarone    Tablet   Oral   aMIOdarone    Tablet 400 milliGRAM(s) Oral every 8 hours  apixaban 5 milliGRAM(s) Oral every 12 hours  chlorhexidine 2% Cloths 1 Application(s) Topical daily  furosemide    Tablet 40 milliGRAM(s) Oral daily  hydrALAZINE 10 milliGRAM(s) Oral every 8 hours  influenza  Vaccine (HIGH DOSE) 0.5 milliLiter(s) IntraMuscular once  magnesium sulfate  IVPB 1 Gram(s) IV Intermittent once  metoprolol succinate ER 12.5 milliGRAM(s) Oral every 12 hours  potassium chloride    Tablet ER 40 milliEquivalent(s) Oral once  spironolactone 25 milliGRAM(s) Oral daily      RADIOLOGY, EKG & ADDITIONAL TESTS: Reviewed.

## 2024-11-13 NOTE — DISCHARGE NOTE NURSING/CASE MANAGEMENT/SOCIAL WORK - PATIENT PORTAL LINK FT
You can access the FollowMyHealth Patient Portal offered by [facility] by registering at the following website: [email or web address]. By joining [employer]s FollowMyHealth portal, you will also be able to view your health information using other applications (apps) compatible with our system.

## 2024-11-13 NOTE — PROGRESS NOTE ADULT - PROBLEM SELECTOR PLAN 2
- TTE 11/10/14 with biventricular failure. LVEF 26% global hypokinesis  - BNP 1831  - Likely tachy mediated ISO above. Has severely dilated atria  - Clinically without signs or symptoms of volume overload, but large and non-collapsible IVC on TTE  - Intermittently having narrow pulse pressures. At high risk of decompensation/cardiogenic shock    - Continue with lasix IV 20mg BID. Monitor I/O, daily weight  - Check a1c (5.7) and lipids (unremarkable, low HDL)  - HF consult 11/11. Introduce GDMT pending management of atrial flutter       > started on spironolactone 11/11, Metoprolol 11/12  - strict I/Os, 1,200mL fluid restriction

## 2024-11-13 NOTE — PROGRESS NOTE ADULT - ASSESSMENT
69 YOM with no reported medical history coming in with 2 weeks of intermittent chest pain and worsening shortness of breath, found to have atrial flutter with new onset biventricular failure. Severe MR. S/p EGD w DCCV 11/12, now in sinus rhythm w/ PACs

## 2024-11-13 NOTE — PROGRESS NOTE ADULT - PROBLEM SELECTOR PLAN 1
- Pt with atrial flutter in 2:1 block  - Likely been going on for months to years based on history  - EP following. Heparin drip ordered, along with lopressor 25mg TID with hold parameters  - Likely will need ablation vs GERALDINE with cardioversion    - Check TSH (0.72), free T4 1.3  - Monitor on tele. Replete lytes as needed  - Rate improved on digoxin  - Cardiology onboard, appreciate recs        > TTE w DCCV 11/12, now sinus w PACs        > Cardiac cath planned 11/13  - Transitioned from heparin to eliquis - Pt with atrial flutter in 2:1 block  - Likely been going on for months to years based on history  - EP following. Heparin drip ordered, along with lopressor 25mg TID with hold parameters  - Likely will need ablation vs GERALDINE with cardioversion  - Rate and rhythm improved s/p cardioversion     - Check TSH (0.72), free T4 1.3  - Monitor on tele. Replete lytes as needed  - Cardiology onboard, appreciate recs        > TTE w DCCV 11/12, now sinus w PACs        > Cardiac cath planned 11/13  - Transitioned from heparin to eliquis

## 2024-11-13 NOTE — PROGRESS NOTE ADULT - PROBLEM SELECTOR PLAN 2
Euvolemic and is normotensive today.  Continue Toprol 12.5 mg po BID, hold for SBP <90, HR< 60.   Discontinue hydralazine. Start Entresto 24/26 mg po BID. Hold for SBP <90.  Please check with  [employer] if insurance covers.   Start Farxiga 10 mg po daily. Please check with  [employer] if insurance covers.   Please d/c patient with Lasix 40 mg PRN for weight gain more than 2-3 lbs in 24 hrs, LE edema, SOB. Educated patient on this. HF Booklet given.   Continue spironolactone 25 mg daily. Hold for SBP <90.   Will have to do LHC outpatient (will need to clarify with EP when its ok for patient to be off of AC due to DCCV on 11/12/24)  Keep K 4.0-5.0 and mag >2.0. Got KCL/mag supp today.   Daily standing weights and strict I/O.  Will need outpatient sleep study to r/o sleep apnea.  Will give an appointment to follow up in [facility] HF clinic.   Ok to d/c from HF standpoint. Euvolemic and is normotensive today.  Continue Toprol 12.5 mg po BID, hold for SBP <90, HR< 60.   Discontinue hydralazine. Start Entresto 24/26 mg po BID. Hold for SBP <90.  Please check with  [employer] if insurance covers.   Start Farxiga 10 mg po daily. Please check with  [employer] if insurance covers.   Please d/c patient with Lasix 40 mg PRN for weight gain more than 2-3 lbs in 24 hrs, LE edema, SOB. Educated patient on this. HF Booklet given.   Continue spironolactone 25 mg daily. Hold for SBP <90.   Will have to do LHC outpatient (will need to clarify with EP when its ok for patient to be off of AC due to DCCV on 11/12/24)  Keep K 4.0-5.0 and mag >2.0. Got KCL/mag supp today.   Daily standing weights and strict I/O.  Will need outpatient sleep study to r/o sleep apnea.  Follow up HF clinic 11/22 at 8Am. With GIFTY Yanez.   Ok to d/c from HF standpoint.

## 2024-11-13 NOTE — DISCHARGE NOTE NURSING/CASE MANAGEMENT/SOCIAL WORK - NSDCPEFALRISK_GEN_ALL_CORE
For information on Fall & Injury Prevention, visit: [email or web address] OR  [email or web address] OR  https://www.cdc.gov/steadi/patient.html

## 2024-11-13 NOTE — DISCHARGE NOTE NURSING/CASE MANAGEMENT/SOCIAL WORK - NSDCFUADDAPPT_GEN_ALL_CORE_FT
APPTS ARE READY TO BE MADE: [X] YES    Best Family or Patient Contact (if needed):    Additional Information about above appointments (if needed):    1: Pulmonology and Sleep medicine for evaluation of likely sleep apnea  2:   3:     Other comments or requests:

## 2024-11-13 NOTE — DISCHARGE NOTE NURSING/CASE MANAGEMENT/SOCIAL WORK - FINANCIAL ASSISTANCE
[facility] provides services at a reduced cost to those who are determined to be eligible through [facility]’s financial assistance program. Information regarding [facility]’s financial assistance program can be found by going to [email or web address] or by calling [phone].

## 2024-11-13 NOTE — PROGRESS NOTE ADULT - PROBLEM SELECTOR PLAN 4
- Episode of hypoxia (85%) overnight 11/11  - Pt reports increased day time sleepiness and decreased sleep latency  - Son reports witnessed apneic episode    Plan:  - Sleep study o/p  - CPAP

## 2024-11-13 NOTE — PROGRESS NOTE ADULT - PROBLEM SELECTOR PLAN 5
- mild transaminitis on labs in ED  - Hep C negative on prior admission  - Likely iso hepatic congestion    Plan:  > trend LFTs  > fu lipid panel - WNL  > Liver US if LFTs do not improve

## 2024-11-13 NOTE — PROGRESS NOTE ADULT - NS ATTEND AMEND GEN_ALL_CORE FT
Called patient to discuss had to leave a message.    Spoke with the patient she is feeling well. Sputum more in morning and evening but only yellow in am.  Will hold off on antibiotics.  Will order a CT scan of chest to evaluate for bronchiectasis as she has had multilobar pneumonia In past and has had numerous positive sputums for bacteria.     
Sputum with sensitive Pseudomonas.   And a Strep B.      
69M w/newly diagnosed cardiomyopathy in setting of newly diagnosed flutter. S/P cardioversion to sinus. Start amiodarone for maintenance of sinus rhythm. C/W Eliquis for stroke prophylaxis.
69M w/AFL & newly diagnosed cardiomyopathy s/p DCCV. C/w amiodarone load & Eliquis. F/U as an outpatient with me to discuss long term maintenance of sinus rhythm.
69M no sign PMH, p/w ADHF and atrial flutter w RVR with severe biventricular dysfunction, severe MR, and severe TR.  Several potential causes identified, including hypertensive heart disease, CAD (reports frequent angina recently), untreated tachycardia, previous Covid infection (reports feeling unwell ever since), and JUANITA (snores and has nocturnal hypersomnolence).    Now feeling better with diuresis and HR control with Dig.  Going for GERALDINE/DCCV today.    -Would start Toprol 12.5 mg po bid after procedure.  -Should get L/RHC on this admission.  -Continue hydralazine for now, but likely can transition to Entresto prior to discharge.
Mr. Lugo is a 70 y/o male from [place] no known past medical history who presented on 11/10 with intermittent chest pain and SOB x 2 weeks. Per patient, had been well until Covid in 2020 after which he reports having some fatigue. Reports this past September had worsening SOB and was scheduled to see cardiologist in November but symptoms worsened that he came to ER. On arrival, VS noted , /110, 99% RA. Found to be in aflutter. An echo was obtained which showed severe biventricular dysfunction with LVEF of 15-20%, LVIDD 4.8cm, moderate to severe MR, LVOT VTI 7. Was placed on lopressor and lasix. Underwent GERALDINE/DCCV successfully. Started on amio load. Feels well. On exam, NAD, JVP mildly elevated, RRR, CTAB, nontender abdomen, no edema. Labs reviewed - BUN/Cr 25/1.18, K 3.6, albumin 3.5, BNP 1831. CXR shows clear lungs. TTE reviewed 11/10/24 - EF 20-25%, LVEDD 4.8 cm, severe LAE, mod MR, RV dysfunction, LVOT VTI 7 cm, IVC 3 cm. GERALDINE - severe LV dysfunction, severe biatrial enlargement, mod MR. ECG - flutter, nl axis. Overall stage C HF, NYHA class III with volume overload with severe biventricular dysfunction in setting of aflutter now euvolemic and in sinus.   - c/w toprol 12.5 mg twice/day  - d/c hydral; start Entresto 24/26 twice/day  - start Farxiga 10 mg daily   - c/w rajendra 25 mg daily   - c/w Eliquis 5 mg twice/day; c/w amio load  - make lasix prn for weight gain   - defer R/LHC given DCCV and unable to hold AC for 30 days  - d/w patient disease process; stable for d/c with close f/u

## 2024-11-13 NOTE — PROGRESS NOTE ADULT - SUBJECTIVE AND OBJECTIVE BOX
Patient seen and examined. He is resting comfortably in bed. Denies CP, palpitations, dizziness, PORRAS, orthopnea, PND.   His wife was present bedside, and all questions answered.   HF booklet given.       Medications:  aMIOdarone    Tablet 400 milliGRAM(s) Oral every 8 hours  aMIOdarone    Tablet   Oral   apixaban 5 milliGRAM(s) Oral every 12 hours  chlorhexidine 2% Cloths 1 Application(s) Topical daily  furosemide    Tablet 40 milliGRAM(s) Oral daily  hydrALAZINE 10 milliGRAM(s) Oral every 8 hours  influenza  Vaccine (HIGH DOSE) 0.5 milliLiter(s) IntraMuscular once  metoprolol succinate ER 12.5 milliGRAM(s) Oral every 12 hours  spironolactone 25 milliGRAM(s) Oral daily      Vitals:  Vital Signs Last 24 Hrs  T(C): 36.3 (13 Nov 2024 08:55), Max: 36.9 (12 Nov 2024 17:36)  T(F): 97.4 (13 Nov 2024 08:55), Max: 98.5 (12 Nov 2024 17:36)  HR: 62 (13 Nov 2024 10:59) (62 - 97)  BP: 109/57 (13 Nov 2024 08:55) (109/57 - 125/87)  BP(mean): --  RR: 18 (13 Nov 2024 08:55) (16 - 18)  SpO2: 92% (13 Nov 2024 10:59) (92% - 99%)    Parameters below as of 13 Nov 2024 05:02  Patient On (Oxygen Delivery Method): BiPAP/CPAP        Daily     Daily     I&O's Detail      Physical Exam:     General: No distress. Comfortable.  HEENT: EOM intact.  Neck: Neck supple. JVP normal 6cm.  No masses  Chest: Clear to auscultation bilaterally  CV: Normal S1 and S2. No murmurs, rub, or gallops. Radial pulses normal. No LE edema and is warm b/l.   Abdomen: Soft, non-distended, non-tender  Skin: No rashes or skin breakdown  Neurology: Alert and oriented times three. Sensation intact  Psych: Affect normal    Labs:                        14.2   7.79  )-----------( 202      ( 13 Nov 2024 04:10 )             43.6     11-13    138  |  102  |  25[H]  ----------------------------<  93  3.6   |  24  |  1.18    Ca    8.7      13 Nov 2024 04:10  Phos  4.2     11-13  Mg     1.80     11-13      PT/INR - ( 13 Nov 2024 04:10 )   PT: 19.8 sec;   INR: 1.67 ratio         PTT - ( 13 Nov 2024 04:10 )  PTT:32.9 sec

## 2024-11-13 NOTE — PROGRESS NOTE ADULT - SUBJECTIVE AND OBJECTIVE BOX
Patient is a 69y old  Male who presents with a chief complaint of A flutter with new HFrEF (13 Nov 2024 07:07)    Patient denies CP, SOB or palpitations.  Maintained in SR since DCCV yesterday. Tolerating Amio load w/o adverse effect.    PAST MEDICAL & SURGICAL HISTORY:  No pertinent past medical history    No significant past surgical history        MEDICATIONS  (STANDING):  aMIOdarone    Tablet   Oral   aMIOdarone    Tablet 400 milliGRAM(s) Oral every 8 hours  apixaban 5 milliGRAM(s) Oral every 12 hours  chlorhexidine 2% Cloths 1 Application(s) Topical daily  furosemide    Tablet 40 milliGRAM(s) Oral daily  hydrALAZINE 10 milliGRAM(s) Oral every 8 hours  influenza  Vaccine (HIGH DOSE) 0.5 milliLiter(s) IntraMuscular once  metoprolol succinate ER 12.5 milliGRAM(s) Oral every 12 hours  spironolactone 25 milliGRAM(s) Oral daily    MEDICATIONS  (PRN):            Vital Signs Last 24 Hrs  T(C): 36.3 (13 Nov 2024 08:55), Max: 36.9 (12 Nov 2024 17:36)  T(F): 97.4 (13 Nov 2024 08:55), Max: 98.5 (12 Nov 2024 17:36)  HR: 62 (13 Nov 2024 10:59) (62 - 100)  BP: 109/57 (13 Nov 2024 08:55) (109/57 - 125/87)  BP(mean): --  RR: 18 (13 Nov 2024 08:55) (16 - 18)  SpO2: 92% (13 Nov 2024 10:59) (92% - 99%)    Parameters below as of 13 Nov 2024 05:02  Patient On (Oxygen Delivery Method): BiPAP/CPAP                INTERPRETATION OF TELEMETRY: SR 50-70's with occasional PVC's APC's, no recurrent PAFL seen     ECG:        LABS:                        14.2   7.79  )-----------( 202      ( 13 Nov 2024 04:10 )             43.6     11-13    138  |  102  |  25[H]  ----------------------------<  93  3.6   |  24  |  1.18    Ca    8.7      13 Nov 2024 04:10  Phos  4.2     11-13  Mg     1.80     11-13          PT/INR - ( 13 Nov 2024 04:10 )   PT: 19.8 sec;   INR: 1.67 ratio         PTT - ( 13 Nov 2024 04:10 )  PTT:32.9 sec  Urinalysis Basic - ( 13 Nov 2024 04:10 )    Color: x / Appearance: x / SG: x / pH: x  Gluc: 93 mg/dL / Ketone: x  / Bili: x / Urobili: x   Blood: x / Protein: x / Nitrite: x   Leuk Esterase: x / RBC: x / WBC x   Sq Epi: x / Non Sq Epi: x / Bacteria: x        BNP  RADIOLOGY & ADDITIONAL STUDIES:    CONCLUSIONS:      1. Left ventricular systolic function is severely decreased. Global left ventricular hypokinesis.   2. Enlarged right ventricular cavity size and reduced right ventricular systolic function.   3. There is mitral valve thickening of the anterior and posterior leaflets. There is moderate mitral regurgitation. Vena contracta width ~ 0.4-0.5 cm. Estimated effective regurgitant orifice area (EROA) ~ 0.34 cm2 (by the PISA method).   4. The aortic valve appears trileaflet with normal systolic excursion. There is calcification of the aortic valve leaflets. There is no evidence of aortic regurgitation.   5. No atheroma in the visualized portions of the proximal ascending aorta. No atheroma in the visualized portions of the transverse aortic arch. No atheroma in the visualized portions of the descending aorta.   6. The left atrium is dilated. There is no evidence of left atrial or left atrial appendage thrombus.   7. Agitated saline injection was negative for intracardiac shunt.        PHYSICAL EXAM:    GENERAL: In no apparent distress, well nourished, and hydrated.  NECK: Supple and normal thyroid.  No JVD or carotid bruit.  Carotid pulse is 2+ bilaterally.  HEART: Regular rate and rhythm; No murmurs, rubs, or gallops.  PULMONARY: Clear to auscultation and perfusion.  No rales, wheezing, or rhonchi bilaterally.  ABDOMEN: Soft, Nontender, Nondistended; Bowel sounds present  EXTREMITIES:  2+ Peripheral Pulses, No clubbing, cyanosis, or edema  NEUROLOGICAL: alert oriented x4 speech clear no focal deficit noted

## 2024-11-13 NOTE — PROGRESS NOTE ADULT - ASSESSMENT
69 M, no past medical history, presents with intermittent chest pain and SOB x 2 weeks. Patient states he flew home from [place] last night, he had chest pain and SOB on the airplane, which he states "slowly went away" when he got home. Patient states he had similar episodes in the past, he went to his doctor with an unremarkable workup. Patient states he had a "cold" two weeks ago. Endorses  nausea. Denies fever, vomiting, abdominal pain, dizziness, loss of consciousness.    TTE 11/10/24: LVEF 26% severely reduced Left/Right ventricular function.  LA severely dilated. Severe MR. Severe TR. IVC dilated     1) New Onset paroxysmal Atrial Flutter s/p successful DCCV to SR on 11/12  2) Acute HFrEF/ Biventricular Failure tachycardia mediate    Recommendations:      - Monitor electrolytes and replete K to 4 and Mg to   - Continue GDMT as per HF team  - Continue Eliquis 5mg bid for AC  -Will provide a follow up with EP for AFL ablation as a long term goal  Continue amiodarone PO load, Amiodarone teaching with instructions provided.  Routine LFT/TFT's Q 3-6 months and yearly PFT with CXR and opthalmology exam advised.   Patient stated understanding.     69 M, no past medical history, presents with intermittent chest pain and SOB x 2 weeks. Patient states he flew home from [place] last night, he had chest pain and SOB on the airplane, which he states "slowly went away" when he got home. Patient states he had similar episodes in the past, he went to his doctor with an unremarkable workup. Patient states he had a "cold" two weeks ago. Endorses  nausea. Denies fever, vomiting, abdominal pain, dizziness, loss of consciousness.    TTE 11/10/24: LVEF 26% severely reduced Left/Right ventricular function.  LA severely dilated. Severe MR. Severe TR. IVC dilated     1) New Onset paroxysmal Atrial Flutter s/p successful DCCV to SR on 11/12  2) Acute HFrEF/ Biventricular Failure tachycardia mediate    Recommendations:      - Monitor electrolytes and replete K to 4 and Mg to   - Continue GDMT as per HF team, uptirate Metoprolol ER 25mg bid if contraindication from HF  - Continue Eliquis 5mg bid for AC  -follow up with Dr. Quintanilla on Dec 5 at 11:30 am for AFL ablation as a long term goal  Continue amiodarone PO load, Amiodarone teaching with instructions provided.  Routine LFT/TFT's Q 3-6 months and yearly PFT with CXR and opthalmology exam advised.   Patient stated understanding.

## 2024-11-13 NOTE — PROGRESS NOTE ADULT - ATTENDING COMMENTS
69 year old male with no reported PMH presenting with chest pain and SOB, found to have new atrial flutter and HFrEF.    patient seen and examined at bedside. patient's family at bedside. patient remains in NSR. pt denies chest discomfort or palpitations. discussed findings of O2 desaturation overnight while off CPAP. patient wishes to stay inpatient for overnight O2 pulse ox to qualify for home O2/CPAP.    #New onset atrial flutter  -EP following- s/p GERALDINE/DCCV 11/12, now is in NSR with PACs  -c/w Eliquis  -monitor on telemetry  -started on PO amiodarone load    #HFrEF, likely acute  -unclear chronicity, likely related to tachyarrhythmia  -TTE reviewed with EF of 26%, biventricular failure, severe MR, severely dilated LA  -heart failure team consulted, appreciate recs  -plan for LHC and RHC outpatient once patient can remain off Eliquis  -Lasix changed to 40mg PO qd- will change to PRN on discharge  -c/w spironolactone  -started Entresto and Farxiga- check pricing with pt's insurance  -monitor strict intake and output    #JUANITA  -longstanding history of snoring and apneic episodes at night  -started on CPAP qHS inpatient  -will check overnight pulse oximetry to see if patient qualifies for home oxygen  -needs outpatient sleep study for CPAP/titration- pulmonary team emailed for appt    d/w HS 6

## 2024-11-13 NOTE — PROGRESS NOTE ADULT - ASSESSMENT
70 y/o male from [place], not previously followed by a PMD, with no known past medical history, presents with intermittent chest pain and SOB x 2 weeks. Overall he states he has noticed that he 'did not feel like superman" ever since he had COVID19 in 2020. He admits to fatigue and SOB ever since then. Lately he has experienced worsening SOB and decided to come to ED. TTE on 11/10/24 shows new diagnosis of HFrEF with biventricular dysfunction with LVEF of 15-20%, LVIDD 4.8cm, moderate to severe MR, VTI 7.   Labs show proBNP 1831, BUN/Cr 25/1.31, AST 68, ALT 93, A1C 5.7. CXR shows clear lungs. He was also noted to be in atrial flutter with rapid ventricular rate to 140s.  Overall new systolic heart failure, class II-he was admitted with moderate hypervolemia and hypertension, but is currently euvolemic and is normotensive s/p diuresis and addition of GDMT.  S/p dig load aflutter rate is better controlled.

## 2024-11-14 VITALS
SYSTOLIC BLOOD PRESSURE: 119 MMHG | OXYGEN SATURATION: 98 % | RESPIRATION RATE: 18 BRPM | HEART RATE: 60 BPM | DIASTOLIC BLOOD PRESSURE: 54 MMHG | TEMPERATURE: 98 F

## 2024-11-14 PROCEDURE — 99239 HOSP IP/OBS DSCHRG MGMT >30: CPT | Mod: GC

## 2024-11-14 RX ADMIN — Medication 25 MILLIGRAM(S): at 07:01

## 2024-11-14 RX ADMIN — SACUBITRIL AND VALSARTAN 1 TABLET(S): 97; 103 TABLET, FILM COATED ORAL at 08:28

## 2024-11-14 RX ADMIN — Medication 40 MILLIGRAM(S): at 07:01

## 2024-11-14 RX ADMIN — Medication 400 MILLIGRAM(S): at 07:00

## 2024-11-14 RX ADMIN — DAPAGLIFLOZIN 10 MILLIGRAM(S): 10 TABLET, FILM COATED ORAL at 12:55

## 2024-11-14 RX ADMIN — CHLORHEXIDINE GLUCONATE 1 APPLICATION(S): 40 SOLUTION TOPICAL at 12:55

## 2024-11-14 RX ADMIN — APIXABAN 5 MILLIGRAM(S): 5 TABLET, FILM COATED ORAL at 07:00

## 2024-11-14 NOTE — PROGRESS NOTE ADULT - TIME BILLING
- Review of chart and consultation notes  - Exam and discussion with patient  - Review of laboratory and imaging   - Establishing a care plan for patient  - Communication with other providers
- Review of chart and consultation notes  - Exam and discussion with patient  - Review of laboratory and imaging   - Establishing a care plan for patient  - Communication with other providers
Patient encounter, including chart review, medication review, patient interview, ordering labs and medications, interpreting labs and imaging results, and coordination of care with consultants
Patient encounter, including chart review, medication review, patient interview, ordering labs and medications, interpreting labs and imaging results, and coordination of care with consultants
discharge
Patient encounter, including chart review, medication review, patient interview, ordering labs and medications, interpreting labs and imaging results, and coordination of care with consultants

## 2024-11-14 NOTE — PROGRESS NOTE ADULT - REASON FOR ADMISSION
A flutter with new HFrEF

## 2024-11-14 NOTE — PROVIDER CONTACT NOTE (OTHER) - ASSESSMENT
Patient stable. No signs of distress noted. Patient sleeping.
Patient AxO4, with complaint of "tongue heaviness" and SOB. Patient denies pain.

## 2024-11-14 NOTE — PROGRESS NOTE ADULT - ASSESSMENT
69 YOM with no reported medical history coming in with 2 weeks of intermittent chest pain and worsening shortness of breath, found to have atrial flutter with new onset biventricular failure. Severe MR. S/p EGD w DCCV 11/12, now in sinus rhythm w/ PACs 69 YOM with no reported medical history coming in with 2 weeks of intermittent chest pain and worsening shortness of breath, found to have atrial flutter with new onset biventricular failure. Severe MR. S/p EGD w DCCV 11/12, now in sinus rhythm w/ PACs. Desats overnight likely iso JUANITA

## 2024-11-14 NOTE — PROGRESS NOTE ADULT - SUBJECTIVE AND OBJECTIVE BOX
HUYEN POPE ([id number])- Patient is a 69y old  Male who presents with a chief complaint of A flutter with new HFrEF (10 Nov 2024 16:41)      INTERVAL HPI/OVERNIGHT EVENTS: No acute overnight events    SUBJECTIVE:     PHYSICAL EXAM:  - GENERAL: Follows conversation appropriately. No acute distress.  - EYES: Tracks me in room.   - HENT: Moist mucous membranes. No scleral icterus.   - LUNGS: Clear to auscultation bilaterally. No accessory muscle use.  - CARDIOVASCULAR: Regular rate and rhythm  - ABDOMEN: Soft, non-tender and non-distended. No palpable masses.  - EXTREMITIES: No edema. Non-tender.  - SKIN: No rashes or lesions. Warm.  - NEUROLOGIC: No focal neurological deficits. CN II-XII grossly intact, but not individually tested.  - PSYCHIATRIC: Cooperative. Appropriate mood and affect.     HUYEN POPE ([id number])- Patient is a 69y old  Male who presents with a chief complaint of A flutter with new HFrEF (10 Nov 2024 16:41)      INTERVAL HPI/OVERNIGHT EVENTS: No acute overnight events. Tele showing sinus rhythm w Hr in 50s-60s. Episodes of desaturation to 79% overnight on room air.    SUBJECTIVE: Pt seen at bedside. Reports waking up last night due to episode of SOB which self resolved. Pt able to ambulate without issue. Denies chest pain, dizziness, SOB.    PHYSICAL EXAM:  - GENERAL: Follows conversation appropriately. No acute distress.  - EYES: Tracks me in room.   - HENT: Moist mucous membranes. No scleral icterus.   - LUNGS: Clear to auscultation bilaterally. No accessory muscle use.  - CARDIOVASCULAR: Regular rate and rhythm, no murmur appreciated  - ABDOMEN: Soft, non-tender and non-distended. No palpable masses.  - EXTREMITIES: No edema. Non-tender.  - SKIN: No rashes or lesions. Warm.  - NEUROLOGIC: No focal neurological deficits. CN II-XII grossly intact, but not individually tested.  - PSYCHIATRIC: Cooperative. Appropriate mood and affect.    VS  T(C): 36.7 (11-14-24 @ 01:00), Max: 36.9 (11-13-24 @ 13:00)  HR: 85 (11-14-24 @ 07:15) (61 - 85)  BP: 102/62 (11-14-24 @ 01:00) (95/58 - 109/57)  RR: 18 (11-14-24 @ 01:00) (17 - 18)  SpO2: 100% (11-14-24 @ 07:15) (92% - 100%)    LABS (available at time of writing 11-14-24 @ 08:35):                         14.2   7.79  )-----------( 202      ( 13 Nov 2024 04:10 )             43.6     11-13    138  |  102  |  25[H]  ----------------------------<  93  3.6   |  24  |  1.18    Ca    8.7      13 Nov 2024 04:10  Phos  4.2     11-13  Mg     1.80     11-13      PT/INR - ( 13 Nov 2024 04:10 )   PT: 19.8 sec;   INR: 1.67 ratio         PTT - ( 13 Nov 2024 04:10 )  PTT:32.9 sec  Urinalysis Basic - ( 13 Nov 2024 04:10 )    Color: x / Appearance: x / SG: x / pH: x  Gluc: 93 mg/dL / Ketone: x  / Bili: x / Urobili: x   Blood: x / Protein: x / Nitrite: x   Leuk Esterase: x / RBC: x / WBC x   Sq Epi: x / Non Sq Epi: x / Bacteria: x            Medications:   aMIOdarone    Tablet 400 milliGRAM(s) Oral every 8 hours  aMIOdarone    Tablet   Oral   apixaban 5 milliGRAM(s) Oral every 12 hours  chlorhexidine 2% Cloths 1 Application(s) Topical daily  dapagliflozin 10 milliGRAM(s) Oral daily  furosemide    Tablet 40 milliGRAM(s) Oral daily  influenza  Vaccine (HIGH DOSE) 0.5 milliLiter(s) IntraMuscular once  metoprolol succinate ER 12.5 milliGRAM(s) Oral every 12 hours  sacubitril 24 mG/valsartan 26 mG 1 Tablet(s) Oral two times a day  spironolactone 25 milliGRAM(s) Oral daily      RADIOLOGY, EKG & ADDITIONAL TESTS: Reviewed.

## 2024-11-14 NOTE — CHART NOTE - NSCHARTNOTEFT_GEN_A_CORE
Telemetry reviewed: SR 50-70 bpm, no PAF seen.  Continue Amio load (can be completed as an outpatient) and AC.  Continue GDMT per HF.   Follow up with EP Dr. Quintanilla on 12/5 at 11:30 am .  Oncology Building 4 th Floor at [facility]  [phone].   EP will sign off.
Discussed with Dr. Laboy, cleared for discharge from HF perspective. Followup appointment  in HF clinic on 11/22 at 8am

## 2024-11-14 NOTE — PROGRESS NOTE ADULT - PROBLEM SELECTOR PLAN 6
Diet: Dash 1200cc/24hr fluid restriction   DVT Proph: Heparin gtt  Dispo: Pending course

## 2024-11-14 NOTE — PROVIDER CONTACT NOTE (OTHER) - ACTION/TREATMENT ORDERED:
Provider made aware. 2L nasal cannula ordered.
Provider aware and spoke to patients about s/s this morning.

## 2024-11-14 NOTE — PROGRESS NOTE ADULT - PROBLEM SELECTOR PLAN 2
- TTE 11/10/14 with biventricular failure. LVEF 26% global hypokinesis  - BNP 1831  - Likely tachy mediated ISO above. Has severely dilated atria  - Clinically without signs or symptoms of volume overload, but large and non-collapsible IVC on TTE  - Intermittently having narrow pulse pressures. At high risk of decompensation/cardiogenic shock    - Continue with lasix IV 20mg BID. Monitor I/O, daily weight  - Check a1c (5.7) and lipids (unremarkable, low HDL)  - HF consult 11/11. Introduce GDMT pending management of atrial flutter       > started on spironolactone 11/11, Metoprolol 11/12  - strict I/Os, 1,200mL fluid restriction - TTE 11/10/14 with biventricular failure. LVEF 26% global hypokinesis  - BNP 1831  - Likely tachy mediated ISO above. Has severely dilated atria  - Clinically without signs or symptoms of volume overload, but large and non-collapsible IVC on TTE  - Intermittently having narrow pulse pressures. At high risk of decompensation/cardiogenic shock    - Continue with lasix IV 20mg BID. Monitor I/O, daily weight  - Check a1c (5.7) and lipids (unremarkable, low HDL)  - HF consult 11/11. Introduce GDMT pending management of atrial flutter       > started on spironolactone 11/11, Metoprolol 11/12, empagliflozin and entresto 11/13       > Hr 50s-60s, SBP 90s-100s, pt asymptomatic  - strict I/Os, 1,200mL fluid restriction - TTE 11/10/14 with biventricular failure. LVEF 26% global hypokinesis  - BNP 1831  - Likely tachy mediated ISO above. Has severely dilated atria  - Clinically without signs or symptoms of volume overload, but large and non-collapsible IVC on TTE  - Intermittently having narrow pulse pressures. At high risk of decompensation/cardiogenic shock    - Monitor I/O, daily weight, weight on DC is 78.4kg  - Check a1c (5.7) and lipids (unremarkable, low HDL)  - HF consult 11/11. Introduce GDMT pending management of atrial flutter       > started on spironolactone 11/11, Metoprolol 11/12, empagliflozin and entresto 11/13       > Hr 50s-60s, SBP 90s-100s, pt asymptomatic  - strict I/Os, 1,200mL fluid restriction

## 2024-11-14 NOTE — PROGRESS NOTE ADULT - ATTENDING COMMENTS
69 year old male with no reported PMH presenting with chest pain and SOB, found to have new atrial flutter and HFrEF.    patient seen and examined at bedside. patient's wife at bedside. overnight pulse oximetry testing not done- however it appears patient did not desaturate as much last night on review of . patient states he feels more tired today than yesterday- was not on CPAP in anticipation for overnight pulse ox testing    #New onset atrial flutter  -EP following- s/p GERALDINE/DCCV 11/12, now is in NSR with PACs  -c/w Eliquis  -monitor on telemetry  -started on PO amiodarone load    #HFrEF, likely acute  -unclear chronicity, likely related to tachyarrhythmia  -TTE reviewed with EF of 26%, biventricular failure, severe MR, severely dilated LA  -heart failure team following, appreciate recs  -plan for LHC and RHC outpatient once patient can remain off Eliquis  -Lasix changed to 40mg PO qd- will change to PRN on discharge  -c/w spironolactone  -started Entresto and Farxiga  -monitor strict intake and output    #JUANITA  -longstanding history of snoring and apneic episodes at night  -started on CPAP qHS inpatient  -overnight pulse oximetry testing not done last night- patient did not have too many episodes of dest on review of tele  -needs outpatient sleep study for CPAP/titration- pulmonary team emailed for appt- has appt on 11/15    d/c home today    d/w HS 6

## 2024-11-14 NOTE — PHARMACOTHERAPY INTERVENTION NOTE - COMMENTS
Discharge medications were reviewed with the patient and his wife at bedside. Current medication schedule was discussed in detail including: medication name, indication, dose, administration times, treatment duration of amiodarone titration, side effects, drug interactions, and special instructions. In particular, patient and wife were counseled on heart failure medication indications, administration, and side effects. Also discussed fluid and salt restrictions, and maintaining a log of daily weights. In addition to this, discussed warning signs of volume overload (SOB, OPRRAS, orthopnea, edema, etc.) and when to seek medical attention. Additionally, Educated patient on apixaban including the purpose and administration. Discussed adverse effects in detail and when to seek medical attention (blood in stool, vomit, etc.). Informed patient to notify all providers he is on this and before any planned procedures. All  questions and concerns were answered and addressed. Patient and wife verbalized understanding and were provided with educational drug cards as well as the heart failure booklet.    Arabella Bro, PharmD, BCPS  Clinical Pharmacy Specialist  Spectra [phone] 
Medication history is complete. Medication list updated in Outpatient Medication Record (OMR). Patient is currently not taking any prescription or OTC medications. Please call spectra [phone] if you have any questions.

## 2024-11-14 NOTE — PROVIDER CONTACT NOTE (OTHER) - BACKGROUND
69 M, no known past medical history, presents with intermittent chest pain and SOB x 2 weeks. Patient had atrial flutter and was treated with cardioversion.
Patient admitted for atrial flutter with PMH of transaminitis, acute systolic congestive heart failure.

## 2024-11-14 NOTE — PROGRESS NOTE ADULT - PROBLEM SELECTOR PLAN 4
- Episode of hypoxia (85%) overnight 11/11  - Pt reports increased day time sleepiness and decreased sleep latency  - Son reports witnessed apneic episode    Plan:  - Sleep study o/p  - CPAP - Episode of hypoxia (85%) overnight 11/11  - Pt reports increased day time sleepiness and decreased sleep latency  - Son reports witnessed apneic episode  - Pt does not qualify for home O2, scheduled for o/p pulm appt for sleep study    Plan:  - Sleep study o/p  - CPAP

## 2024-11-14 NOTE — PROGRESS NOTE ADULT - PROBLEM SELECTOR PROBLEM 2
Acute systolic congestive heart failure

## 2024-11-14 NOTE — PROGRESS NOTE ADULT - PROBLEM SELECTOR PLAN 1
- Pt with atrial flutter in 2:1 block  - Likely been going on for months to years based on history  - EP following. Heparin drip ordered, along with lopressor 25mg TID with hold parameters  - Likely will need ablation vs GERALDINE with cardioversion  - Rate and rhythm improved s/p cardioversion     - Check TSH (0.72), free T4 1.3  - Monitor on tele. Replete lytes as needed  - Cardiology onboard, appreciate recs        > TTE w DCCV 11/12, now sinus w PACs        > Cardiac cath planned 11/13  - Transitioned from heparin to eliquis - Pt with atrial flutter in 2:1 block  - Likely been going on for months to years based on history  - EP following. Heparin drip ordered, along with lopressor 25mg TID with hold parameters  - Likely will need ablation vs GERALDINE with cardioversion  - Rate and rhythm improved s/p cardioversion     - Check TSH (0.72), free T4 1.3  - Monitor on tele. Replete lytes as needed  - Cardiology onboard, appreciate recs        > TTE w DCCV 11/12, now sinus w PACs        > Cardiac cath planned 11/13  - Transitioned from heparin to eliquis  > cw amiodarone

## 2024-11-14 NOTE — PROGRESS NOTE ADULT - PROVIDER SPECIALTY LIST ADULT
Electrophysiology
Electrophysiology
Heart Failure
Electrophysiology
Heart Failure
Internal Medicine

## 2024-11-15 ENCOUNTER — APPOINTMENT (OUTPATIENT)
Dept: PULMONOLOGY | Facility: CLINIC | Age: 69
End: 2024-11-15

## 2024-11-15 PROCEDURE — 99496 TRANSJ CARE MGMT HIGH F2F 7D: CPT

## 2024-11-22 ENCOUNTER — NON-APPOINTMENT (OUTPATIENT)
Age: 69
End: 2024-11-22

## 2024-11-22 ENCOUNTER — APPOINTMENT (OUTPATIENT)
Dept: HEART FAILURE | Facility: CLINIC | Age: 69
End: 2024-11-22
Payer: MEDICARE

## 2024-11-22 ENCOUNTER — APPOINTMENT (OUTPATIENT)
Dept: INTERNAL MEDICINE | Facility: CLINIC | Age: 69
End: 2024-11-22
Payer: MEDICARE

## 2024-11-22 VITALS
DIASTOLIC BLOOD PRESSURE: 68 MMHG | TEMPERATURE: 98 F | OXYGEN SATURATION: 97 % | HEIGHT: 72 IN | SYSTOLIC BLOOD PRESSURE: 104 MMHG | HEART RATE: 57 BPM | BODY MASS INDEX: 24.12 KG/M2 | WEIGHT: 178.06 LBS

## 2024-11-22 VITALS
DIASTOLIC BLOOD PRESSURE: 59 MMHG | WEIGHT: 179 LBS | HEIGHT: 72 IN | HEART RATE: 57 BPM | SYSTOLIC BLOOD PRESSURE: 96 MMHG | OXYGEN SATURATION: 97 % | BODY MASS INDEX: 24.24 KG/M2

## 2024-11-22 VITALS — DIASTOLIC BLOOD PRESSURE: 60 MMHG | SYSTOLIC BLOOD PRESSURE: 90 MMHG

## 2024-11-22 VITALS — SYSTOLIC BLOOD PRESSURE: 94 MMHG | DIASTOLIC BLOOD PRESSURE: 60 MMHG

## 2024-11-22 DIAGNOSIS — Z12.9 ENCOUNTER FOR SCREENING FOR MALIGNANT NEOPLASM, SITE UNSPECIFIED: ICD-10-CM

## 2024-11-22 DIAGNOSIS — E55.9 VITAMIN D DEFICIENCY, UNSPECIFIED: ICD-10-CM

## 2024-11-22 DIAGNOSIS — Z71.85 ENCOUNTER FOR IMMUNIZATION SAFETY COUNSELING: ICD-10-CM

## 2024-11-22 DIAGNOSIS — Z13.228 ENCOUNTER FOR SCREENING FOR OTHER METABOLIC DISORDERS: ICD-10-CM

## 2024-11-22 DIAGNOSIS — Z00.00 ENCOUNTER FOR GENERAL ADULT MEDICAL EXAMINATION W/OUT ABNORMAL FINDINGS: ICD-10-CM

## 2024-11-22 DIAGNOSIS — R42 DIZZINESS AND GIDDINESS: ICD-10-CM

## 2024-11-22 DIAGNOSIS — R06.02 SHORTNESS OF BREATH: ICD-10-CM

## 2024-11-22 DIAGNOSIS — I34.0 NONRHEUMATIC MITRAL (VALVE) INSUFFICIENCY: ICD-10-CM

## 2024-11-22 DIAGNOSIS — N17.9 ACUTE KIDNEY FAILURE, UNSPECIFIED: ICD-10-CM

## 2024-11-22 DIAGNOSIS — R29.818 OTHER SYMPTOMS AND SIGNS INVOLVING THE NERVOUS SYSTEM: ICD-10-CM

## 2024-11-22 LAB
25(OH)D3 SERPL-MCNC: 21.8 NG/ML
ALBUMIN SERPL ELPH-MCNC: 4.3 G/DL
ALP BLD-CCNC: 137 U/L
ALT SERPL-CCNC: 25 U/L
ANION GAP SERPL CALC-SCNC: 15 MMOL/L
APPEARANCE: CLEAR
AST SERPL-CCNC: 18 U/L
BACTERIA: NEGATIVE /HPF
BASOPHILS # BLD AUTO: 0.08 K/UL
BASOPHILS NFR BLD AUTO: 0.7 %
BILIRUB SERPL-MCNC: 0.3 MG/DL
BILIRUBIN URINE: NEGATIVE
BLOOD URINE: NEGATIVE
BUN SERPL-MCNC: 31 MG/DL
CALCIUM SERPL-MCNC: 9.6 MG/DL
CAST: 0 /LPF
CHLORIDE SERPL-SCNC: 101 MMOL/L
CK SERPL-CCNC: 62 U/L
CO2 SERPL-SCNC: 24 MMOL/L
COLOR: YELLOW
CREAT SERPL-MCNC: 1.88 MG/DL
CREAT SPEC-SCNC: 52 MG/DL
EGFR: 38 ML/MIN/1.73M2
EOSINOPHIL # BLD AUTO: 0.15 K/UL
EOSINOPHIL NFR BLD AUTO: 1.4 %
EPITHELIAL CELLS: 0 /HPF
FERRITIN SERPL-MCNC: 127 NG/ML
FOLATE SERPL-MCNC: 12.5 NG/ML
GLUCOSE QUALITATIVE U: 250 MG/DL
GLUCOSE SERPL-MCNC: 103 MG/DL
HCT VFR BLD CALC: 51.9 %
HGB BLD-MCNC: 16.3 G/DL
IMM GRANULOCYTES NFR BLD AUTO: 0.4 %
IRON SATN MFR SERPL: 14 %
IRON SERPL-MCNC: 50 UG/DL
KETONES URINE: NEGATIVE MG/DL
LEUKOCYTE ESTERASE URINE: NEGATIVE
LYMPHOCYTES # BLD AUTO: 2.75 K/UL
LYMPHOCYTES NFR BLD AUTO: 25 %
MAN DIFF?: NORMAL
MCHC RBC-ENTMCNC: 26.5 PG
MCHC RBC-ENTMCNC: 31.4 G/DL
MCV RBC AUTO: 84.5 FL
MICROALBUMIN 24H UR DL<=1MG/L-MCNC: <1.2 MG/DL
MICROALBUMIN/CREAT 24H UR-RTO: NORMAL MG/G
MICROSCOPIC-UA: NORMAL
MONOCYTES # BLD AUTO: 0.92 K/UL
MONOCYTES NFR BLD AUTO: 8.3 %
NEUTROPHILS # BLD AUTO: 7.08 K/UL
NEUTROPHILS NFR BLD AUTO: 64.2 %
NITRITE URINE: NEGATIVE
NT-PROBNP SERPL-MCNC: 151 PG/ML
PH URINE: 5.5
PLATELET # BLD AUTO: 401 K/UL
POTASSIUM SERPL-SCNC: 4.9 MMOL/L
PROT SERPL-MCNC: 7.6 G/DL
PROTEIN URINE: NEGATIVE MG/DL
PSA SERPL-MCNC: 0.94 NG/ML
RBC # BLD: 6.14 M/UL
RBC # FLD: 14.1 %
RED BLOOD CELLS URINE: 0 /HPF
SODIUM SERPL-SCNC: 141 MMOL/L
SPECIFIC GRAVITY URINE: 1.01
T4 FREE SERPL-MCNC: 1.3 NG/DL
TIBC SERPL-MCNC: 359 UG/DL
TSH SERPL-ACNC: 2.31 UIU/ML
UIBC SERPL-MCNC: 309 UG/DL
URATE SERPL-MCNC: 6.5 MG/DL
UROBILINOGEN URINE: 0.2 MG/DL
VIT B12 SERPL-MCNC: 360 PG/ML
WBC # FLD AUTO: 11.02 K/UL
WHITE BLOOD CELLS URINE: 0 /HPF

## 2024-11-22 PROCEDURE — 99214 OFFICE O/P EST MOD 30 MIN: CPT | Mod: 25

## 2024-11-22 PROCEDURE — 99214 OFFICE O/P EST MOD 30 MIN: CPT

## 2024-11-22 PROCEDURE — G2211 COMPLEX E/M VISIT ADD ON: CPT

## 2024-11-22 PROCEDURE — 99397 PER PM REEVAL EST PAT 65+ YR: CPT

## 2024-11-22 PROCEDURE — 93000 ELECTROCARDIOGRAM COMPLETE: CPT

## 2024-11-22 RX ORDER — ERGOCALCIFEROL 1.25 MG/1
1.25 MG CAPSULE, LIQUID FILLED ORAL
Qty: 8 | Refills: 0 | Status: ACTIVE | COMMUNITY
Start: 2024-11-22 | End: 1900-01-01

## 2024-11-22 RX ORDER — HYDRALAZINE HYDROCHLORIDE 10 MG/1
10 TABLET ORAL 3 TIMES DAILY
Qty: 90 | Refills: 2 | Status: ACTIVE | COMMUNITY
Start: 2024-11-22 | End: 1900-01-01

## 2024-11-22 RX ORDER — SPIRONOLACTONE 25 MG/1
25 TABLET ORAL
Qty: 30 | Refills: 3 | Status: ACTIVE | COMMUNITY
Start: 2024-11-22 | End: 1900-01-01

## 2024-11-22 RX ORDER — AMIODARONE HYDROCHLORIDE 200 MG/1
200 TABLET ORAL DAILY
Qty: 30 | Refills: 2 | Status: ACTIVE | COMMUNITY
Start: 2024-11-22 | End: 1900-01-01

## 2024-11-22 RX ORDER — APIXABAN 5 MG/1
5 TABLET, FILM COATED ORAL
Qty: 60 | Refills: 2 | Status: ACTIVE | COMMUNITY
Start: 2024-11-22 | End: 1900-01-01

## 2024-11-22 RX ORDER — METOPROLOL SUCCINATE 25 MG/1
25 TABLET, EXTENDED RELEASE ORAL TWICE DAILY
Qty: 30 | Refills: 2 | Status: ACTIVE | COMMUNITY
Start: 2024-11-22 | End: 1900-01-01

## 2024-11-22 RX ORDER — DAPAGLIFLOZIN 10 MG/1
10 TABLET, FILM COATED ORAL
Qty: 30 | Refills: 2 | Status: ACTIVE | COMMUNITY
Start: 2024-11-22 | End: 1900-01-01

## 2024-11-22 RX ORDER — SACUBITRIL AND VALSARTAN 24; 26 MG/1; MG/1
24-26 TABLET, FILM COATED ORAL
Qty: 60 | Refills: 1 | Status: ACTIVE | COMMUNITY
Start: 2024-11-22 | End: 1900-01-01

## 2024-11-22 RX ORDER — FUROSEMIDE 40 MG/1
40 TABLET ORAL DAILY
Qty: 30 | Refills: 2 | Status: ACTIVE | COMMUNITY
Start: 2024-11-22 | End: 1900-01-01

## 2024-11-23 ENCOUNTER — NON-APPOINTMENT (OUTPATIENT)
Age: 69
End: 2024-11-23

## 2024-11-24 PROBLEM — Z12.9 CANCER SCREENING: Status: ACTIVE | Noted: 2024-11-22

## 2024-11-24 PROBLEM — Z13.228 SCREENING FOR METABOLIC DISORDER: Status: ACTIVE | Noted: 2024-11-22

## 2024-11-24 PROBLEM — R06.02 SHORTNESS OF BREATH ON EXERTION: Status: ACTIVE | Noted: 2024-11-22

## 2024-11-24 PROBLEM — R42 DIZZINESS: Status: ACTIVE | Noted: 2024-11-22

## 2024-11-24 PROBLEM — I34.0 MITRAL REGURGITATION: Status: ACTIVE | Noted: 2024-11-22

## 2024-11-24 PROBLEM — R29.818 SUSPECTED SLEEP APNEA: Status: ACTIVE | Noted: 2024-11-22

## 2024-11-24 PROBLEM — Z71.85 IMMUNIZATION COUNSELING: Status: ACTIVE | Noted: 2024-11-24

## 2024-11-24 PROBLEM — I50.20 HEART FAILURE WITH REDUCED EJECTION FRACTION: Status: ACTIVE | Noted: 2024-11-22

## 2024-11-24 PROBLEM — I48.92 ATRIAL FLUTTER: Status: ACTIVE | Noted: 2024-11-22

## 2024-11-25 ENCOUNTER — TRANSCRIPTION ENCOUNTER (OUTPATIENT)
Age: 69
End: 2024-11-25

## 2024-11-25 LAB
ESTIMATED AVERAGE GLUCOSE: 128 MG/DL
HBA1C MFR BLD HPLC: 6.1 %

## 2024-11-27 LAB
ANION GAP SERPL CALC-SCNC: 14 MMOL/L
BASOPHILS # BLD AUTO: 0.05 K/UL
BASOPHILS NFR BLD AUTO: 0.5 %
BUN SERPL-MCNC: 25 MG/DL
CALCIUM SERPL-MCNC: 9.9 MG/DL
CHLORIDE SERPL-SCNC: 105 MMOL/L
CO2 SERPL-SCNC: 23 MMOL/L
CREAT SERPL-MCNC: 1.29 MG/DL
EGFR: 60 ML/MIN/1.73M2
EOSINOPHIL # BLD AUTO: 0.15 K/UL
EOSINOPHIL NFR BLD AUTO: 1.6 %
GLUCOSE SERPL-MCNC: 84 MG/DL
HCT VFR BLD CALC: 49.6 %
HGB BLD-MCNC: 15.5 G/DL
IMM GRANULOCYTES NFR BLD AUTO: 0.3 %
LYMPHOCYTES # BLD AUTO: 2.69 K/UL
LYMPHOCYTES NFR BLD AUTO: 29.4 %
MAN DIFF?: NORMAL
MCHC RBC-ENTMCNC: 26.8 PG
MCHC RBC-ENTMCNC: 31.3 G/DL
MCV RBC AUTO: 85.8 FL
MONOCYTES # BLD AUTO: 0.73 K/UL
MONOCYTES NFR BLD AUTO: 8 %
NEUTROPHILS # BLD AUTO: 5.49 K/UL
NEUTROPHILS NFR BLD AUTO: 60.2 %
NT-PROBNP SERPL-MCNC: 303 PG/ML
PLATELET # BLD AUTO: 375 K/UL
POTASSIUM SERPL-SCNC: 4.9 MMOL/L
RBC # BLD: 5.78 M/UL
RBC # FLD: 13.9 %
SODIUM SERPL-SCNC: 142 MMOL/L
WBC # FLD AUTO: 9.14 K/UL

## 2024-12-02 ENCOUNTER — APPOINTMENT (OUTPATIENT)
Dept: INTERNAL MEDICINE | Facility: CLINIC | Age: 69
End: 2024-12-02
Payer: MEDICARE

## 2024-12-02 VITALS
HEART RATE: 71 BPM | SYSTOLIC BLOOD PRESSURE: 100 MMHG | WEIGHT: 182.31 LBS | BODY MASS INDEX: 24.69 KG/M2 | TEMPERATURE: 98.4 F | DIASTOLIC BLOOD PRESSURE: 70 MMHG | HEIGHT: 72 IN | OXYGEN SATURATION: 97 %

## 2024-12-02 VITALS — SYSTOLIC BLOOD PRESSURE: 120 MMHG | DIASTOLIC BLOOD PRESSURE: 80 MMHG

## 2024-12-02 DIAGNOSIS — N17.9 ACUTE KIDNEY FAILURE, UNSPECIFIED: ICD-10-CM

## 2024-12-02 DIAGNOSIS — R93.1 ABNORMAL FINDINGS ON DIAGNOSTIC IMAGING OF HEART AND CORONARY CIRCULATION: ICD-10-CM

## 2024-12-02 DIAGNOSIS — R07.9 CHEST PAIN, UNSPECIFIED: ICD-10-CM

## 2024-12-02 DIAGNOSIS — Q38.3 OTHER CONGENITAL MALFORMATIONS OF TONGUE: ICD-10-CM

## 2024-12-02 DIAGNOSIS — R07.89 OTHER CHEST PAIN: ICD-10-CM

## 2024-12-02 DIAGNOSIS — R42 DIZZINESS AND GIDDINESS: ICD-10-CM

## 2024-12-02 PROCEDURE — 93000 ELECTROCARDIOGRAM COMPLETE: CPT

## 2024-12-02 PROCEDURE — G2211 COMPLEX E/M VISIT ADD ON: CPT

## 2024-12-02 PROCEDURE — 99214 OFFICE O/P EST MOD 30 MIN: CPT

## 2024-12-03 PROBLEM — R93.1 ELEVATED CORONARY ARTERY CALCIUM SCORE: Status: ACTIVE | Noted: 2024-12-02

## 2024-12-03 PROBLEM — Q38.3 TONGUE ABNORMALITY: Status: ACTIVE | Noted: 2024-12-02

## 2024-12-03 PROBLEM — R07.9 CHEST PAIN: Status: ACTIVE | Noted: 2024-12-02

## 2024-12-03 LAB
ANION GAP SERPL CALC-SCNC: 14 MMOL/L
BASOPHILS # BLD AUTO: 0.05 K/UL
BASOPHILS NFR BLD AUTO: 0.6 %
BUN SERPL-MCNC: 27 MG/DL
CALCIUM SERPL-MCNC: 10.3 MG/DL
CHLORIDE SERPL-SCNC: 104 MMOL/L
CO2 SERPL-SCNC: 24 MMOL/L
CREAT SERPL-MCNC: 1.37 MG/DL
EGFR: 55 ML/MIN/1.73M2
EOSINOPHIL # BLD AUTO: 0.16 K/UL
EOSINOPHIL NFR BLD AUTO: 1.8 %
GLUCOSE SERPL-MCNC: 91 MG/DL
HCT VFR BLD CALC: 51.3 %
HGB BLD-MCNC: 15.9 G/DL
IMM GRANULOCYTES NFR BLD AUTO: 0.3 %
LYMPHOCYTES # BLD AUTO: 2.77 K/UL
LYMPHOCYTES NFR BLD AUTO: 32 %
MAN DIFF?: NORMAL
MCHC RBC-ENTMCNC: 26.5 PG
MCHC RBC-ENTMCNC: 31 G/DL
MCV RBC AUTO: 85.5 FL
MONOCYTES # BLD AUTO: 0.78 K/UL
MONOCYTES NFR BLD AUTO: 9 %
NEUTROPHILS # BLD AUTO: 4.87 K/UL
NEUTROPHILS NFR BLD AUTO: 56.3 %
NT-PROBNP SERPL-MCNC: 181 PG/ML
PLATELET # BLD AUTO: 347 K/UL
POTASSIUM SERPL-SCNC: 4.8 MMOL/L
RBC # BLD: 6 M/UL
RBC # FLD: 13.6 %
SODIUM SERPL-SCNC: 142 MMOL/L
WBC # FLD AUTO: 8.66 K/UL

## 2024-12-04 ENCOUNTER — APPOINTMENT (OUTPATIENT)
Dept: PULMONOLOGY | Facility: CLINIC | Age: 69
End: 2024-12-04
Payer: MEDICARE

## 2024-12-04 VITALS
SYSTOLIC BLOOD PRESSURE: 134 MMHG | HEIGHT: 72 IN | DIASTOLIC BLOOD PRESSURE: 66 MMHG | BODY MASS INDEX: 24.38 KG/M2 | RESPIRATION RATE: 17 BRPM | OXYGEN SATURATION: 97 % | WEIGHT: 180 LBS | HEART RATE: 60 BPM

## 2024-12-04 DIAGNOSIS — Z78.9 OTHER SPECIFIED HEALTH STATUS: ICD-10-CM

## 2024-12-04 DIAGNOSIS — R06.02 SHORTNESS OF BREATH: ICD-10-CM

## 2024-12-04 DIAGNOSIS — R29.818 OTHER SYMPTOMS AND SIGNS INVOLVING THE NERVOUS SYSTEM: ICD-10-CM

## 2024-12-04 PROCEDURE — 99204 OFFICE O/P NEW MOD 45 MIN: CPT

## 2024-12-05 ENCOUNTER — APPOINTMENT (OUTPATIENT)
Dept: CARDIOLOGY | Facility: CLINIC | Age: 69
End: 2024-12-05
Payer: MEDICARE

## 2024-12-05 ENCOUNTER — APPOINTMENT (OUTPATIENT)
Dept: ELECTROPHYSIOLOGY | Facility: CLINIC | Age: 69
End: 2024-12-05
Payer: MEDICARE

## 2024-12-05 ENCOUNTER — NON-APPOINTMENT (OUTPATIENT)
Age: 69
End: 2024-12-05

## 2024-12-05 VITALS
HEART RATE: 52 BPM | WEIGHT: 180 LBS | SYSTOLIC BLOOD PRESSURE: 103 MMHG | HEIGHT: 72 IN | TEMPERATURE: 98.2 F | BODY MASS INDEX: 24.38 KG/M2 | DIASTOLIC BLOOD PRESSURE: 69 MMHG | OXYGEN SATURATION: 98 %

## 2024-12-05 VITALS
DIASTOLIC BLOOD PRESSURE: 60 MMHG | WEIGHT: 180 LBS | OXYGEN SATURATION: 97 % | HEART RATE: 62 BPM | HEIGHT: 72 IN | BODY MASS INDEX: 24.38 KG/M2 | RESPIRATION RATE: 16 BRPM | SYSTOLIC BLOOD PRESSURE: 92 MMHG

## 2024-12-05 DIAGNOSIS — I48.92 UNSPECIFIED ATRIAL FLUTTER: ICD-10-CM

## 2024-12-05 DIAGNOSIS — I50.20 UNSPECIFIED SYSTOLIC (CONGESTIVE) HEART FAILURE: ICD-10-CM

## 2024-12-05 DIAGNOSIS — I34.0 NONRHEUMATIC MITRAL (VALVE) INSUFFICIENCY: ICD-10-CM

## 2024-12-05 PROCEDURE — 99215 OFFICE O/P EST HI 40 MIN: CPT

## 2024-12-05 PROCEDURE — 99213 OFFICE O/P EST LOW 20 MIN: CPT

## 2024-12-05 PROCEDURE — G2211 COMPLEX E/M VISIT ADD ON: CPT

## 2024-12-05 PROCEDURE — 93000 ELECTROCARDIOGRAM COMPLETE: CPT

## 2024-12-07 ENCOUNTER — INPATIENT (INPATIENT)
Facility: HOSPITAL | Age: 69
LOS: 4 days | Discharge: HOME | End: 2024-12-12
Attending: INTERNAL MEDICINE | Admitting: INTERNAL MEDICINE
Payer: MEDICARE

## 2024-12-07 ENCOUNTER — TRANSCRIPTION ENCOUNTER (OUTPATIENT)
Age: 69
End: 2024-12-07

## 2024-12-07 VITALS
SYSTOLIC BLOOD PRESSURE: 110 MMHG | TEMPERATURE: 98 F | OXYGEN SATURATION: 97 % | DIASTOLIC BLOOD PRESSURE: 60 MMHG | HEIGHT: 68 IN | HEART RATE: 60 BPM | RESPIRATION RATE: 16 BRPM | WEIGHT: 179.02 LBS

## 2024-12-07 DIAGNOSIS — R42 DIZZINESS AND GIDDINESS: ICD-10-CM

## 2024-12-07 LAB
ALBUMIN SERPL ELPH-MCNC: 3.8 G/DL — SIGNIFICANT CHANGE UP (ref 3.3–5)
ALP SERPL-CCNC: 108 U/L — SIGNIFICANT CHANGE UP (ref 40–120)
ALT FLD-CCNC: 17 U/L — SIGNIFICANT CHANGE UP (ref 4–41)
ANION GAP SERPL CALC-SCNC: 12 MMOL/L — SIGNIFICANT CHANGE UP (ref 7–14)
APTT BLD: 34.8 SEC — SIGNIFICANT CHANGE UP (ref 24.5–35.6)
AST SERPL-CCNC: 18 U/L — SIGNIFICANT CHANGE UP (ref 4–40)
BASOPHILS # BLD AUTO: 0.06 K/UL — SIGNIFICANT CHANGE UP (ref 0–0.2)
BASOPHILS NFR BLD AUTO: 0.6 % — SIGNIFICANT CHANGE UP (ref 0–2)
BILIRUB SERPL-MCNC: 0.2 MG/DL — SIGNIFICANT CHANGE UP (ref 0.2–1.2)
BUN SERPL-MCNC: 28 MG/DL — HIGH (ref 7–23)
CALCIUM SERPL-MCNC: 9.2 MG/DL — SIGNIFICANT CHANGE UP (ref 8.4–10.5)
CHLORIDE SERPL-SCNC: 103 MMOL/L — SIGNIFICANT CHANGE UP (ref 98–107)
CO2 SERPL-SCNC: 23 MMOL/L — SIGNIFICANT CHANGE UP (ref 22–31)
CREAT SERPL-MCNC: 1.67 MG/DL — HIGH (ref 0.5–1.3)
EGFR: 44 ML/MIN/1.73M2 — LOW
EOSINOPHIL # BLD AUTO: 0.16 K/UL — SIGNIFICANT CHANGE UP (ref 0–0.5)
EOSINOPHIL NFR BLD AUTO: 1.5 % — SIGNIFICANT CHANGE UP (ref 0–6)
GLUCOSE SERPL-MCNC: 89 MG/DL — SIGNIFICANT CHANGE UP (ref 70–99)
HCT VFR BLD CALC: 49.8 % — SIGNIFICANT CHANGE UP (ref 39–50)
HGB BLD-MCNC: 15.8 G/DL — SIGNIFICANT CHANGE UP (ref 13–17)
IANC: 7.21 K/UL — SIGNIFICANT CHANGE UP (ref 1.8–7.4)
IMM GRANULOCYTES NFR BLD AUTO: 0.4 % — SIGNIFICANT CHANGE UP (ref 0–0.9)
INR BLD: 1.07 RATIO — SIGNIFICANT CHANGE UP (ref 0.85–1.16)
LYMPHOCYTES # BLD AUTO: 2.4 K/UL — SIGNIFICANT CHANGE UP (ref 1–3.3)
LYMPHOCYTES # BLD AUTO: 22.1 % — SIGNIFICANT CHANGE UP (ref 13–44)
MCHC RBC-ENTMCNC: 26.2 PG — LOW (ref 27–34)
MCHC RBC-ENTMCNC: 31.7 G/DL — LOW (ref 32–36)
MCV RBC AUTO: 82.5 FL — SIGNIFICANT CHANGE UP (ref 80–100)
MONOCYTES # BLD AUTO: 0.97 K/UL — HIGH (ref 0–0.9)
MONOCYTES NFR BLD AUTO: 8.9 % — SIGNIFICANT CHANGE UP (ref 2–14)
NEUTROPHILS # BLD AUTO: 7.21 K/UL — SIGNIFICANT CHANGE UP (ref 1.8–7.4)
NEUTROPHILS NFR BLD AUTO: 66.5 % — SIGNIFICANT CHANGE UP (ref 43–77)
NRBC # BLD: 0 /100 WBCS — SIGNIFICANT CHANGE UP (ref 0–0)
NRBC # FLD: 0 K/UL — SIGNIFICANT CHANGE UP (ref 0–0)
NT-PROBNP SERPL-SCNC: 111 PG/ML — SIGNIFICANT CHANGE UP
PLATELET # BLD AUTO: 298 K/UL — SIGNIFICANT CHANGE UP (ref 150–400)
POTASSIUM SERPL-MCNC: 4.6 MMOL/L — SIGNIFICANT CHANGE UP (ref 3.5–5.3)
POTASSIUM SERPL-SCNC: 4.6 MMOL/L — SIGNIFICANT CHANGE UP (ref 3.5–5.3)
PROT SERPL-MCNC: 7.3 G/DL — SIGNIFICANT CHANGE UP (ref 6–8.3)
PROTHROM AB SERPL-ACNC: 12.4 SEC — SIGNIFICANT CHANGE UP (ref 9.9–13.4)
RBC # BLD: 6.04 M/UL — HIGH (ref 4.2–5.8)
RBC # FLD: 13.6 % — SIGNIFICANT CHANGE UP (ref 10.3–14.5)
SODIUM SERPL-SCNC: 138 MMOL/L — SIGNIFICANT CHANGE UP (ref 135–145)
TROPONIN T, HIGH SENSITIVITY RESULT: 10 NG/L — SIGNIFICANT CHANGE UP
TROPONIN T, HIGH SENSITIVITY RESULT: 8 NG/L — SIGNIFICANT CHANGE UP
WBC # BLD: 10.84 K/UL — HIGH (ref 3.8–10.5)
WBC # FLD AUTO: 10.84 K/UL — HIGH (ref 3.8–10.5)

## 2024-12-07 PROCEDURE — 71046 X-RAY EXAM CHEST 2 VIEWS: CPT | Mod: 26

## 2024-12-07 PROCEDURE — 99285 EMERGENCY DEPT VISIT HI MDM: CPT

## 2024-12-07 RX ORDER — INFLUENZA VIRUS VACCINE 15; 15; 15; 15 UG/.5ML; UG/.5ML; UG/.5ML; UG/.5ML
0.5 SUSPENSION INTRAMUSCULAR ONCE
Refills: 0 | Status: DISCONTINUED | OUTPATIENT
Start: 2024-12-07 | End: 2024-12-12

## 2024-12-07 NOTE — ED ADULT NURSE REASSESSMENT NOTE - NS ED NURSE REASSESS COMMENT FT1
Report received from day BRENDA Tran. Pt is A&Ox4 and ambulatory without assistance. Pt appears comfortable and in NAD. Pt still c/o dizziness when standing up/walking, but states that he does not feel dizzy while laying down. Denies chest pain, N/V, headache. Respirations even and unlabored. Safety maintained with stretcher in lowest position.

## 2024-12-07 NOTE — ED PROVIDER NOTE - CHIEF COMPLAINT
Pt thought her glucose needs to be rechecked at her 6 wk pp visit and is wondering if she needs to be fasting. Please call patient back to verify that it will be rechecked at this appointment   The patient is a 69y Male complaining of dizziness.

## 2024-12-07 NOTE — ED PROVIDER NOTE - PROGRESS NOTE DETAILS
Marc Espinal MD PGY3: Patient was signed out to me from previous provider at signout. pt with dizzy episodes some similar to vertiginous episodes, on new meds with recent CHF/aflutter, possibly iatrogenic bp drop etiology, requires optimization. cdu declined pt. spoke with covering doc for his cardiologist, to admit hospitalist for management. CDU GIFTY Ness Note-----I was contacted by ED attending Dr. Shannon to discuss this patient and consider potential CDU placement for further management.  Pt had recent inpatient admission at [facility] 11/10/24 - 11/14/24; per Provider Discharge Note documentation, pt had stated no PMH on admission, was admitted for chest pain and SOB x 2 weeks, later admitted to hx/o intermittent syncopal episodes since childhood and HLD in 2022 that resolved with diet changes.  During admission, pt found to be in atrial flutter; TTE showed new biventricular failure, LVEF ~25%.  Pt was seen by EP cardiology, underwent GERALDINE w/ DCCV with return to sinus rhythm.  Pt was seen by HF cardiology; pt was given digoxin load, started on amiodarone, GDMT, and diuresis.  Per Provider Discharge Note, Cards advised outpatient heart cath and f/u TTE outpatient as well.    Given pt's PMH, and current symptoms together with intermittent episodes of hypotension, this patient was felt by me to be better suited for inpatient admission, as recent meds may need further optimization and this will require time, which is outside of CDU parameters.  Per EMR Visit History, I note pt follows with cardiologist Dr. Ernesto Silverio, part of [facility]; pt was recently seen outpatient by Dr. Silverio on 12/5/24 (per [facility] documentation).  I contacted his service ([phone]) and subsequently discussed with on-call cardiologist Dr. Rafal Andrade who advised further inpatient management under house cardiology.  I relayed all of this to ED attending Dr. Shannon, and pt will be admitted inpatient for continued management. CDU GIFTY Ness Note-----I was contacted by ED attending Dr. Shannon to discuss this patient and consider potential CDU placement for further management; case discussed and test results performed thus far were reviewed and discussed.  Pt had recent inpatient admission at [facility] 11/10/24 - 11/14/24; per Provider Discharge Note documentation from that admission, pt had stated no PMH on admission, was admitted for chest pain and SOB x 2 weeks, later admitted to hx/o intermittent syncopal episodes since childhood and hx/o HLD in 2022 that resolved with diet changes.  During admission, pt found to be in atrial flutter; TTE showed new biventricular failure, LVEF ~25%.  Pt was seen by EP cardiology, underwent GERALDINE w/ DCCV with return to sinus rhythm.  Pt was seen by HF cardiology; pt was given digoxin load, started on amiodarone, GDMT, and diuresis.  Per Provider Discharge Note, Cards advised outpatient heart cath and f/u TTE outpatient as well.  Per dc med reconciliation, pt was Rx'd amiodarone, apixaban, dapagliflozin, furosemide, metoprolol succ, sacubitril/valsartan, and spironolactone.  Given pt's PMH, recent new med regimen, and current symptoms together with intermittent episodes of hypotension at present, this patient was felt by me to be better suited for inpatient admission, as recent meds may need further optimization and this will require time, which is anticipated to require time outside of CDU parameters.  Per EMR Visit History, I note that pt follows with cardiologist Dr. Ernesto Silverio, part of [facility]; pt was recently seen outpatient by Dr. Silverio on 12/5/24 (per [facility] documentation).  I contacted Dr. Silverio's service ([phone]) and subsequently discussed with on-call cardiologist Dr. Rafal Andrade who advised further inpatient management under house cardiology.  I relayed all of this to ED attending Dr. Shannon, and pt will be admitted inpatient for continued management.

## 2024-12-07 NOTE — CONSULT NOTE ADULT - SUBJECTIVE AND OBJECTIVE BOX
HPI: 68y/o M w PMH of HFrEF w EF 15-20% per echo 11/24, AFlutter on Eliquis s/p DCCV 11/2024, vertigo, presenting to ED due to dizziness and hypotension at home for which cardiology was consulted.     Pt says he had dizziness w lightheadedness earlier todat at home and felt the symptoms progressively got worse and not relieved w rest. He felt symptoms were not similar to his usual vertigo. He checked BP at home and noticed SBP was in 60s and thus presented to ED. In ED, pts's BP has been in sin SBP 80-100s. Orthostatic vitals x 1 neg. Trop, pBNP x 1 neg. CXR unremarkable. Pt has been compliant w his GDMT at home and no recent changes to his meds. Pt says he has been feeling lacking energy at home recently and occasionally chest tightness that relives in few mins. Pt has not noticed any weight gain or decreased urine output.  He has been maintaining adequate fluid intake at home.     Pt denies any palpitation, sob, orthopnea, PND or syncope. Denies any fever, chills, nausea, vomiting. Denies any sick contacts, recent travel.     ROS as above    T(C): 36.5 (12-07-24 @ 16:09), Max: 36.5 (12-07-24 @ 16:09)  HR: 56 (12-07-24 @ 19:22) (55 - 63)  BP: 98/83 (12-07-24 @ 20:09) (83/58 - 110/60)  RR: 16 (12-07-24 @ 19:22) (16 - 17)  SpO2: 98% (12-07-24 @ 19:22) (97% - 98%)    MEDICATIONS  (STANDING):    MEDICATIONS  (PRN):      I&O's Summary                        15.8                 138  | 23   | 28           10.84 >-----------< 298     ------------------------< 89                    49.8                 4.6  | 103  | 1.67                                         Ca 9.2   Mg x     Ph x      Urinalysis Basic - ( 07 Dec 2024 18:08 )    Color: x / Appearance: x / SG: x / pH: x  Gluc: 89 mg/dL / Ketone: x  / Bili: x / Urobili: x   Blood: x / Protein: x / Nitrite: x   Leuk Esterase: x / RBC: x / WBC x   Sq Epi: x / Non Sq Epi: x / Bacteria: x        PT/INR - ( 07 Dec 2024 18:08 )   PT: 12.4 sec;   INR: 1.07 ratio         PTT - ( 07 Dec 2024 18:08 )  PTT:34.8 sec     HPI: 68y/o M w PMH of HFrEF w EF 26% per echo 11/24, AFlutter on Eliquis s/p DCCV 11/2024, vertigo, presenting to ED due to dizziness and hypotension at home for which cardiology was consulted.     Pt says he had dizziness w lightheadedness earlier todat at home and felt the symptoms progressively got worse and not relieved w rest. He felt symptoms were not similar to his usual vertigo. He checked BP at home and noticed SBP was in 60s and thus presented to ED. In ED, pts's BP has been in sin SBP 80-100s. Orthostatic vitals x 1 neg. Trop, pBNP x 1 neg. CXR unremarkable. Pt has been compliant w his GDMT at home and no recent changes to his meds. Pt says he has been feeling lacking energy at home recently and occasionally chest tightness that relives in few mins. Pt has not noticed any weight gain or decreased urine output.  He has been maintaining adequate fluid intake at home.     Pt denies any palpitation, sob, orthopnea, PND or syncope. Denies any fever, chills, nausea, vomiting. Denies any sick contacts, recent travel.     ROS as above    T(C): 36.5 (12-07-24 @ 16:09), Max: 36.5 (12-07-24 @ 16:09)  HR: 56 (12-07-24 @ 19:22) (55 - 63)  BP: 98/83 (12-07-24 @ 20:09) (83/58 - 110/60)  RR: 16 (12-07-24 @ 19:22) (16 - 17)  SpO2: 98% (12-07-24 @ 19:22) (97% - 98%)    MEDICATIONS  (STANDING):    MEDICATIONS  (PRN):      I&O's Summary                        15.8                 138  | 23   | 28           10.84 >-----------< 298     ------------------------< 89                    49.8                 4.6  | 103  | 1.67                                         Ca 9.2   Mg x     Ph x      Urinalysis Basic - ( 07 Dec 2024 18:08 )    Color: x / Appearance: x / SG: x / pH: x  Gluc: 89 mg/dL / Ketone: x  / Bili: x / Urobili: x   Blood: x / Protein: x / Nitrite: x   Leuk Esterase: x / RBC: x / WBC x   Sq Epi: x / Non Sq Epi: x / Bacteria: x        PT/INR - ( 07 Dec 2024 18:08 )   PT: 12.4 sec;   INR: 1.07 ratio         PTT - ( 07 Dec 2024 18:08 )  PTT:34.8 sec    < from: GERALDINE W or WO Ultrasound Enhancing Agent (11.12.24 @ 13:14) >    CPT:               3D RECONST W/O WKSTATION - 81368.m;ECHO TRANSESOPH W/O CON -                     84174.m;DOPPLER ECHO COMP W SPECT - 42456.m;DOPPLECHO COLOR                     FLOW - 00104.m  Indication(s):     Unspecified atrial fibrillation - I48.91  Procedure:         Transesophageal echocardiogram performed with 2D, M-mode and                     complete spectral and color flow Doppler. Real time and full                     volume 3-dimensional imaging performed at the echo machine.  Ordering Location: [facility]  Admission Status:  Inpatient  Agitated Saline:   Injection with agitated saline was performed to evaluate for     intracardiac shunting.  Study Information: Image quality for this study is good.    _______________________________________________________________________________________     CONCLUSIONS:      1. Left ventricular systolic function is severely decreased. Global left ventricular hypokinesis.   2. Enlarged right ventricular cavity size and reduced right ventricular systolic function.   3. There is mitral valve thickening of the anterior and posterior leaflets. There is moderate mitral regurgitation. Vena contracta width ~ 0.4-0.5 cm. Estimated effective regurgitant orifice area (EROA) ~ 0.34 cm2 (by the PISA method).   4. The aortic valve appears trileaflet with normal systolic excursion. There is calcification of the aortic valve leaflets. There is no evidence of aortic regurgitation.   5. No atheroma in the visualized portions of the proximal ascending aorta. No atheroma in the visualized portions of the transverse aortic arch. No atheroma in the visualized portions of the descending aorta.   6. The left atrium is dilated. There is no evidence of left atrial or left atrial appendage thrombus.   7. Agitated saline injection was negative for intracardiac shunt.    < end of copied text >  < from: TTE W or WO Ultrasound Enhancing Agent (11.10.24 @ 15:08) >  CPT:               ECHO TTE WO CON COMP W DOPP - 96269.m  Indication(s):     Unspecified atrial flutter - I48.92  Procedure:         Transthoracic echocardiogram with 2-D, M-mode and complete                     spectral and color flow Doppler.  Ordering Location: [facility]  Admission Status:  ED  Study Information: Image quality for this study is good.    _______________________________________________________________________________________     CONCLUSIONS:      1. Left ventricular systolic function is severely decreased with an ejection fraction of 26 % by Wolff's method of disks. Global left ventricular hypokinesis.   2. Enlarged right ventricular cavity size, with normal wall thickness, and severely reduced right ventricular systolic function. Tricuspid annular plane systolic excursion (TAPSE) is 1.2 cm (normal >=1.7 cm).   3. Left atrium is severely dilated.   4. The right atrium is severely dilated.   5. Severe mitral regurgitation.   6. Suggest re evaluate after rate/rhythm control.   7. Estimated pulmonary artery systolic pressure is 38 mmHg.   8. No prior echocardiogram is available for comparison.   9. Pulmonary artery systolic pressure may be underestimated due to severe tricuspid regurgitation.  10. The inferior vena cava is dilated measuring 2.93 cm in diameter, (dilated >2.1cm) with abnormal inspiratory collapse (abnormal <50%) consistent with elevated right atrialpressure (~15, range 10-20mmHg).  11. There is normal LV mass and concentric remodeling.    < end of copied text >

## 2024-12-07 NOTE — ED ADULT NURSE NOTE - OBJECTIVE STATEMENT
Yes
presents to ED after feeling dizzy/lightheaded today since 1230pm. endorses suffering from vertigo but this "feels different." recent dx of CHF on numerous medications now. found to have low bp at home- 80s systolic. 80s systolic in triage. now normotensive in stretcher. denies cp/sob, vomiting/diarrhea. no blood in stool/urine. Pt endorses urinating more frequently but placed on diuretic medications for the chf. PT is aao x 4, speaking in complete sentences, no drooling noted. RR even and unlabored. attached to CM.

## 2024-12-07 NOTE — ED ADULT TRIAGE NOTE - CHIEF COMPLAINT QUOTE
pt c/o dizziness today , took 4 attempts to get up from cough, home SBPs 60s. + weakness pt med hx htn, a-fib recent ablation, CHF, vertigo  fs= 124

## 2024-12-07 NOTE — ED PROVIDER NOTE - CLINICAL SUMMARY MEDICAL DECISION MAKING FREE TEXT BOX
69-year-old male with no history of congestive heart failure EF 25% and a flutter status post cardioversion presenting with intermittent episodes of dizziness.  Some episodes similar to remote history of vertiginous dizziness but others are different, with reported correlation to low systolic blood pressures to the 60s to 80s.  Without clinical findings on exam suggestive of volume overload, clinical findings not suggestive of PE.  Possibly iatrogenic etiology with the medications versus cardiac arrhythmia.  Labs, x-ray unremarkable, sinus bradycardia on EKG and monitor.  Discussed with primary primary patient's outpatient cardiologist Dr. Silverio, to admit for further evaluation. Marc Espinal MD PGY3

## 2024-12-07 NOTE — ED ADULT NURSE NOTE - CHIEF COMPLAINT QUOTE
pt c/o dizziness today , took 4 attempts to get up from floor, home SBPs 60s. + weakness pt med hx htn, a-fib recent ablation, CHF, vertigo  fs= 124

## 2024-12-07 NOTE — CONSULT NOTE ADULT - ASSESSMENT
HPI: 70y/o M w PMH of HFrEF w EF 15-20% per echo 11/24, AFlutter on Eliquis s/p DCCV 11/2024, vertigo, presenting to ED due to dizziness and hypotension at home for which cardiology was consulted.     Pt says he had dizziness w lightheadedness earlier todat at home and felt the symptoms progressively got worse and not relieved w rest. He felt symptoms were not similar to his usual vertigo. He checked BP at home and noticed SBP was in 60s and thus presented to ED. In ED, pts's BP has been in sin SBP 80-100s. Orthostatic vitals x 1 neg. Trop, pBNP x 1 neg. CXR unremarkable. Pt has been compliant w his GDMT at home and no recent changes to his meds. Pt says he has been feeling lacking energy at home recently and occasionally chest tightness that relives in few mins. Pt has not noticed any weight gain or decreased urine output.  He has been maintaining adequate fluid intake at home.     Assessment and Plan:  # Dizziness/hypotension  Pt feels dizziness w lightheadedness different from his usual vertigo. SBP in 60s at home. Orthostatic vitals x 1 neg. EKG w no acute ischemic changes, Trop, pBNP x 1 neg. CXR unremarkable. Pt does not appear volume overloaded. Low suspicion for cardiogenic shock or ACS.  Cr seems to be worse from prior could be from overdiuresis vs GDMT   - Pt does not require CCU level of care  - Continue tele monitoring  - Hold Entresto, Lasix, aldactone tonight  - continue Eliquis Amiodarone, Toprol xl  - monitor kidney function  - strict I and O.   - repeat orthostatic vitals  - would check lactate  - replete lytes to keep K>4, Mg>2    Case discussed w fellow Dr Lorenza riley  Cardiology will follow   HPI: 68y/o M w PMH of HFrEF w EF 26% per echo 11/24, AFlutter on Eliquis s/p DCCV 11/2024, vertigo, presenting to ED due to dizziness and hypotension at home for which cardiology was consulted.     Pt says he had dizziness w lightheadedness earlier todat at home and felt the symptoms progressively got worse and not relieved w rest. He felt symptoms were not similar to his usual vertigo. He checked BP at home and noticed SBP was in 60s and thus presented to ED. In ED, pts's BP has been in sin SBP 80-100s. Orthostatic vitals x 1 neg. Trop, pBNP x 1 neg. CXR unremarkable. Pt has been compliant w his GDMT at home and no recent changes to his meds. Pt says he has been feeling lacking energy at home recently and occasionally chest tightness that relives in few mins. Pt has not noticed any weight gain or decreased urine output.  He has been maintaining adequate fluid intake at home.     Assessment and Plan:  # Dizziness/hypotension  Pt feels dizziness w lightheadedness different from his usual vertigo. SBP in 60s at home. Orthostatic vitals x 1 neg. EKG w no acute ischemic changes, Trop, pBNP x 1 neg. CXR unremarkable. Pt does not appear volume overloaded. Low suspicion for cardiogenic shock or ACS.  Cr seems to be worse from prior could be from overdiuresis vs GDMT   - Pt does not require CCU level of care  - Continue tele monitoring  - Hold Entresto, Lasix, aldactone tonight  - continue Eliquis Amiodarone, Toprol xl  - monitor kidney function  - strict I and O.   - repeat orthostatic vitals  - would check lactate  - replete lytes to keep K>4, Mg>2    Case discussed w fellow Dr Lorenza riley  Cardiology will follow

## 2024-12-07 NOTE — ED PROVIDER NOTE - ATTENDING CONTRIBUTION TO CARE
Patient with atrial fibrillation status post recent ablation, recently diagnosed with heart failure with EF 25% started on multiple new medications presents emergency department intermittent episodes of lightheadedness associated with significant hypotension and bradycardia.  During episode today went to systolic of 60s, had another episode in the ED ambulance bay went down to systolic of 60s.  Noted to have sinus bradycardia to 50s.  At time of evaluation patient had normal blood pressure and states he is currently asymptomatic.  Denies any numbness, tingling, weakness to extremities, vomiting, severe headache, chest pain, palpitations or shortness of breath.  Hypotension may be 2/2 medications, will admit for tele, cardiology consult, possible echo

## 2024-12-07 NOTE — ED ADULT TRIAGE NOTE - MEANS OF ARRIVAL
ambulatory Doxycycline Pregnancy And Lactation Text: This medication is Pregnancy Category D and not consider safe during pregnancy. It is also excreted in breast milk but is considered safe for shorter treatment courses.

## 2024-12-07 NOTE — ED ADULT NURSE NOTE - NSFALLHARMRISKINTERV_ED_ALL_ED
Assistance OOB with selected safe patient handling equipment if applicable/Assistance with ambulation/Communicate risk of Fall with Harm to all staff, patient, and family/Encourage patient to sit up slowly, dangle for a short time, stand at bedside before walking/Monitor gait and stability/Orthostatic vital signs/Provide visual cue: red socks, yellow wristband, yellow gown, etc/Reinforce activity limits and safety measures with patient and family/Bed in lowest position, wheels locked, appropriate side rails in place/Call bell, personal items and telephone in reach/Instruct patient to call for assistance before getting out of bed/chair/stretcher/Non-slip footwear applied when patient is off stretcher/[place] to call system/Physically safe environment - no spills, clutter or unnecessary equipment/Purposeful Proactive Rounding/Room/bathroom lighting operational, light cord in reach

## 2024-12-07 NOTE — ED PROVIDER NOTE - OBJECTIVE STATEMENT
69-year-old male past medical history of recently diagnosed atrial flutter (status post GERALDINE with DCCV in the past month).  TTE showed new biventricular failure LVEF of 25% started on GDMT, now here for dizziness low SBP this morning.  Feeling of unsteadiness on his feet. Found to be hypotnsive to 60s systolic after first episode of dizziness with intermittent chest pain. Denies headache, changes in vision, changes in sensation to the face UE/LE, focal weakness, palpitations or feelings of an irregular heartbeat. No E swelling and has been compliant with medication regimen. No new increases in doses of GDMT or lasix. VS signficant for BP to 104/62 upon evaluation, mentating well, found to have nystagmus (as per patient this is a chronic issue), Lungs CTAB b/l no belly pain, no pedal edema appreciated. Concern for cardiac cause of dizziness (arrythmia vs worsening HF? vs iatrogenic cause of dzziness, will do cardiac work up and likely admit.

## 2024-12-08 DIAGNOSIS — I48.92 UNSPECIFIED ATRIAL FLUTTER: ICD-10-CM

## 2024-12-08 DIAGNOSIS — R00.1 BRADYCARDIA, UNSPECIFIED: ICD-10-CM

## 2024-12-08 DIAGNOSIS — Z29.9 ENCOUNTER FOR PROPHYLACTIC MEASURES, UNSPECIFIED: ICD-10-CM

## 2024-12-08 DIAGNOSIS — I50.22 CHRONIC SYSTOLIC (CONGESTIVE) HEART FAILURE: ICD-10-CM

## 2024-12-08 DIAGNOSIS — R42 DIZZINESS AND GIDDINESS: ICD-10-CM

## 2024-12-08 DIAGNOSIS — R59.0 LOCALIZED ENLARGED LYMPH NODES: ICD-10-CM

## 2024-12-08 LAB
ANION GAP SERPL CALC-SCNC: 11 MMOL/L — SIGNIFICANT CHANGE UP (ref 7–14)
BUN SERPL-MCNC: 27 MG/DL — HIGH (ref 7–23)
CALCIUM SERPL-MCNC: 9.1 MG/DL — SIGNIFICANT CHANGE UP (ref 8.4–10.5)
CHLORIDE SERPL-SCNC: 102 MMOL/L — SIGNIFICANT CHANGE UP (ref 98–107)
CO2 SERPL-SCNC: 23 MMOL/L — SIGNIFICANT CHANGE UP (ref 22–31)
CREAT SERPL-MCNC: 1.38 MG/DL — HIGH (ref 0.5–1.3)
EGFR: 55 ML/MIN/1.73M2 — LOW
GLUCOSE SERPL-MCNC: 91 MG/DL — SIGNIFICANT CHANGE UP (ref 70–99)
HCT VFR BLD CALC: 47 % — SIGNIFICANT CHANGE UP (ref 39–50)
HGB BLD-MCNC: 15.6 G/DL — SIGNIFICANT CHANGE UP (ref 13–17)
LACTATE SERPL-SCNC: 1.7 MMOL/L — SIGNIFICANT CHANGE UP (ref 0.5–2)
MAGNESIUM SERPL-MCNC: 2.1 MG/DL — SIGNIFICANT CHANGE UP (ref 1.6–2.6)
MCHC RBC-ENTMCNC: 26.7 PG — LOW (ref 27–34)
MCHC RBC-ENTMCNC: 33.2 G/DL — SIGNIFICANT CHANGE UP (ref 32–36)
MCV RBC AUTO: 80.3 FL — SIGNIFICANT CHANGE UP (ref 80–100)
NRBC # BLD: 0 /100 WBCS — SIGNIFICANT CHANGE UP (ref 0–0)
NRBC # FLD: 0 K/UL — SIGNIFICANT CHANGE UP (ref 0–0)
PHOSPHATE SERPL-MCNC: 4.2 MG/DL — SIGNIFICANT CHANGE UP (ref 2.5–4.5)
PLATELET # BLD AUTO: 256 K/UL — SIGNIFICANT CHANGE UP (ref 150–400)
POTASSIUM SERPL-MCNC: 4.3 MMOL/L — SIGNIFICANT CHANGE UP (ref 3.5–5.3)
POTASSIUM SERPL-SCNC: 4.3 MMOL/L — SIGNIFICANT CHANGE UP (ref 3.5–5.3)
RBC # BLD: 5.85 M/UL — HIGH (ref 4.2–5.8)
RBC # FLD: 13.3 % — SIGNIFICANT CHANGE UP (ref 10.3–14.5)
SODIUM SERPL-SCNC: 136 MMOL/L — SIGNIFICANT CHANGE UP (ref 135–145)
T3 SERPL-MCNC: 84 NG/DL — SIGNIFICANT CHANGE UP (ref 80–200)
T4 FREE SERPL-MCNC: 1.3 NG/DL — SIGNIFICANT CHANGE UP (ref 0.9–1.7)
TSH SERPL-MCNC: 1.18 UIU/ML — SIGNIFICANT CHANGE UP (ref 0.27–4.2)
WBC # BLD: 8.27 K/UL — SIGNIFICANT CHANGE UP (ref 3.8–10.5)
WBC # FLD AUTO: 8.27 K/UL — SIGNIFICANT CHANGE UP (ref 3.8–10.5)

## 2024-12-08 PROCEDURE — 99232 SBSQ HOSP IP/OBS MODERATE 35: CPT

## 2024-12-08 PROCEDURE — 12345: CPT | Mod: NC

## 2024-12-08 PROCEDURE — 99223 1ST HOSP IP/OBS HIGH 75: CPT

## 2024-12-08 RX ORDER — FAMOTIDINE 20 MG/1
20 TABLET, FILM COATED ORAL DAILY
Refills: 0 | Status: DISCONTINUED | OUTPATIENT
Start: 2024-12-08 | End: 2024-12-12

## 2024-12-08 RX ORDER — DAPAGLIFLOZIN 10 MG/1
10 TABLET, FILM COATED ORAL DAILY
Refills: 0 | Status: DISCONTINUED | OUTPATIENT
Start: 2024-12-08 | End: 2024-12-09

## 2024-12-08 RX ORDER — AMIODARONE HYDROCHLORIDE 200 MG/1
200 TABLET ORAL DAILY
Refills: 0 | Status: DISCONTINUED | OUTPATIENT
Start: 2024-12-08 | End: 2024-12-09

## 2024-12-08 RX ORDER — APIXABAN 2.5 MG/1
5 TABLET, FILM COATED ORAL EVERY 12 HOURS
Refills: 0 | Status: DISCONTINUED | OUTPATIENT
Start: 2024-12-08 | End: 2024-12-10

## 2024-12-08 RX ORDER — ACETAMINOPHEN, DIPHENHYDRAMINE HCL, PHENYLEPHRINE HCL 325; 25; 5 MG/1; MG/1; MG/1
3 TABLET ORAL AT BEDTIME
Refills: 0 | Status: DISCONTINUED | OUTPATIENT
Start: 2024-12-08 | End: 2024-12-12

## 2024-12-08 RX ADMIN — DAPAGLIFLOZIN 10 MILLIGRAM(S): 10 TABLET, FILM COATED ORAL at 12:20

## 2024-12-08 RX ADMIN — ACETAMINOPHEN, DIPHENHYDRAMINE HCL, PHENYLEPHRINE HCL 3 MILLIGRAM(S): 325; 25; 5 TABLET ORAL at 21:15

## 2024-12-08 RX ADMIN — APIXABAN 5 MILLIGRAM(S): 2.5 TABLET, FILM COATED ORAL at 17:51

## 2024-12-08 RX ADMIN — AMIODARONE HYDROCHLORIDE 200 MILLIGRAM(S): 200 TABLET ORAL at 12:20

## 2024-12-08 NOTE — PROGRESS NOTE ADULT - SUBJECTIVE AND OBJECTIVE BOX
[facility] Department of Hospital Medicine  Carli Lea MD  Available on MS Teams  Pager: [phone]    Patient is a 69y old  Male who presents with a chief complaint of dizziness, low BP (08 Dec 2024 04:12)    OVERNIGHT EVENTS: No acute events overnight.    SUBJECTIVE: Pt seen and examined at bedside this morning. Denies any further dizziness. Notes that he has been having issues with sleep, notably that he has been waking up in the middle of night most nights and is unable to return to sleep. Also notes that he has been experiencing a persistent dry cough for years. Denies any fevers, CP or SOB currently.    ADDITIONAL REVIEW OF SYSTEMS: as above.    MEDICATIONS  (STANDING):  aMIOdarone    Tablet 200 milliGRAM(s) Oral daily  apixaban 5 milliGRAM(s) Oral every 12 hours  dapagliflozin 10 milliGRAM(s) Oral daily  famotidine    Tablet 20 milliGRAM(s) Oral daily  influenza  Vaccine (HIGH DOSE) 0.5 milliLiter(s) IntraMuscular once  melatonin 3 milliGRAM(s) Oral at bedtime    MEDICATIONS  (PRN):    CAPILLARY BLOOD GLUCOSE    I&O's Summary    08 Dec 2024 07:01  -  08 Dec 2024 16:48  --------------------------------------------------------  IN: 360 mL / OUT: 0 mL / NET: 360 mL    PHYSICAL EXAM:  Vital Signs Last 24 Hrs  T(C): 36.4 (08 Dec 2024 15:40), Max: 37.2 (07 Dec 2024 23:05)  T(F): 97.6 (08 Dec 2024 15:40), Max: 98.9 (07 Dec 2024 23:05)  HR: 66 (08 Dec 2024 15:40) (53 - 110)  BP: 93/59 (08 Dec 2024 15:40) (93/59 - 109/66)  BP(mean): 84 (07 Dec 2024 22:06) (84 - 84)  RR: 18 (08 Dec 2024 15:40) (15 - 18)  SpO2: 96% (08 Dec 2024 15:40) (96% - 99%)    Parameters below as of 08 Dec 2024 15:40  Patient On (Oxygen Delivery Method): room air    CONSTITUTIONAL: NAD, well-developed  HEAD: Normocephalic, atraumatic  EYES: EOMI, PERRL  ENT: no rhinorrhea, no pharyngeal erythema  RESPIRATORY: No increased work of breathing, CTAB, no wheezes or crackles appreciated  CARDIOVASCULAR: RRR, S1 and S2 present, no m/r/g  ABDOMEN: soft, NT, ND, bowel sounds present  EXTREMITIES: No LE edema  MUSCULOSKELETAL: no joint swelling, no tenderness to palpation  NEURO: A&Ox3, moving all extremities    LABS:                        15.6   8.27  )-----------( 256      ( 08 Dec 2024 07:38 )             47.0     12-08    136  |  102  |  27[H]  ----------------------------<  91  4.3   |  23  |  1.38[H]    Ca    9.1      08 Dec 2024 07:38  Phos  4.2     12-08  Mg     2.10     12-08    TPro  7.3  /  Alb  3.8  /  TBili  0.2  /  DBili  x   /  AST  18  /  ALT  17  /  AlkPhos  108  12-07    PT/INR - ( 07 Dec 2024 18:08 )   PT: 12.4 sec;   INR: 1.07 ratio      PTT - ( 07 Dec 2024 18:08 )  PTT:34.8 sec    Urinalysis Basic - ( 08 Dec 2024 07:38 )    Color: x / Appearance: x / SG: x / pH: x  Gluc: 91 mg/dL / Ketone: x  / Bili: x / Urobili: x   Blood: x / Protein: x / Nitrite: x   Leuk Esterase: x / RBC: x / WBC x   Sq Epi: x / Non Sq Epi: x / Bacteria: x    RADIOLOGY & ADDITIONAL TESTS:    Results Reviewed:   Imaging Personally Reviewed:  Electrocardiogram Personally Reviewed:    COORDINATION OF CARE:  Care Discussed with Consultants/Other Providers [Y/N]:  Prior or Outpatient Records Reviewed [Y/N]:

## 2024-12-08 NOTE — PROGRESS NOTE ADULT - ASSESSMENT
68 y/o M with pmh of CHF (EF 26%), atrial flutter on Eliquis s/p DCCV 11/2024, vertigo p/w dizziness and hypotension likely 2/2 medications. Now admitted to medicine for further management.

## 2024-12-08 NOTE — H&P ADULT - NSHPLABSRESULTS_GEN_ALL_CORE
15.8   10.84 )-----------( 298      ( 07 Dec 2024 18:08 )             49.8     138  |  103  |  28[H]  ----------------------------<  89     12-07  4.6   |  23  |  1.67[H]    Ca    9.2      07 Dec 2024 18:08    TPro  7.3  /  Alb  3.8  /  TBili  0.2  /  DBili  x   /  AST  18  /  ALT  17  /  AlkPhos  108  12-07    PT/INR: 12.4/1.07 (12-07-24 @ 18:08)  PTT: 34.8 (12-07-24 @ 18:08)              hs Troponin, T - 8 ng/L (12-07-24 @ 21:06)  hs Troponin, T - 10 ng/L (12-07-24 @ 18:08)      Pro-BNP: 111 (12-07-24 @ 18:08)              EKG reviewed showing sinus bradycardia         CXR reviewd: clear lungs

## 2024-12-08 NOTE — H&P ADULT - NSHPPHYSICALEXAM_GEN_ALL_CORE
Vital Signs Last 24 Hrs  T(C): 37.2 (07 Dec 2024 23:05), Max: 37.2 (07 Dec 2024 23:05)  T(F): 98.9 (07 Dec 2024 23:05), Max: 98.9 (07 Dec 2024 23:05)  HR: 110 (07 Dec 2024 23:05) (53 - 110)  BP: 109/66 (07 Dec 2024 23:05) (83/58 - 110/60)  BP(mean): 84 (07 Dec 2024 22:06) (84 - 84)  RR: 17 (07 Dec 2024 23:05) (15 - 18)  SpO2: 98% (07 Dec 2024 23:05) (97% - 99%)    Parameters below as of 07 Dec 2024 23:05  Patient On (Oxygen Delivery Method): room air        PHYSICAL EXAM:  GENERAL: No Acute Distress  EYES: conjunctiva and sclera clear  ENMT: Moist mucous membranes   NECK: enlarged anterior cervical LN on L, nontender, firm  PULMONARY: Clear to auscultation bilaterally  CARDIAC: Regular rate and rhythm; No murmurs, rubs, or gallops  GASTROINTESTINAL: Abdomen soft, Nontender, Nondistended; Bowel sounds normal  EXTREMITIES:   No clubbing, cyanosis, or pedal edema  PSYCH: Normal Affect, Normal Behavior  NEUROLOGY:   - Mental status A&O x 3  SKIN: No rashes or lesions  MUSCULOSKELETAL: No joint swelling

## 2024-12-08 NOTE — H&P ADULT - PROBLEM SELECTOR PLAN 4
- holding Entresto and furosemide as above   - will f/u cardiology recs regarding re-introducing GDMT

## 2024-12-08 NOTE — H&P ADULT - NSHPPOAPULMEMBOLUS_GEN_A_CORE
Dr. Conner at bedside  For sve, 3. FHT reviewed at bedside, consented patient for  at 1937 for fetal intolerance of labor and failure to progress.    no

## 2024-12-08 NOTE — H&P ADULT - PROBLEM SELECTOR PLAN 1
Likely 2/2 medication effects, overdiuresis, and possible chronotropic incompetence.    - will check orthostatic VS  - hold Entresto, Lasix.  - reviewed cardiology consult

## 2024-12-08 NOTE — H&P ADULT - ASSESSMENT
70 y/o M with pmh of CHF (EF 26%), atrial flutter on Eliquis s/p DCCV 11/2024, vertigo p/w dizziness and hypotension likely 2/2 medications.

## 2024-12-08 NOTE — H&P ADULT - HISTORY OF PRESENT ILLNESS
70 y/o M with pmh of CHF (EF 26%), atrial flutter on Eliquis s/p DCCV 11/2024, vertigo p/w dizziness and hypotension.  Pt reports over the past several days, he has felt lightheaded, especially upon standing up and walking.  Symptoms were worse yesterday, and pt had to sit himself on the floor because he felt he would lose consciousness.  Pt called his daughter who checked his BP which showed systolic in 60's.  Pt has not had chest pain, palpitations, dyspnea, or edema.  He has not had fever, chills, cough. Pt reports insomnia since recent admission in November.  He also has noticed enlarged lymph node in L side of neck for the past several weeks.      In the ED, pt had BP of 83/58 and bradycardia to 50's.

## 2024-12-08 NOTE — H&P ADULT - NSHPREVIEWOFSYSTEMS_GEN_ALL_CORE
Review of Systems:   CONSTITUTIONAL: No fever or chills  EYES: No eye pain, visual disturbances, or discharge  ENMT:  No difficulty hearing, no throat pain  NECK: Swollen lymph nodes for several weeks.   RESPIRATORY: No cough, No shortness of breath  CARDIOVASCULAR: No chest pain, palpitations, dizziness, or leg swelling  GASTROINTESTINAL: No abdominal pain, nausea, vomiting or diarrhea  GENITOURINARY: No dysuria, or hematuria  NEUROLOGICAL: lightheadedness as above  SKIN: No rashes, or lesions   LYMPH NODES: No enlarged glands  ENDOCRINE: No heat or cold intolerance  MUSCULOSKELETAL: No joint pain or swelling  PSYCHIATRIC: No depression or anxiety  HEME/LYMPH: No easy bruising, or bleeding  ALLERGY AND IMMUNOLOGIC: No hives or eczema

## 2024-12-09 LAB
ALBUMIN SERPL ELPH-MCNC: 3.5 G/DL — SIGNIFICANT CHANGE UP (ref 3.3–5)
ALP SERPL-CCNC: 89 U/L — SIGNIFICANT CHANGE UP (ref 40–120)
ALT FLD-CCNC: 14 U/L — SIGNIFICANT CHANGE UP (ref 4–41)
ANION GAP SERPL CALC-SCNC: 11 MMOL/L — SIGNIFICANT CHANGE UP (ref 7–14)
AST SERPL-CCNC: 15 U/L — SIGNIFICANT CHANGE UP (ref 4–40)
BILIRUB SERPL-MCNC: 0.6 MG/DL — SIGNIFICANT CHANGE UP (ref 0.2–1.2)
BLD GP AB SCN SERPL QL: NEGATIVE — SIGNIFICANT CHANGE UP
BUN SERPL-MCNC: 29 MG/DL — HIGH (ref 7–23)
CALCIUM SERPL-MCNC: 9.1 MG/DL — SIGNIFICANT CHANGE UP (ref 8.4–10.5)
CHLORIDE SERPL-SCNC: 102 MMOL/L — SIGNIFICANT CHANGE UP (ref 98–107)
CO2 SERPL-SCNC: 22 MMOL/L — SIGNIFICANT CHANGE UP (ref 22–31)
CREAT SERPL-MCNC: 1.28 MG/DL — SIGNIFICANT CHANGE UP (ref 0.5–1.3)
EGFR: 60 ML/MIN/1.73M2 — SIGNIFICANT CHANGE UP
GLUCOSE SERPL-MCNC: 80 MG/DL — SIGNIFICANT CHANGE UP (ref 70–99)
HCT VFR BLD CALC: 45.1 % — SIGNIFICANT CHANGE UP (ref 39–50)
HGB BLD-MCNC: 14.9 G/DL — SIGNIFICANT CHANGE UP (ref 13–17)
MAGNESIUM SERPL-MCNC: 2 MG/DL — SIGNIFICANT CHANGE UP (ref 1.6–2.6)
MCHC RBC-ENTMCNC: 26.4 PG — LOW (ref 27–34)
MCHC RBC-ENTMCNC: 33 G/DL — SIGNIFICANT CHANGE UP (ref 32–36)
MCV RBC AUTO: 80 FL — SIGNIFICANT CHANGE UP (ref 80–100)
NRBC # BLD: 0 /100 WBCS — SIGNIFICANT CHANGE UP (ref 0–0)
NRBC # FLD: 0 K/UL — SIGNIFICANT CHANGE UP (ref 0–0)
PHOSPHATE SERPL-MCNC: 3.9 MG/DL — SIGNIFICANT CHANGE UP (ref 2.5–4.5)
PLATELET # BLD AUTO: 258 K/UL — SIGNIFICANT CHANGE UP (ref 150–400)
POTASSIUM SERPL-MCNC: 4 MMOL/L — SIGNIFICANT CHANGE UP (ref 3.5–5.3)
POTASSIUM SERPL-SCNC: 4 MMOL/L — SIGNIFICANT CHANGE UP (ref 3.5–5.3)
PROT SERPL-MCNC: 6.5 G/DL — SIGNIFICANT CHANGE UP (ref 6–8.3)
RBC # BLD: 5.64 M/UL — SIGNIFICANT CHANGE UP (ref 4.2–5.8)
RBC # FLD: 13.3 % — SIGNIFICANT CHANGE UP (ref 10.3–14.5)
RH IG SCN BLD-IMP: POSITIVE — SIGNIFICANT CHANGE UP
SODIUM SERPL-SCNC: 135 MMOL/L — SIGNIFICANT CHANGE UP (ref 135–145)
WBC # BLD: 6.55 K/UL — SIGNIFICANT CHANGE UP (ref 3.8–10.5)
WBC # FLD AUTO: 6.55 K/UL — SIGNIFICANT CHANGE UP (ref 3.8–10.5)

## 2024-12-09 PROCEDURE — 76536 US EXAM OF HEAD AND NECK: CPT | Mod: 26,76

## 2024-12-09 PROCEDURE — 99233 SBSQ HOSP IP/OBS HIGH 50: CPT

## 2024-12-09 PROCEDURE — 76536 US EXAM OF HEAD AND NECK: CPT | Mod: 26

## 2024-12-09 RX ORDER — METOPROLOL TARTRATE 100 MG/1
12.5 TABLET, FILM COATED ORAL EVERY 12 HOURS
Refills: 0 | Status: DISCONTINUED | OUTPATIENT
Start: 2024-12-09 | End: 2024-12-11

## 2024-12-09 RX ADMIN — ACETAMINOPHEN, DIPHENHYDRAMINE HCL, PHENYLEPHRINE HCL 3 MILLIGRAM(S): 325; 25; 5 TABLET ORAL at 21:09

## 2024-12-09 RX ADMIN — DAPAGLIFLOZIN 10 MILLIGRAM(S): 10 TABLET, FILM COATED ORAL at 11:24

## 2024-12-09 RX ADMIN — METOPROLOL TARTRATE 12.5 MILLIGRAM(S): 100 TABLET, FILM COATED ORAL at 17:01

## 2024-12-09 RX ADMIN — APIXABAN 5 MILLIGRAM(S): 2.5 TABLET, FILM COATED ORAL at 17:00

## 2024-12-09 RX ADMIN — APIXABAN 5 MILLIGRAM(S): 2.5 TABLET, FILM COATED ORAL at 05:22

## 2024-12-09 RX ADMIN — AMIODARONE HYDROCHLORIDE 200 MILLIGRAM(S): 200 TABLET ORAL at 11:24

## 2024-12-09 NOTE — PROGRESS NOTE ADULT - ASSESSMENT
70y/o M w PMH of HFrEF w EF 26% per echo 11/24, AFlutter on Eliquis s/p DCCV 11/2024, vertigo, presenting to ED due to dizziness and hypotension at home for which cardiology was consulted.     Pt says he had dizziness w lightheadedness earlier todat at home and felt the symptoms progressively got worse and not relieved w rest. He felt symptoms were not similar to his usual vertigo. He checked BP at home and noticed SBP was in 60s and thus presented to ED. In ED, pts's BP has been in sin SBP 80-100s. Orthostatic vitals x 1 neg. Trop, pBNP x 1 neg. CXR unremarkable. Pt has been compliant w his GDMT at home and no recent changes to his meds. Pt says he has been feeling lacking energy at home recently and occasionally chest tightness that relives in few mins. Pt has not noticed any weight gain or decreased urine output.  He has been maintaining adequate fluid intake at home.     Assessment and Plan:  # Dizziness/hypotension  Pt feels dizziness w lightheadedness different from his usual vertigo.   SBP in 60s at home. Orthostatic vitals x 1 neg. EKG w no acute ischemic changes, Trop, pBNP x 1 neg. CXR unremarkable.   Pt does not appear volume overloaded. Low suspicion for cardiogenic shock or ACS.    - continue to monitor on tele  - can restart home Toprol 12.5mg BID  - EP to see for possible inpatient CTI ablation- please hold farxiga in anticipation  - will hold home entresto, spironolactone for now in anticipation of possible ablation  - continue home amio, eliquis  - repeat orthostatics 70y/o M w PMH of HFrEF w EF 26% per echo 11/24, AFlutter on Eliquis s/p DCCV 11/2024, vertigo, presenting to ED due to dizziness and hypotension at home for which cardiology was consulted.     Pt says he had dizziness w lightheadedness earlier today at home and felt the symptoms progressively got worse and not relieved w rest. He felt symptoms were not similar to his usual vertigo. He checked BP at home and noticed SBP was in 60s and thus presented to ED. In ED, pts's BP has been in sin SBP 80-100s. Orthostatic vitals x 1 neg. Trop, pBNP x 1 neg. CXR unremarkable. Pt has been compliant w his GDMT at home and no recent changes to his meds. Pt says he has been feeling lacking energy at home recently and occasionally chest tightness that relives in few mins. Pt has not noticed any weight gain or decreased urine output.  He has been maintaining adequate fluid intake at home.     Assessment and Plan:  # Dizziness/hypotension  Pt feels dizziness w lightheadedness different from his usual vertigo.   SBP in 60s at home. Orthostatic vitals x 1 neg. EKG w no acute ischemic changes, Trop, pBNP x 1 neg. CXR unremarkable.   Pt does not appear volume overloaded. Low suspicion for cardiogenic shock or ACS.    - continue to monitor on tele  - can restart home Toprol 12.5mg BID  - EP to see for possible inpatient CTI ablation- please hold farxiga in anticipation  - will hold home entresto, spironolactone for now in anticipation of possible ablation  - continue home amio, eliquis  - repeat orthostatics 70y/o M w PMH of HFrEF w EF 26% per echo 11/24, AFlutter on Eliquis s/p DCCV 11/2024, vertigo, presenting to ED due to dizziness and hypotension at home for which cardiology was consulted.     Pt says he had dizziness w lightheadedness earlier today at home and felt the symptoms progressively got worse and not relieved w rest. He felt symptoms were not similar to his usual vertigo. He checked BP at home and noticed SBP was in 60s and thus presented to ED. In ED, pts's BP has been in sin SBP 80-100s. Orthostatic vitals x 1 neg. Trop, pBNP x 1 neg. CXR unremarkable. Pt has been compliant w his GDMT at home and no recent changes to his meds. Pt says he has been feeling lacking energy at home recently and occasionally chest tightness that relives in few mins. Pt has not noticed any weight gain or decreased urine output.  He has been maintaining adequate fluid intake at home.     Assessment and Plan:  # Dizziness/hypotension  Pt feels dizziness w lightheadedness different from his usual vertigo.   SBP in 60s at home. Orthostatic vitals x 1 neg. EKG w no acute ischemic changes, Trop, pBNP x 1 neg. CXR unremarkable.   Pt does not appear volume overloaded. Low suspicion for cardiogenic shock or ACS.    - continue to monitor on tele  - can restart home Toprol 12.5mg BID  - EP to see for possible inpatient CTI ablation- please hold farxiga in anticipation  - will hold home entresto, spironolactone for now in anticipation of possible ablation  - continue home eliquis  - hold home amio per EP  - repeat orthostatics

## 2024-12-09 NOTE — CONSULT NOTE ADULT - ASSESSMENT
70 y/o M with pmh of CHF (EF 26%), atrial flutter on Eliquis s/p DCCV 11/2024, vertigo p/w dizziness and hypotension when getting up from seated position. C/W orthostatic hypotension although initial orthostatics negative. EP consulted as pt scheduled for CTI ablation and ILR implantation with dr. Quintanilla this week.    Recommendations:  -CTM on tele  -entresto, lasix on hold per general cardiology given pt symptoms and borderline BPs.   -Hold farxiga for now in anticipation of OR. Please obtain CMP and Beta hydroxybutyrate in AM in addition to coags and type and screen  -C/W eliquis BID and amiodarone. OK to restart low dose beta blocker  -When safe please walk pt and obtain HR while ambulating to assess for chronotropic response.

## 2024-12-09 NOTE — CONSULT NOTE ADULT - SUBJECTIVE AND OBJECTIVE BOX
Cardiology Consult Note   [Please check [email or web address] password: [email or web address]" for cardiology service schedule and contact information]    HPI:  68 y/o M with pmh of CHF (EF 26%), atrial flutter on Eliquis s/p DCCV 11/2024, vertigo p/w dizziness and hypotension.  Pt reports over the past several days, he has felt lightheaded, especially upon standing up and walking.  Symptoms were worse yesterday, and pt had to sit himself on the floor because he felt he would lose consciousness.  Pt called his daughter who checked his BP which showed systolic in 60's.  Pt has not had chest pain, palpitations, dyspnea, or edema.  He has not had fever, chills, cough. Pt reports insomnia since recent admission in November.  He also has noticed enlarged lymph node in L side of neck for the past several weeks.      In the ED, pt had BP of 83/58 and bradycardia to 50's.   (08 Dec 2024 04:12)    presented on 11/10/24 with 2 weeks of intermittent chest pain and SOB. He was found to be in acute decompensated heart failure with atrial flutter with 2:1 AV block. He underwent a GERALDINE which revealed no thrombus and is s/p DCCV 11/12/24     Since admission, pt has been followed by general cardiology with GDMT held (entresto, metoprolol, lasix). ECG with sinus bradycardia. Tele initially with sinus joon to 50s-60s now improving to 60s-70s. Labs unremarkable including negative lactate and normal TSH/FT4. SBPs initially in 80s-90s now 90s-100s with MAPs >65. Orthostatics were negative. Pt notes still feeling dizzy when getting up from laying down. He notes this feels a little different from his usual vertigo symptoms. He does not normally take his BPs at home, but on chart review they often range in 90s/60s outpatient. He currently denies chest pain or SOB.    Tele:       PAST MEDICAL & SURGICAL HISTORY:  No pertinent past medical history      No significant past surgical history        FAMILY HISTORY:  No pertinent family history in first degree relatives      SOCIAL HISTORY:  unchanged    MEDICATIONS:  aMIOdarone    Tablet 200 milliGRAM(s) Oral daily  apixaban 5 milliGRAM(s) Oral every 12 hours        melatonin 3 milliGRAM(s) Oral at bedtime    famotidine    Tablet 20 milliGRAM(s) Oral daily    dapagliflozin 10 milliGRAM(s) Oral daily    influenza  Vaccine (HIGH DOSE) 0.5 milliLiter(s) IntraMuscular once        -------------------------------------------------------------------------------------------  PHYSICAL EXAM:  T(C): 36.9 (12-09-24 @ 05:36), Max: 36.9 (12-09-24 @ 05:36)  HR: 57 (12-09-24 @ 05:36) (53 - 67)  BP: 95/59 (12-09-24 @ 05:36) (93/57 - 103/63)  RR: 16 (12-09-24 @ 05:36) (16 - 18)  SpO2: 96% (12-09-24 @ 05:36) (95% - 98%)  Wt(kg): --  I&O's Summary    08 Dec 2024 07:01  -  09 Dec 2024 07:00  --------------------------------------------------------  IN: 1030 mL / OUT: 0 mL / NET: 1030 mL    09 Dec 2024 07:01  -  09 Dec 2024 11:56  --------------------------------------------------------  IN: 200 mL / OUT: 0 mL / NET: 200 mL        GENERAL: NAD  HEAD: Atraumatic, Normocephalic.  ENT: Moist mucous membranes.  NECK: Supple, No JVD.  CHEST/LUNG: Clear to auscultation bilaterally; No rales, rhonchi, wheezing, or rubs. Unlabored respirations.  HEART: Regular rate and rhythm; No murmurs, rubs, or gallops.  ABDOMEN: Bowel sounds present; Soft, Nontender, Nondistended.   EXTREMITIES:  2+ Peripheral Pulses, brisk capillary refill. No clubbing, cyanosis, or edema.    -------------------------------------------------------------------------------------------  LABS:                          14.9   6.55  )-----------( 258      ( 09 Dec 2024 06:00 )             45.1     12-09    135  |  102  |  29[H]  ----------------------------<  80  4.0   |  22  |  1.28    Ca    9.1      09 Dec 2024 06:00  Phos  3.9     12-09  Mg     2.00     12-09    TPro  6.5  /  Alb  3.5  /  TBili  0.6  /  DBili  x   /  AST  15  /  ALT  14  /  AlkPhos  89  12-09    PT/INR - ( 07 Dec 2024 18:08 )   PT: 12.4 sec;   INR: 1.07 ratio         PTT - ( 07 Dec 2024 18:08 )  PTT:34.8 sec  CARDIAC MARKERS ( 07 Dec 2024 21:06 )  8 ng/L / x     / x     / x     / x     / x      CARDIAC MARKERS ( 07 Dec 2024 18:08 )  10 ng/L / x     / x     / x     / x     / x              melatonin 3 milliGRAM(s) Oral at bedtime      -------------------------------------------------------------------------------------------  Cardiovascular Diagnostic Testing:    ECG:     Echo:     Stress Testing:    Cath:    -------------------------------------------------------------------------------------------                 Cardiology Consult Note   [Please check [email or web address] password: [email or web address]" for cardiology service schedule and contact information]    HPI:  70 y/o M with pmh of CHF (EF 26%), atrial flutter on Eliquis s/p DCCV 11/2024, vertigo p/w dizziness and hypotension.  Pt reports over the past several days, he has felt lightheaded, especially upon standing up and walking.  Symptoms were worse yesterday, and pt had to sit himself on the floor because he felt he would lose consciousness.  Pt called his daughter who checked his BP which showed systolic in 60's.  Pt has not had chest pain, palpitations, dyspnea, or edema.  He has not had fever, chills, cough. Pt reports insomnia since recent admission in November.  He also has noticed enlarged lymph node in L side of neck for the past several weeks.      In the ED, pt had BP of 83/58 and bradycardia to 50's.   (08 Dec 2024 04:12)    presented on 11/10/24 with 2 weeks of intermittent chest pain and SOB. He was found to be in acute decompensated heart failure with atrial flutter with 2:1 AV block. He underwent a GERALDINE which revealed no thrombus and is s/p DCCV 11/12/24     Since admission, pt has been followed by general cardiology with GDMT held (entresto, metoprolol, lasix). ECG with sinus bradycardia. Tele initially with sinus joon to 50s-60s now improving to 60s-70s. Labs unremarkable including negative lactate and normal TSH/FT4. SBPs initially in 80s-90s now 90s-100s with MAPs >65. Orthostatics were negative. Pt notes still feeling dizzy when getting up from laying down. He notes this feels a little different from his usual vertigo symptoms. He does not normally take his BPs at home, but on chart review they often range in 90s/60s outpatient. He currently denies chest pain or SOB. Pt has been taking all medications as prescribed at home including his eliquis    Tele: Sinus/sinus joon      PAST MEDICAL & SURGICAL HISTORY:  No pertinent past medical history      No significant past surgical history        FAMILY HISTORY:  No pertinent family history in first degree relatives      SOCIAL HISTORY:  unchanged    MEDICATIONS:  aMIOdarone    Tablet 200 milliGRAM(s) Oral daily  apixaban 5 milliGRAM(s) Oral every 12 hours        melatonin 3 milliGRAM(s) Oral at bedtime    famotidine    Tablet 20 milliGRAM(s) Oral daily    dapagliflozin 10 milliGRAM(s) Oral daily    influenza  Vaccine (HIGH DOSE) 0.5 milliLiter(s) IntraMuscular once        -------------------------------------------------------------------------------------------  PHYSICAL EXAM:  T(C): 36.9 (12-09-24 @ 05:36), Max: 36.9 (12-09-24 @ 05:36)  HR: 57 (12-09-24 @ 05:36) (53 - 67)  BP: 95/59 (12-09-24 @ 05:36) (93/57 - 103/63)  RR: 16 (12-09-24 @ 05:36) (16 - 18)  SpO2: 96% (12-09-24 @ 05:36) (95% - 98%)  Wt(kg): --  I&O's Summary    08 Dec 2024 07:01  -  09 Dec 2024 07:00  --------------------------------------------------------  IN: 1030 mL / OUT: 0 mL / NET: 1030 mL    09 Dec 2024 07:01  -  09 Dec 2024 11:56  --------------------------------------------------------  IN: 200 mL / OUT: 0 mL / NET: 200 mL        GENERAL: NAD  HEAD: Atraumatic, Normocephalic.  ENT: Moist mucous membranes.  NECK: Supple, No JVD.  CHEST/LUNG: Clear to auscultation bilaterally; No rales, rhonchi, wheezing, or rubs. Unlabored respirations.  HEART: Regular rate and rhythm; No murmurs, rubs, or gallops.  ABDOMEN: Bowel sounds present; Soft, Nontender, Nondistended.   EXTREMITIES:  2+ Peripheral Pulses, brisk capillary refill. No clubbing, cyanosis, or edema.    -------------------------------------------------------------------------------------------  LABS:                          14.9   6.55  )-----------( 258      ( 09 Dec 2024 06:00 )             45.1     12-09    135  |  102  |  29[H]  ----------------------------<  80  4.0   |  22  |  1.28    Ca    9.1      09 Dec 2024 06:00  Phos  3.9     12-09  Mg     2.00     12-09    TPro  6.5  /  Alb  3.5  /  TBili  0.6  /  DBili  x   /  AST  15  /  ALT  14  /  AlkPhos  89  12-09    PT/INR - ( 07 Dec 2024 18:08 )   PT: 12.4 sec;   INR: 1.07 ratio         PTT - ( 07 Dec 2024 18:08 )  PTT:34.8 sec  CARDIAC MARKERS ( 07 Dec 2024 21:06 )  8 ng/L / x     / x     / x     / x     / x      CARDIAC MARKERS ( 07 Dec 2024 18:08 )  10 ng/L / x     / x     / x     / x     / x              melatonin 3 milliGRAM(s) Oral at bedtime      -------------------------------------------------------------------------------------------  Cardiovascular Diagnostic Testing:    ECG:     Echo:     Stress Testing:    Cath:    -------------------------------------------------------------------------------------------

## 2024-12-09 NOTE — PROGRESS NOTE ADULT - SUBJECTIVE AND OBJECTIVE BOX
Patient is a 69y old  Male who presents with a chief complaint of dizziness, low BP (09 Dec 2024 12:00)    SUBJECTIVE / OVERNIGHT EVENTS: No acute events. Still with some dizziness. No chest pain. Reports ongoing sensation of difficulty swallowing- especially with liquids when drinking quickly, denies prior evaluation- pending swallow evaluation here.     MEDICATIONS  (STANDING):  apixaban 5 milliGRAM(s) Oral every 12 hours  famotidine    Tablet 20 milliGRAM(s) Oral daily  influenza  Vaccine (HIGH DOSE) 0.5 milliLiter(s) IntraMuscular once  melatonin 3 milliGRAM(s) Oral at bedtime  metoprolol tartrate 12.5 milliGRAM(s) Oral every 12 hours    MEDICATIONS  (PRN):      CAPILLARY BLOOD GLUCOSE        I&O's Summary    08 Dec 2024 07:01  -  09 Dec 2024 07:00  --------------------------------------------------------  IN: 1030 mL / OUT: 0 mL / NET: 1030 mL    09 Dec 2024 07:01  -  09 Dec 2024 14:32  --------------------------------------------------------  IN: 640 mL / OUT: 0 mL / NET: 640 mL        PHYSICAL EXAM:  Vital Signs Last 24 Hrs  T(C): 36.3 (09 Dec 2024 11:36), Max: 36.9 (09 Dec 2024 05:36)  T(F): 97.4 (09 Dec 2024 11:36), Max: 98.4 (09 Dec 2024 05:36)  HR: 63 (09 Dec 2024 11:36) (53 - 67)  BP: 104/57 (09 Dec 2024 11:36) (93/57 - 104/57)  BP(mean): 71 (09 Dec 2024 05:36) (67 - 71)  RR: 16 (09 Dec 2024 11:36) (16 - 18)  SpO2: 95% (09 Dec 2024 11:36) (95% - 98%)    Parameters below as of 09 Dec 2024 11:36  Patient On (Oxygen Delivery Method): room air    CONSTITUTIONAL: NAD, laying in bed  EYES: conjunctiva and sclera clear  ENMT: Moist oral mucosa  RESPIRATORY: Normal respiratory effort; lungs are clear to auscultation bilaterally  CARDIOVASCULAR: Regular rate and rhythm, normal S1 and S2, No lower extremity edema  ABDOMEN: Nontender to palpation, normoactive bowel sounds, no rebound/guarding  PSYCH: calm  LYMPH: left cervical enlarged lymph node, nontender    LABS:                        14.9   6.55  )-----------( 258      ( 09 Dec 2024 06:00 )             45.1     12-09    135  |  102  |  29[H]  ----------------------------<  80  4.0   |  22  |  1.28    Ca    9.1      09 Dec 2024 06:00  Phos  3.9     12-09  Mg     2.00     12-09    TPro  6.5  /  Alb  3.5  /  TBili  0.6  /  DBili  x   /  AST  15  /  ALT  14  /  AlkPhos  89  12-09    PT/INR - ( 07 Dec 2024 18:08 )   PT: 12.4 sec;   INR: 1.07 ratio       PTT - ( 07 Dec 2024 18:08 )  PTT:34.8 sec    Urinalysis Basic - ( 09 Dec 2024 06:00 )    Color: x / Appearance: x / SG: x / pH: x  Gluc: 80 mg/dL / Ketone: x  / Bili: x / Urobili: x   Blood: x / Protein: x / Nitrite: x   Leuk Esterase: x / RBC: x / WBC x   Sq Epi: x / Non Sq Epi: x / Bacteria: x    RADIOLOGY & ADDITIONAL TESTS: Reviewed    COORDINATION OF CARE:  Care Discussed with Consultants/Other Providers [Y- Medicine ACP, CM, SW]

## 2024-12-09 NOTE — CONSULT NOTE ADULT - ATTENDING COMMENTS
70 y/o M with pmh of CHF (EF 26%), atrial flutter on Eliquis s/p DCCV 11/2024, vertigo p/w dizziness and hypotension. He is scheduled for a CTI ablation tomorrow with ILR placement. His dizziness is orthostatic. Will plan to proceed with ablation tomorrow.

## 2024-12-09 NOTE — PROGRESS NOTE ADULT - SUBJECTIVE AND OBJECTIVE BOX
Cardiology Progress Note  ------------------------------------------------------------------------------------------  SUBJECTIVE:   - Tele: NSR  - No events overnight. still feels some vertigo symptoms but pressures improved  -------------------------------------------------------------------------------------------    VS:  T(F): 98.4 (12-09), Max: 98.4 (12-09)  HR: 57 (12-09) (53 - 67)  BP: 95/59 (12-09) (93/57 - 103/63)  RR: 16 (12-09)  SpO2: 96% (12-09)  I&O's Summary    08 Dec 2024 07:01  -  09 Dec 2024 07:00  --------------------------------------------------------  IN: 1030 mL / OUT: 0 mL / NET: 1030 mL    09 Dec 2024 07:01  -  09 Dec 2024 12:00  --------------------------------------------------------  IN: 200 mL / OUT: 0 mL / NET: 200 mL      PHYSICAL EXAM:  GENERAL: NAD  HEAD:  Atraumatic, Normocephalic.  EYES: EOMI, PERRLA, conjunctiva and sclera clear.  ENT: Moist mucous membranes.  NECK: Supple, No JVD.  CHEST/LUNG: Clear to auscultation bilaterally; No rales, rhonchi, wheezing, or rubs. Unlabored respirations.  HEART: Regular rate and rhythm; No murmurs, rubs, or gallops.  ABDOMEN: Bowel sounds present; Soft, Nontender, Nondistended.   EXTREMITIES: No edema. 2+ Peripheral Pulses, brisk capillary refill. No clubbing or cyanosis.   PSYCH: Normal affect.  SKIN: No rashes or lesions.  -------------------------------------------------------------------------------------------  LABS:                          14.9   6.55  )-----------( 258      ( 09 Dec 2024 06:00 )             45.1     12-09    135  |  102  |  29[H]  ----------------------------<  80  4.0   |  22  |  1.28    Ca    9.1      09 Dec 2024 06:00  Phos  3.9     12-09  Mg     2.00     12-09    TPro  6.5  /  Alb  3.5  /  TBili  0.6  /  DBili  x   /  AST  15  /  ALT  14  /  AlkPhos  89  12-09    PT/INR - ( 07 Dec 2024 18:08 )   PT: 12.4 sec;   INR: 1.07 ratio         PTT - ( 07 Dec 2024 18:08 )  PTT:34.8 sec  CARDIAC MARKERS ( 07 Dec 2024 21:06 )  8 ng/L / x     / x     / x     / x     / x      CARDIAC MARKERS ( 07 Dec 2024 18:08 )  10 ng/L / x     / x     / x     / x     / x                -------------------------------------------------------------------------------------------  Meds:  aMIOdarone    Tablet 200 milliGRAM(s) Oral daily  apixaban 5 milliGRAM(s) Oral every 12 hours  dapagliflozin 10 milliGRAM(s) Oral daily  famotidine    Tablet 20 milliGRAM(s) Oral daily  influenza  Vaccine (HIGH DOSE) 0.5 milliLiter(s) IntraMuscular once  melatonin 3 milliGRAM(s) Oral at bedtime    -------------------------------------------------------------------------------------------

## 2024-12-09 NOTE — PROGRESS NOTE ADULT - ASSESSMENT
70 y/o M with pmh of CHF (EF 26%), atrial flutter on Eliquis s/p DCCV 11/2024, vertigo p/w dizziness and hypotension likely 2/2 medications. Now admitted to medicine for further management.

## 2024-12-10 PROBLEM — Z78.9 NO HISTORY OF ALCOHOL USE: Status: ACTIVE | Noted: 2024-12-10

## 2024-12-10 PROBLEM — Z78.9 DOES NOT USE ILLICIT DRUGS: Status: ACTIVE | Noted: 2024-12-10

## 2024-12-10 PROBLEM — Z78.9 NEVER SMOKED CIGARETTES: Status: ACTIVE | Noted: 2024-12-10

## 2024-12-10 LAB
ALBUMIN SERPL ELPH-MCNC: 3.5 G/DL — SIGNIFICANT CHANGE UP (ref 3.3–5)
ALP SERPL-CCNC: 94 U/L — SIGNIFICANT CHANGE UP (ref 40–120)
ALT FLD-CCNC: 17 U/L — SIGNIFICANT CHANGE UP (ref 4–41)
ANION GAP SERPL CALC-SCNC: 10 MMOL/L — SIGNIFICANT CHANGE UP (ref 7–14)
ANION GAP SERPL CALC-SCNC: 12 MMOL/L — SIGNIFICANT CHANGE UP (ref 7–14)
AST SERPL-CCNC: 15 U/L — SIGNIFICANT CHANGE UP (ref 4–40)
B-OH-BUTYR SERPL-SCNC: <0 MMOL/L — SIGNIFICANT CHANGE UP (ref 0–0.4)
BILIRUB SERPL-MCNC: 0.2 MG/DL — SIGNIFICANT CHANGE UP (ref 0.2–1.2)
BUN SERPL-MCNC: 26 MG/DL — HIGH (ref 7–23)
BUN SERPL-MCNC: 27 MG/DL — HIGH (ref 7–23)
CALCIUM SERPL-MCNC: 9 MG/DL — SIGNIFICANT CHANGE UP (ref 8.4–10.5)
CALCIUM SERPL-MCNC: 9.1 MG/DL — SIGNIFICANT CHANGE UP (ref 8.4–10.5)
CHLORIDE SERPL-SCNC: 103 MMOL/L — SIGNIFICANT CHANGE UP (ref 98–107)
CHLORIDE SERPL-SCNC: 105 MMOL/L — SIGNIFICANT CHANGE UP (ref 98–107)
CO2 SERPL-SCNC: 22 MMOL/L — SIGNIFICANT CHANGE UP (ref 22–31)
CO2 SERPL-SCNC: 23 MMOL/L — SIGNIFICANT CHANGE UP (ref 22–31)
CREAT SERPL-MCNC: 1.23 MG/DL — SIGNIFICANT CHANGE UP (ref 0.5–1.3)
CREAT SERPL-MCNC: 1.26 MG/DL — SIGNIFICANT CHANGE UP (ref 0.5–1.3)
EGFR: 61 ML/MIN/1.73M2 — SIGNIFICANT CHANGE UP
EGFR: 63 ML/MIN/1.73M2 — SIGNIFICANT CHANGE UP
GLUCOSE SERPL-MCNC: 97 MG/DL — SIGNIFICANT CHANGE UP (ref 70–99)
GLUCOSE SERPL-MCNC: 99 MG/DL — SIGNIFICANT CHANGE UP (ref 70–99)
HCT VFR BLD CALC: 45.5 % — SIGNIFICANT CHANGE UP (ref 39–50)
HGB BLD-MCNC: 14.5 G/DL — SIGNIFICANT CHANGE UP (ref 13–17)
MAGNESIUM SERPL-MCNC: 2 MG/DL — SIGNIFICANT CHANGE UP (ref 1.6–2.6)
MCHC RBC-ENTMCNC: 25.9 PG — LOW (ref 27–34)
MCHC RBC-ENTMCNC: 31.9 G/DL — LOW (ref 32–36)
MCV RBC AUTO: 81.4 FL — SIGNIFICANT CHANGE UP (ref 80–100)
NRBC # BLD: 0 /100 WBCS — SIGNIFICANT CHANGE UP (ref 0–0)
NRBC # FLD: 0 K/UL — SIGNIFICANT CHANGE UP (ref 0–0)
PHOSPHATE SERPL-MCNC: 4 MG/DL — SIGNIFICANT CHANGE UP (ref 2.5–4.5)
PLATELET # BLD AUTO: 231 K/UL — SIGNIFICANT CHANGE UP (ref 150–400)
POTASSIUM SERPL-MCNC: 4.2 MMOL/L — SIGNIFICANT CHANGE UP (ref 3.5–5.3)
POTASSIUM SERPL-MCNC: 4.2 MMOL/L — SIGNIFICANT CHANGE UP (ref 3.5–5.3)
POTASSIUM SERPL-SCNC: 4.2 MMOL/L — SIGNIFICANT CHANGE UP (ref 3.5–5.3)
POTASSIUM SERPL-SCNC: 4.2 MMOL/L — SIGNIFICANT CHANGE UP (ref 3.5–5.3)
PROT SERPL-MCNC: 6.6 G/DL — SIGNIFICANT CHANGE UP (ref 6–8.3)
RBC # BLD: 5.59 M/UL — SIGNIFICANT CHANGE UP (ref 4.2–5.8)
RBC # FLD: 13.4 % — SIGNIFICANT CHANGE UP (ref 10.3–14.5)
SODIUM SERPL-SCNC: 137 MMOL/L — SIGNIFICANT CHANGE UP (ref 135–145)
SODIUM SERPL-SCNC: 138 MMOL/L — SIGNIFICANT CHANGE UP (ref 135–145)
WBC # BLD: 6.03 K/UL — SIGNIFICANT CHANGE UP (ref 3.8–10.5)
WBC # FLD AUTO: 6.03 K/UL — SIGNIFICANT CHANGE UP (ref 3.8–10.5)

## 2024-12-10 PROCEDURE — 99232 SBSQ HOSP IP/OBS MODERATE 35: CPT

## 2024-12-10 PROCEDURE — 93010 ELECTROCARDIOGRAM REPORT: CPT

## 2024-12-10 PROCEDURE — 99233 SBSQ HOSP IP/OBS HIGH 50: CPT

## 2024-12-10 RX ORDER — APIXABAN 2.5 MG/1
5 TABLET, FILM COATED ORAL EVERY 12 HOURS
Refills: 0 | Status: DISCONTINUED | OUTPATIENT
Start: 2024-12-10 | End: 2024-12-12

## 2024-12-10 RX ORDER — FENTANYL 12 UG/H
25 PATCH, EXTENDED RELEASE TRANSDERMAL
Refills: 0 | Status: DISCONTINUED | OUTPATIENT
Start: 2024-12-10 | End: 2024-12-12

## 2024-12-10 RX ORDER — ONDANSETRON HYDROCHLORIDE 4 MG/1
4 TABLET, FILM COATED ORAL ONCE
Refills: 0 | Status: DISCONTINUED | OUTPATIENT
Start: 2024-12-10 | End: 2024-12-12

## 2024-12-10 RX ADMIN — APIXABAN 5 MILLIGRAM(S): 2.5 TABLET, FILM COATED ORAL at 05:12

## 2024-12-10 RX ADMIN — ACETAMINOPHEN, DIPHENHYDRAMINE HCL, PHENYLEPHRINE HCL 3 MILLIGRAM(S): 325; 25; 5 TABLET ORAL at 21:35

## 2024-12-10 RX ADMIN — APIXABAN 5 MILLIGRAM(S): 2.5 TABLET, FILM COATED ORAL at 22:37

## 2024-12-10 RX ADMIN — METOPROLOL TARTRATE 12.5 MILLIGRAM(S): 100 TABLET, FILM COATED ORAL at 05:12

## 2024-12-10 NOTE — PROGRESS NOTE ADULT - ASSESSMENT
70y/o M w PMH of HFrEF w EF 26% per echo 11/24, AFlutter on Eliquis s/p DCCV 11/2024, vertigo, presenting to ED due to dizziness and hypotension at home for which cardiology was consulted.     Pt says he had dizziness w lightheadedness earlier today at home and felt the symptoms progressively got worse and not relieved w rest. He felt symptoms were not similar to his usual vertigo. He checked BP at home and noticed SBP was in 60s and thus presented to ED. In ED, pts's BP has been in sin SBP 80-100s. Orthostatic vitals x 1 neg. Trop, pBNP x 1 neg. CXR unremarkable. Pt has been compliant w his GDMT at home and no recent changes to his meds. Pt says he has been feeling lacking energy at home recently and occasionally chest tightness that relives in few mins. Pt has not noticed any weight gain or decreased urine output.  He has been maintaining adequate fluid intake at home.     Assessment and Plan:  # Dizziness/hypotension- resolved  Pt feels dizziness w lightheadedness different from his usual vertigo.   SBP in 60s at home. Orthostatic vitals x 1 neg. EKG w no acute ischemic changes, Trop, pBNP x 1 neg. CXR unremarkable.   Pt does not appear volume overloaded. Low suspicion for cardiogenic shock or ACS.    - continue to monitor on tele  - can restart home Toprol 12.5mg BID  - EP to see for possible inpatient CTI ablation- please hold farxiga in anticipation  - will hold home entresto, spironolactone for now in anticipation of possible ablation  - continue home eliquis  - hold home amio per EP  - repeat orthostatics

## 2024-12-10 NOTE — PROGRESS NOTE ADULT - ASSESSMENT
70 y/o M with pmh of chronic CHFrEF (EF 26%), paroxysmal atrial flutter on Eliquis s/p DCCV 11/2024, vertigo p/w dizziness and hypotension when getting up from seated position. C/W orthostatic hypotension although initial orthostatics negative. EP consulted as pt scheduled for CTI ablation and implantable loop recorder implantation with Dr. Quintanilla this week.  Patient took Farxiga on 12/9.  beta-hydraxy butyrate <0.0 mmol/L noted.  SBP improved (~100) after hold Entresto/Furosemide since admission.  AF ablation risks discussed included, but  were not limited to bleeding, infection, perforation, tamponade, need for cardiac surgery, stroke Informed written consent was obtained.     Recommendations:  -Continue telemetry monitoring    -Continue to hold off entresto, lasix  per general cardiology given pt symptoms and borderline BPs  -Hold off farxiga in anticipation of AF/AFL ablation repeat  Beta hydroxybutyrate this am,  type and screen x2 done  -hold off amiodarone per EP attending  -C/W Eliquis BID and OK to continue low dose beta blocker Metoprolol 12.5 mg bid (hold for SBP <95 or HR <50)  -NPO excepts meds for AFL/ possible AF ablation and ILR   70 y/o M with pmh of recently diagnosed biv HFrEF (EF 26%) in Nov-on GDMT, paroxysmal atrial flutter on Eliquis s/p DCCV 11/2024, vertigo p/w dizziness and hypotension when getting up from seated position. C/W orthostatic hypotension although initial orthostatics negative. EP consulted as pt scheduled for CTI ablation and implantable loop recorder implantation with Dr. Quintanilla this week.  Patient took Farxiga on 12/9.  beta-hydraxy butyrate <0.0 mmol/L noted.  SBP improved (~100) after hold Entresto/Furosemide since admission.  AF ablation risks discussed included, but were not limited to bleeding, infection, perforation, tamponade, need for cardiac surgery, stroke Informed written consent was obtained.     Recommendations:  -Continue telemetry monitoring    -Continue to hold off entresto, lasix  per general cardiology given pt symptoms and borderline BPs  -Hold off farxiga in anticipation of AF/AFL ablation repeat  Beta hydroxybutyrate this am,  type and screen x2 done  -hold off amiodarone per EP attending  -C/W Eliquis BID and OK to continue low dose beta blocker Metoprolol 12.5 mg bid (hold for SBP <95 or HR <50)  -NPO excepts meds for AFL/ possible AF ablation and ILR

## 2024-12-10 NOTE — PROGRESS NOTE ADULT - SUBJECTIVE AND OBJECTIVE BOX
Patient is a 69y old  Male who presents with a chief complaint of dizziness, low BP (10 Dec 2024 11:56)    SUBJECTIVE / OVERNIGHT EVENTS: No acute events. Reports dizziness feels better this morning. About to go down for ablation. Daughter at bedside.     MEDICATIONS  (STANDING):  apixaban 5 milliGRAM(s) Oral every 12 hours  famotidine    Tablet 20 milliGRAM(s) Oral daily  influenza  Vaccine (HIGH DOSE) 0.5 milliLiter(s) IntraMuscular once  melatonin 3 milliGRAM(s) Oral at bedtime  metoprolol tartrate 12.5 milliGRAM(s) Oral every 12 hours    MEDICATIONS  (PRN):      CAPILLARY BLOOD GLUCOSE        I&O's Summary    09 Dec 2024 07:01  -  10 Dec 2024 07:00  --------------------------------------------------------  IN: 640 mL / OUT: 0 mL / NET: 640 mL        PHYSICAL EXAM:  Vital Signs Last 24 Hrs  T(C): 36.6 (10 Dec 2024 10:44), Max: 36.6 (10 Dec 2024 01:00)  T(F): 97.8 (10 Dec 2024 10:44), Max: 97.8 (10 Dec 2024 01:00)  HR: 49 (10 Dec 2024 10:44) (48 - 61)  BP: 102/84 (10 Dec 2024 10:44) (100/57 - 102/84)  BP(mean): --  RR: 16 (10 Dec 2024 10:44) (16 - 18)  SpO2: 96% (10 Dec 2024 10:44) (96% - 99%)    Parameters below as of 10 Dec 2024 10:38  Patient On (Oxygen Delivery Method): room air    CONSTITUTIONAL: NAD, standing   EYES: conjunctiva and sclera clear  ENMT: Moist oral mucosa  RESPIRATORY: Normal respiratory effort; lungs are clear to auscultation bilaterally  CARDIOVASCULAR: Regular rate and rhythm, normal S1 and S2, No lower extremity edema  ABDOMEN: Nontender to palpation, normoactive bowel sounds, no rebound/guarding  PSYCH: calm    LABS:                        14.5   6.03  )-----------( 231      ( 10 Dec 2024 04:20 )             45.5     12-10    138  |  105  |  27[H]  ----------------------------<  99  4.2   |  23  |  1.26    Ca    9.0      10 Dec 2024 04:20  Phos  4.0     12-10  Mg     2.00     12-10    TPro  6.6  /  Alb  3.5  /  TBili  0.2  /  DBili  x   /  AST  15  /  ALT  17  /  AlkPhos  94  12-10    Urinalysis Basic - ( 10 Dec 2024 04:20 )    Color: x / Appearance: x / SG: x / pH: x  Gluc: 99 mg/dL / Ketone: x  / Bili: x / Urobili: x   Blood: x / Protein: x / Nitrite: x   Leuk Esterase: x / RBC: x / WBC x   Sq Epi: x / Non Sq Epi: x / Bacteria: x    RADIOLOGY & ADDITIONAL TESTS: Reviewed    COORDINATION OF CARE:  Care Discussed with Consultants/Other Providers [Y- Medicine ACP, CM, SW]

## 2024-12-10 NOTE — PROGRESS NOTE ADULT - SUBJECTIVE AND OBJECTIVE BOX
Patient is a 69y old  Male who presents with a chief complaint of dizziness, low BP (09 Dec 2024 14:32)  Patient denies CP, SOB or palpitations or lightheadedness or dizziness.  /60.      PAST MEDICAL & SURGICAL HISTORY:  No pertinent past medical history    No significant past surgical history        MEDICATIONS  (STANDING):  apixaban 5 milliGRAM(s) Oral every 12 hours  famotidine    Tablet 20 milliGRAM(s) Oral daily  influenza  Vaccine (HIGH DOSE) 0.5 milliLiter(s) IntraMuscular once  melatonin 3 milliGRAM(s) Oral at bedtime  metoprolol tartrate 12.5 milliGRAM(s) Oral every 12 hours    MEDICATIONS  (PRN):            Vital Signs Last 24 Hrs  T(C): 36.6 (10 Dec 2024 05:00), Max: 36.6 (10 Dec 2024 01:00)  T(F): 97.8 (10 Dec 2024 05:00), Max: 97.8 (10 Dec 2024 01:00)  HR: 55 (10 Dec 2024 05:00) (55 - 63)  BP: 101/60 (10 Dec 2024 05:00) (100/57 - 104/57)  BP(mean): --  RR: 17 (10 Dec 2024 05:00) (16 - 18)  SpO2: 97% (10 Dec 2024 05:00) (95% - 99%)    Parameters below as of 10 Dec 2024 05:00  Patient On (Oxygen Delivery Method): room air                INTERPRETATION OF TELEMETRY:  SR with SB 50's -60's and one 4 beats NSVT recorded on 12/9.  No significant joon <40 bpm seen on tele    ECG:        LABS:                        14.5   6.03  )-----------( 231      ( 10 Dec 2024 04:20 )             45.5     12-10    138  |  105  |  27[H]  ----------------------------<  99  4.2   |  23  |  1.26    Ca    9.0      10 Dec 2024 04:20  Phos  4.0     12-10  Mg     2.00     12-10    TPro  6.6  /  Alb  3.5  /  TBili  0.2  /  DBili  x   /  AST  15  /  ALT  17  /  AlkPhos  94  12-10  Beta Hydroxy-Butyrate: <0.0 mmol/L (12.10.24 @ 04:20)              Urinalysis Basic - ( 10 Dec 2024 04:20 )    Color: x / Appearance: x / SG: x / pH: x  Gluc: 99 mg/dL / Ketone: x  / Bili: x / Urobili: x   Blood: x / Protein: x / Nitrite: x   Leuk Esterase: x / RBC: x / WBC x   Sq Epi: x / Non Sq Epi: x / Bacteria: x        BNP  RADIOLOGY & ADDITIONAL STUDIES:    CONCLUSIONS:      1. Left ventricular systolic function is severely decreased. Global left ventricular hypokinesis.   2. Enlarged right ventricular cavity size and reduced right ventricular systolic function.   3. There is mitral valve thickening of the anterior and posterior leaflets. There is moderate mitral regurgitation. Vena contracta width ~ 0.4-0.5 cm. Estimated effective regurgitant orifice area (EROA) ~ 0.34 cm2 (by the PISA method).   4. The aortic valve appears trileaflet with normal systolic excursion. There is calcification of the aortic valve leaflets. There is no evidence of aortic regurgitation.   5. No atheroma in the visualized portions of the proximal ascending aorta. No atheroma in the visualized portions of the transverse aortic arch. No atheroma in the visualized portions of the descending aorta.   6. The left atrium is dilated. There is no evidence of left atrial or left atrial appendage thrombus.   7. Agitated saline injection was negative for intracardiac shunt.    PHYSICAL EXAM:    GENERAL: In no apparent distress, well nourished, and hydrated.  NECK: Supple and normal thyroid.  No JVD or carotid bruit.  Carotid pulse is 2+ bilaterally.  HEART: Regular rate and rhythm; No murmurs, rubs, or gallops.  PULMONARY: Clear to auscultation and perfusion.  No rales, wheezing, or rhonchi bilaterally.  ABDOMEN: Soft, Nontender, Nondistended; Bowel sounds present  EXTREMITIES:  2+ Peripheral Pulses, No clubbing, cyanosis, or edema  NEUROLOGICAL: alert oriented x4 speech clear no focal deficit noted

## 2024-12-10 NOTE — PROGRESS NOTE ADULT - SUBJECTIVE AND OBJECTIVE BOX
Cardiology Progress Note  ------------------------------------------------------------------------------------------  SUBJECTIVE:   - Tele: NSR  - No events overnight. states vertigo symptoms have resolved  -------------------------------------------------------------------------------------------    VS:  T(F): 98.4 (12-09), Max: 98.4 (12-09)  HR: 57 (12-09) (53 - 67)  BP: 95/59 (12-09) (93/57 - 103/63)  RR: 16 (12-09)  SpO2: 96% (12-09)  I&O's Summary    08 Dec 2024 07:01  -  09 Dec 2024 07:00  --------------------------------------------------------  IN: 1030 mL / OUT: 0 mL / NET: 1030 mL    09 Dec 2024 07:01  -  09 Dec 2024 12:00  --------------------------------------------------------  IN: 200 mL / OUT: 0 mL / NET: 200 mL      PHYSICAL EXAM:  GENERAL: NAD  HEAD:  Atraumatic, Normocephalic.  EYES: EOMI, PERRLA, conjunctiva and sclera clear.  ENT: Moist mucous membranes.  NECK: Supple, No JVD.  CHEST/LUNG: Clear to auscultation bilaterally; No rales, rhonchi, wheezing, or rubs. Unlabored respirations.  HEART: Regular rate and rhythm; No murmurs, rubs, or gallops.  ABDOMEN: Bowel sounds present; Soft, Nontender, Nondistended.   EXTREMITIES: No edema. 2+ Peripheral Pulses, brisk capillary refill. No clubbing or cyanosis.   PSYCH: Normal affect.  SKIN: No rashes or lesions.  -------------------------------------------------------------------------------------------  LABS:                          14.9   6.55  )-----------( 258      ( 09 Dec 2024 06:00 )             45.1     12-09    135  |  102  |  29[H]  ----------------------------<  80  4.0   |  22  |  1.28    Ca    9.1      09 Dec 2024 06:00  Phos  3.9     12-09  Mg     2.00     12-09    TPro  6.5  /  Alb  3.5  /  TBili  0.6  /  DBili  x   /  AST  15  /  ALT  14  /  AlkPhos  89  12-09    PT/INR - ( 07 Dec 2024 18:08 )   PT: 12.4 sec;   INR: 1.07 ratio         PTT - ( 07 Dec 2024 18:08 )  PTT:34.8 sec  CARDIAC MARKERS ( 07 Dec 2024 21:06 )  8 ng/L / x     / x     / x     / x     / x      CARDIAC MARKERS ( 07 Dec 2024 18:08 )  10 ng/L / x     / x     / x     / x     / x                -------------------------------------------------------------------------------------------  Meds:  aMIOdarone    Tablet 200 milliGRAM(s) Oral daily  apixaban 5 milliGRAM(s) Oral every 12 hours  dapagliflozin 10 milliGRAM(s) Oral daily  famotidine    Tablet 20 milliGRAM(s) Oral daily  influenza  Vaccine (HIGH DOSE) 0.5 milliLiter(s) IntraMuscular once  melatonin 3 milliGRAM(s) Oral at bedtime    -------------------------------------------------------------------------------------------

## 2024-12-10 NOTE — SWALLOW BEDSIDE ASSESSMENT ADULT - COMMENTS
Progress Note-Hospitalist 12/9: "68 y/o M with pmh of CHF (EF 26%), atrial flutter on Eliquis s/p DCCV 11/2024, vertigo p/w dizziness and hypotension likely 2/2 medications. Now admitted to medicine for further management; SUBJECTIVE / OVERNIGHT EVENTS: No acute events. Still with some dizziness. No chest pain. Reports ongoing sensation of difficulty swallowing- especially with liquids when drinking quickly, denies prior evaluation- pending swallow evaluation here."     CXR 12/7: "IMPRESSION: Clear lungs."     Order for Clinical Swallow Evaluation received and chart review completed. Upon discussion with ACP Estela via Teams, patient NPO for procedure. Requested Clinical Swallow Evaluation be deferred until ablation procedure is completed. This service to follow as schedule permits to clinically assess oropharyngeal swallow function.

## 2024-12-11 LAB
ANION GAP SERPL CALC-SCNC: 13 MMOL/L — SIGNIFICANT CHANGE UP (ref 7–14)
BUN SERPL-MCNC: 25 MG/DL — HIGH (ref 7–23)
CALCIUM SERPL-MCNC: 8.6 MG/DL — SIGNIFICANT CHANGE UP (ref 8.4–10.5)
CHLORIDE SERPL-SCNC: 106 MMOL/L — SIGNIFICANT CHANGE UP (ref 98–107)
CO2 SERPL-SCNC: 19 MMOL/L — LOW (ref 22–31)
CREAT SERPL-MCNC: 1.22 MG/DL — SIGNIFICANT CHANGE UP (ref 0.5–1.3)
EGFR: 64 ML/MIN/1.73M2 — SIGNIFICANT CHANGE UP
GLUCOSE SERPL-MCNC: 119 MG/DL — HIGH (ref 70–99)
HCT VFR BLD CALC: 42.5 % — SIGNIFICANT CHANGE UP (ref 39–50)
HGB BLD-MCNC: 14 G/DL — SIGNIFICANT CHANGE UP (ref 13–17)
MAGNESIUM SERPL-MCNC: 2 MG/DL — SIGNIFICANT CHANGE UP (ref 1.6–2.6)
MCHC RBC-ENTMCNC: 26.9 PG — LOW (ref 27–34)
MCHC RBC-ENTMCNC: 32.9 G/DL — SIGNIFICANT CHANGE UP (ref 32–36)
MCV RBC AUTO: 81.7 FL — SIGNIFICANT CHANGE UP (ref 80–100)
NRBC # BLD: 0 /100 WBCS — SIGNIFICANT CHANGE UP (ref 0–0)
NRBC # FLD: 0 K/UL — SIGNIFICANT CHANGE UP (ref 0–0)
PHOSPHATE SERPL-MCNC: 3.8 MG/DL — SIGNIFICANT CHANGE UP (ref 2.5–4.5)
PLATELET # BLD AUTO: 226 K/UL — SIGNIFICANT CHANGE UP (ref 150–400)
POTASSIUM SERPL-MCNC: 4 MMOL/L — SIGNIFICANT CHANGE UP (ref 3.5–5.3)
POTASSIUM SERPL-SCNC: 4 MMOL/L — SIGNIFICANT CHANGE UP (ref 3.5–5.3)
RBC # BLD: 5.2 M/UL — SIGNIFICANT CHANGE UP (ref 4.2–5.8)
RBC # FLD: 13.3 % — SIGNIFICANT CHANGE UP (ref 10.3–14.5)
SODIUM SERPL-SCNC: 138 MMOL/L — SIGNIFICANT CHANGE UP (ref 135–145)
WBC # BLD: 6.45 K/UL — SIGNIFICANT CHANGE UP (ref 3.8–10.5)
WBC # FLD AUTO: 6.45 K/UL — SIGNIFICANT CHANGE UP (ref 3.8–10.5)

## 2024-12-11 PROCEDURE — 99223 1ST HOSP IP/OBS HIGH 75: CPT

## 2024-12-11 PROCEDURE — 99233 SBSQ HOSP IP/OBS HIGH 50: CPT

## 2024-12-11 RX ORDER — METOPROLOL TARTRATE 100 MG/1
12.5 TABLET, FILM COATED ORAL
Refills: 0 | Status: DISCONTINUED | OUTPATIENT
Start: 2024-12-11 | End: 2024-12-12

## 2024-12-11 RX ORDER — SACUBITRIL AND VALSARTAN 24; 26 MG/1; MG/1
1 TABLET, FILM COATED ORAL
Refills: 0 | Status: DISCONTINUED | OUTPATIENT
Start: 2024-12-11 | End: 2024-12-12

## 2024-12-11 RX ADMIN — METOPROLOL TARTRATE 12.5 MILLIGRAM(S): 100 TABLET, FILM COATED ORAL at 05:50

## 2024-12-11 RX ADMIN — SACUBITRIL AND VALSARTAN 1 TABLET(S): 24; 26 TABLET, FILM COATED ORAL at 18:38

## 2024-12-11 RX ADMIN — APIXABAN 5 MILLIGRAM(S): 2.5 TABLET, FILM COATED ORAL at 21:39

## 2024-12-11 RX ADMIN — ACETAMINOPHEN, DIPHENHYDRAMINE HCL, PHENYLEPHRINE HCL 3 MILLIGRAM(S): 325; 25; 5 TABLET ORAL at 23:57

## 2024-12-11 RX ADMIN — APIXABAN 5 MILLIGRAM(S): 2.5 TABLET, FILM COATED ORAL at 10:39

## 2024-12-11 RX ADMIN — FAMOTIDINE 20 MILLIGRAM(S): 20 TABLET, FILM COATED ORAL at 12:23

## 2024-12-11 NOTE — PROGRESS NOTE ADULT - TIME BILLING
Review of laboratory data, radiology results, consultant recommendations, EMR documentation, discussion with patient/advanced care providers and interdisciplinary staff.
Review of laboratory data, radiology results, consultant recommendations, EMR documentation, discussion with patient/advanced care providers and interdisciplinary staff.

## 2024-12-11 NOTE — PROGRESS NOTE ADULT - PROBLEM SELECTOR PLAN 2
- TFTs obtained, WNL  - S/p ablation and loop implantation by EP 12/10/24  - EP recommending DC amiodarone  - C/w tele
- TFTs obtained, WNL  - EP planning CTI ablation today. Amiodarone on hold per Cardiology, c/w toprol 12.5 BID, F/u additional cardiology recs
- TFTs obtained, WNL  - EP planning CTI ablation tomorrow. Amiodarone on hold per Cardiology, c/w toprol 12.5 BID
- monitor on telemetry  - plan to restart amiodarone 12/8  - will resume Toprol 12/9 if BPs stable  - TFTs obtained, WNL

## 2024-12-11 NOTE — PROVIDER CONTACT NOTE (OTHER) - BACKGROUND
Patient presented with dizziness and hypotension
Patient presented with dizziness and hypotension
69M, recently diagnosed for A flutter.
Pt admitted for dizziness.
Pt admitted for chest pain
pt was admitted for dizziness and giddiness

## 2024-12-11 NOTE — SWALLOW BEDSIDE ASSESSMENT ADULT - SWALLOW EVAL: DIAGNOSIS
1. Functional oral stage for puree, regular solids, and thin liquids as characterized by adequate oral acceptance/containment, adequate mastication for regular solids, adequate anterior-posterior transport, and adequate oral clearance. 2. Functional pharyngeal stage for aforementioned consistencies as characterized by suspected timely initiation of the pharyngeal swallow trigger and +hyolaryngeal excursion upon digital palpation. No overt s/sx aspiration/penetration evidenced post oral-intake PO trials. Patient denied globus/stuck sensation in throat; Reiterated that sensation which occasionally occurs is in the mid chest; Consider GI consult to evaluate.

## 2024-12-11 NOTE — PROVIDER CONTACT NOTE (OTHER) - REASON
Bp 98/75
Elevated HR
Patient's blood pressure
Patient's blood pressure
Pt had 4 beats vtach
Metoprolol held

## 2024-12-11 NOTE — SWALLOW BEDSIDE ASSESSMENT ADULT - COMMENTS
Chart Note-Event Note PA 12/11: "Patient is s/p ablation via right femoral access. Patient without any complaints. Site is stable with no hematoma or active bleed noted. No swelling, dressing is clean/intact/dry. Right femoral pulse palpable. Strength is equal bilaterally. Patient has full ROM in LE. Capillary refill < 2 seconds. Will continue to monitor closely. Eliquis restarted per EP recommendations."    Progress Note-Hospitalist 12/10: "70 y/o M with pmh of CHF (EF 26%), atrial flutter on Eliquis s/p DCCV 11/2024, vertigo p/w dizziness and hypotension likely 2/2 medications. Now admitted to medicine for further management."    CXR 12/7: "IMPRESSION: Clear lungs." Chart Note-Event Note PA 12/11: "Patient is s/p ablation via right femoral access. Patient without any complaints. Site is stable with no hematoma or active bleed noted. No swelling, dressing is clean/intact/dry. Right femoral pulse palpable. Strength is equal bilaterally. Patient has full ROM in LE. Capillary refill < 2 seconds. Will continue to monitor closely. Eliquis restarted per EP recommendations."    Progress Note-Hospitalist 12/10: "70 y/o M with pmh of CHF (EF 26%), atrial flutter on Eliquis s/p DCCV 11/2024, vertigo p/w dizziness and hypotension likely 2/2 medications. Now admitted to medicine for further management."    CXR 12/7: "IMPRESSION: Clear lungs."    Patient received awake/alert and upright in bed, on room air, and in no acute distress. Able to make wants and needs known and follow verbal directives. Endorsed recent onset of occasional "stuck sensation" in mid chest following PO intake of pills with water. Stated, "I try to drink more water, but that doesn't seem to help." Additionally reported concern re: swollen/enlarged glands (ACP notified; See US). Patient further stated, "Since I got COVID in 2020, I cough when I drink too quickly." Upon questioning, stated this occurs less than once a week. Agreeable to PO trials with SLP.

## 2024-12-11 NOTE — CONSULT NOTE ADULT - PROBLEM SELECTOR RECOMMENDATION 2
Admitted with hypotension secondary to hypovolemia.   Has been off Entesto, Lasix, Farxiga and rajendra.   Currently he is euvolemic.  Change Lopressor to Toprol 12.5 mg po BID. Hold for SBP <90.  Add Entresto 24/26 mg po BID. Hold for SBP <90, HR <60.   Add spironolactone 12.5 mg po daily. Hold for SBP <90.   Consider adding Farxiaga 10 mg on d/c.   Lasix to be 20 mg every other day on d/c.   Keep K 4.0-5.0 and mag >2.0.   Daily standing weights and strict I/O.   D/c planning for tomorrow. Admitted with hypotension secondary to hypovolemia.   Has been off Entesto, Lasix, Farxiga and rajendra.   Currently he is euvolemic.  Change Lopressor to Toprol 12.5 mg po BID. Hold for SBP <90.  Add Entresto 24/26 mg po BID. Hold for SBP <90, HR <60.   Add spironolactone 12.5 mg po daily. Hold for SBP <90.   Add Farxiga 10 mg daily.  As d/w Dr. Jordan- Kate to be PRN 20 mg for weight gain more than 3 lbs, SOB, LE edema.   Keep K 4.0-5.0 and mag >2.0.   Daily standing weights and strict I/O.   D/c planning for tomorrow. We will give him an appointment to follow up.

## 2024-12-11 NOTE — PROGRESS NOTE ADULT - PROBLEM SELECTOR PROBLEM 1
Orthostatic lightheadedness

## 2024-12-11 NOTE — PROVIDER CONTACT NOTE (OTHER) - SITUATION
pt had an elevated HR of 110 upon admission to . he is asymptomatic.
Pt had 4 beats vtach
Metoprolol 12.5mg held dose for parameter (HR less than 60). /57. HR 54.
Patient's blood pressure was 101/50
Patient's blood pressure checked during q4 vital check     Blood pressure 99/58
Bp 98/75

## 2024-12-11 NOTE — PROGRESS NOTE ADULT - ASSESSMENT
Pt states she hasn't been contacted with lab results of last month. She states she usually get a letter in didn't receive.     Asking to be contacted    Call/   Gina Tapia (Self) [phone] 70y/o M w PMH of HFrEF w EF 26% per echo 11/24, AFlutter on Eliquis s/p DCCV 11/2024, vertigo, presenting to ED due to dizziness and hypotension at home for which cardiology was consulted.     Pt says he had dizziness w lightheadedness earlier today at home and felt the symptoms progressively got worse and not relieved w rest. He felt symptoms were not similar to his usual vertigo. He checked BP at home and noticed SBP was in 60s and thus presented to ED. In ED, pts's BP has been in sin SBP 80-100s. Orthostatic vitals x 1 neg. Trop, pBNP x 1 neg. CXR unremarkable. Pt has been compliant w his GDMT at home and no recent changes to his meds. Pt says he has been feeling lacking energy at home recently and occasionally chest tightness that relives in few mins. Pt has not noticed any weight gain or decreased urine output.  He has been maintaining adequate fluid intake at home.     Assessment and Plan:  # Dizziness/hypotension- resolved  Pt feels dizziness w lightheadedness different from his usual vertigo.   SBP in 60s at home. Orthostatic vitals x 1 neg. EKG w no acute ischemic changes, Trop, pBNP x 1 neg. CXR unremarkable.   Pt does not appear volume overloaded. Low suspicion for cardiogenic shock or ACS.  S/p CTI ablation w/ EP on 12/10    - continue to monitor on tele  - restart home metoprolol succinate 12.5mg BID (is getting metoprolol tartrate here)  - on discharge, can restart home farxiga and entresto 24-26  - hold home spironolactone on discharge  - continue home eliquis    No further recommendations from cardiology perspective at this time  We will sign off, reach out as needed  On discharge, patient should follow up with:  - Dr Silverio of General Cardiology  - Dr Quintanilla of EP  - Dr Angel of Structural Cardiology- his office will call the patient to establish a new patient appt in 6-8 weeks for evaluation of mod-severe MR

## 2024-12-11 NOTE — CONSULT NOTE ADULT - PROBLEM SELECTOR RECOMMENDATION 9
CTI ablation and implantable loop recorder implantation on 12/10.   Continue on Eliquis.   Change Lopressor to Toprol 12.5 mg po BID. Hold for SBP <90.

## 2024-12-11 NOTE — CONSULT NOTE ADULT - NS ATTEND AMEND GEN_ALL_CORE FT
69 year old man with known HFrEF who presented with dizziness  Compliant with his GDMT  Patient hypotension likely secondary to being dry  No evidence of shock- patient warm with no lactate and downtrending Cr  COntinue to hold GDMT and reinstate slowly
69yrs,   Recent diagnosis of HFrEF Nov 2024. LVEF 15-20. LVEDD 4.8.   Admission: Nov 10-14- Rapid aflutter, DCCV. new finding of LVEF.   d/c on GDMT. follow one time in the clinic.   outpatient meds: amnio 200, eloquis 5 bid, farxega 10, lasix 40, toprol 12.5 bid, entresto low dose , ald 25.   no other medical issues.   Now admitted 12.7 hypotensive and dizzy with KOLBY Cr 1.7 (baseline 1.1)  HAd a scheduled ablation which he underwent 12.9.   current meds: eloquis, toprol 12.5 bid off all other meds.   HR SR 50-61, 99/-102/ 179  12-11    138  |  106  |  25[H]  ----------------------------<  119[H]  4.0   |  19[L]  |  1.22    Ca    8.6      11 Dec 2024 03:45  Phos  3.8     12-11  Mg     2.00     12-11    TPro  6.6  /  Alb  3.5  /  TBili  0.2  /  DBili  x   /  AST  15  /  ALT  17  /  AlkPhos  94  12-10                        14.0   6.45  )-----------( 226      ( 11 Dec 2024 03:45 )             42.5   Patient is stable post ablation.   Start low dose entresto from tonight.   hold diuretics and farxega.   patient may be d/c tomorrow on:  entresto low dose, ald 12.5, off lasix, farxega 10, toprol 12.5 bid.   he will follow daily standing weights and take lasix 20 QD if his weight increased more than 3 lbs.   Follow up HF clinic within 2 weeks  Chirag Jordan

## 2024-12-11 NOTE — PROGRESS NOTE ADULT - SUBJECTIVE AND OBJECTIVE BOX
Patient is a 69y old  Male who presents with a chief complaint of dizziness, low BP (11 Dec 2024 11:12)    Patient denies CP, SOB or palpitations or dizziness.     PAST MEDICAL & SURGICAL HISTORY:  No pertinent past medical history    No significant past surgical history      MEDICATIONS  (STANDING):  apixaban 5 milliGRAM(s) Oral every 12 hours  famotidine    Tablet 20 milliGRAM(s) Oral daily  influenza  Vaccine (HIGH DOSE) 0.5 milliLiter(s) IntraMuscular once  melatonin 3 milliGRAM(s) Oral at bedtime  metoprolol tartrate 12.5 milliGRAM(s) Oral every 12 hours    MEDICATIONS  (PRN):  fentaNYL    Injectable 25 MICROGram(s) IV Push every 5 minutes PRN Moderate Pain (4 - 6)  ondansetron Injectable 4 milliGRAM(s) IV Push once PRN Nausea and/or Vomiting            Vital Signs Last 24 Hrs  T(C): 36.8 (11 Dec 2024 05:50), Max: 36.8 (10 Dec 2024 16:15)  T(F): 98.3 (11 Dec 2024 05:50), Max: 98.3 (10 Dec 2024 16:15)  HR: 57 (11 Dec 2024 05:50) (50 - 66)  BP: 107/61 (11 Dec 2024 05:50) (93/61 - 107/70)  BP(mean): --  RR: 17 (11 Dec 2024 05:50) (14 - 18)  SpO2: 98% (11 Dec 2024 05:50) (96% - 98%)    Parameters below as of 11 Dec 2024 05:50  Patient On (Oxygen Delivery Method): room air                INTERPRETATION OF TELEMETRY:  SR with SB 50-60 bpm    ECG: SR        LABS:                        14.0   6.45  )-----------( 226      ( 11 Dec 2024 03:45 )             42.5     12-11    138  |  106  |  25[H]  ----------------------------<  119[H]  4.0   |  19[L]  |  1.22    Ca    8.6      11 Dec 2024 03:45  Phos  3.8     12-11  Mg     2.00     12-11    TPro  6.6  /  Alb  3.5  /  TBili  0.2  /  DBili  x   /  AST  15  /  ALT  17  /  AlkPhos  94  12-10          Urinalysis Basic - ( 11 Dec 2024 03:45 )    Color: x / Appearance: x / SG: x / pH: x  Gluc: 119 mg/dL / Ketone: x  / Bili: x / Urobili: x   Blood: x / Protein: x / Nitrite: x   Leuk Esterase: x / RBC: x / WBC x   Sq Epi: x / Non Sq Epi: x / Bacteria: x        BNP  RADIOLOGY & ADDITIONAL STUDIES:        PHYSICAL EXAM:    GENERAL: In no apparent distress, well nourished, and hydrated.  NECK: Supple and normal thyroid.  No JVD or carotid bruit.  Carotid pulse is 2+ bilaterally.  HEART: Regular rate and rhythm; No murmurs, rubs, or gallops.  PULMONARY: Clear to auscultation and perfusion.  No rales, wheezing, or rhonchi bilaterally.  ABDOMEN: Soft, Nontender, Nondistended; Bowel sounds present  EXTREMITIES:  2+ Peripheral Pulses, No clubbing, cyanosis, or edema; R groin incision site no bleeding or hematoma noted  NEUROLOGICAL: alert oriented x4 speech clear no focal deficit noted

## 2024-12-11 NOTE — PROGRESS NOTE ADULT - SUBJECTIVE AND OBJECTIVE BOX
Patient is a 69y old  Male who presents with a chief complaint of dizziness, low BP (11 Dec 2024 11:13)    SUBJECTIVE / OVERNIGHT EVENTS: No acute events. Reports dizziness has resolved. No chest pain, shortness of breath or other discomfort.     MEDICATIONS  (STANDING):  apixaban 5 milliGRAM(s) Oral every 12 hours  famotidine    Tablet 20 milliGRAM(s) Oral daily  influenza  Vaccine (HIGH DOSE) 0.5 milliLiter(s) IntraMuscular once  melatonin 3 milliGRAM(s) Oral at bedtime  metoprolol tartrate 12.5 milliGRAM(s) Oral every 12 hours    MEDICATIONS  (PRN):  fentaNYL    Injectable 25 MICROGram(s) IV Push every 5 minutes PRN Moderate Pain (4 - 6)  ondansetron Injectable 4 milliGRAM(s) IV Push once PRN Nausea and/or Vomiting      CAPILLARY BLOOD GLUCOSE    I&O's Summary    PHYSICAL EXAM:  Vital Signs Last 24 Hrs  T(C): 36.9 (11 Dec 2024 12:34), Max: 36.9 (11 Dec 2024 12:34)  T(F): 98.4 (11 Dec 2024 12:34), Max: 98.4 (11 Dec 2024 12:34)  HR: 58 (11 Dec 2024 12:34) (50 - 66)  BP: 106/60 (11 Dec 2024 12:34) (93/61 - 107/70)  BP(mean): --  RR: 17 (11 Dec 2024 12:34) (14 - 18)  SpO2: 98% (11 Dec 2024 12:34) (96% - 98%)    Parameters below as of 11 Dec 2024 12:34  Patient On (Oxygen Delivery Method): room air    CONSTITUTIONAL: NAD, laying in bed  EYES: conjunctiva and sclera clear  ENMT: Moist oral mucosa  RESPIRATORY: Normal respiratory effort; lungs are clear to auscultation bilaterally  CARDIOVASCULAR: Regular rate and rhythm, normal S1 and S2, No lower extremity edema  ABDOMEN: Nontender to palpation, normoactive bowel sounds, no rebound/guarding  PSYCH: calm    LABS:                        14.0   6.45  )-----------( 226      ( 11 Dec 2024 03:45 )             42.5     12-11    138  |  106  |  25[H]  ----------------------------<  119[H]  4.0   |  19[L]  |  1.22    Ca    8.6      11 Dec 2024 03:45  Phos  3.8     12-11  Mg     2.00     12-11    TPro  6.6  /  Alb  3.5  /  TBili  0.2  /  DBili  x   /  AST  15  /  ALT  17  /  AlkPhos  94  12-10    Urinalysis Basic - ( 11 Dec 2024 03:45 )    Color: x / Appearance: x / SG: x / pH: x  Gluc: 119 mg/dL / Ketone: x  / Bili: x / Urobili: x   Blood: x / Protein: x / Nitrite: x   Leuk Esterase: x / RBC: x / WBC x   Sq Epi: x / Non Sq Epi: x / Bacteria: x    RADIOLOGY & ADDITIONAL TESTS: Reviewed    COORDINATION OF CARE:  Care Discussed with Consultants/Other Providers [Y- Medicine ACP, Cardiology, RN, SW, CM]

## 2024-12-11 NOTE — PROGRESS NOTE ADULT - PROBLEM SELECTOR PLAN 1
intermittent dizziness, currently patient reports resolved  - S/p ablation and loop implantation by EP 12/10/24  - HF consult appreciated, follow up recs regarding resuming GDMT  - F/u additional cardiology recs  - Monitor closely for recurrence of dizziness
intermittent dizziness, currently patient reports improving from prior  - holding Entresto and Lasix per cardiology  - Plan for CTI ablation today with ILR placement  - cardiology recs appreciated
Continued intermittent dizziness    - holding Entresto and Lasix per cardiology  - Plan for CTI ablation tomorrow with ILR placement  - cardiology recs appreciated
Likely 2/2 medication effects, overdiuresis, and possible chronotropic incompetence.    - Orthostatic vitals negative  - hold Entresto and Lasix  - cardiology consulted, recs appreciated

## 2024-12-11 NOTE — SWALLOW BEDSIDE ASSESSMENT ADULT - ASR SWALLOW REFERRAL
GI consult to assess/rule-out esophageal dysphagia component (which can exacerbate the swallowing mechanism), given patient report of occasional "stuck sensation" in mid chest following PO intake of pills with water; Consider ENT consult at MD's discretion given patient concern re: enlarged/swollen glands in throat.

## 2024-12-11 NOTE — PROGRESS NOTE ADULT - PROBLEM SELECTOR PROBLEM 3
Paroxysmal atrial flutter

## 2024-12-11 NOTE — PROVIDER CONTACT NOTE (OTHER) - ASSESSMENT
/57. HR 54. Denies chest pain, SOB, headache, lightheadedness.
Patient's blood pressure normally runs on the soft side    Patient stable and asymptomatic
T97.7, BP 98/75 (85), HR 55, Spo2 98, RR 15. Pt is asymptomatic
Patient remains stable and asymptomatic
pt is A&O 4, no s/s of acute distress noted
Pt had 4 beats vtach. Denies chest pain, SOB, headache, lightheadedness.

## 2024-12-11 NOTE — CONSULT NOTE ADULT - ASSESSMENT
68 y/o male from [place], recent diagnosis of HFrEF (TTE on 11/10/24 shows biventricular dysfunction with LVEF of 15-20%, LVIDD 4.8cm, moderate to severe MR, VTI 7), paroxysmal atrial flutter on Eliquis s/p DCCV 11/2024, comes in with dizziness and hypotension with SBP 80s at home. Labs show proBNP 111, BUN28/1.67 (Nov was 25/1.18). CXR shows clear lungs.  He was seen by EP service and had CTI ablation and implantable loop recorder implantation on 12/10. He was found to be hypovolemic with an KOLBY and Entesto, spironolactone, Farxiga, Lasix held. Patient has been taking Lasix 40 mg daily. Currently KOLBY has resolved and he is feeling a lot better. No longer dizzy. Denies SOB at rest, PORRAS, CP, palpitations. Overall stage C HF, NYHA class II-admitted with hypovolemia/hypotension which has since resolved.

## 2024-12-11 NOTE — SWALLOW BEDSIDE ASSESSMENT ADULT - ASR SWALLOW RECOMMEND DIAG
2. Objective testing not indicated at this time, given absence of overt s/sx aspiration/penetration; Chest imaging unremarkable for infection.

## 2024-12-11 NOTE — CONSULT NOTE ADULT - SUBJECTIVE AND OBJECTIVE BOX
Date of Admission:    CHIEF COMPLAINT:    HISTORY OF PRESENT ILLNESS:      Allergies    No Known Allergies    Intolerances    	    MEDICATIONS:  apixaban 5 milliGRAM(s) Oral every 12 hours  metoprolol tartrate 12.5 milliGRAM(s) Oral every 12 hours        fentaNYL    Injectable 25 MICROGram(s) IV Push every 5 minutes PRN  melatonin 3 milliGRAM(s) Oral at bedtime  ondansetron Injectable 4 milliGRAM(s) IV Push once PRN    famotidine    Tablet 20 milliGRAM(s) Oral daily      influenza  Vaccine (HIGH DOSE) 0.5 milliLiter(s) IntraMuscular once      PAST MEDICAL & SURGICAL HISTORY:  No pertinent past medical history      No significant past surgical history          FAMILY HISTORY:  No pertinent family history in first degree relatives        SOCIAL HISTORY:    [ ] Non-smoker  [ ] Smoker  [ ] Alcohol      REVIEW OF SYSTEMS:  CONSTITUTIONAL: No fever, weight loss, or fatigue  EYES: No eye pain, visual disturbances, or discharge  ENMT:  No difficulty hearing, tinnitus, vertigo; No sinus or throat pain  NECK: No pain or stiffness  RESPIRATORY: No cough, wheezing, chills or hemoptysis; No Shortness of Breath  CARDIOVASCULAR: No chest pain, palpitations, passing out, dizziness, or leg swelling  GASTROINTESTINAL: No abdominal or epigastric pain. No nausea, vomiting, or hematemesis; No diarrhea or constipation. No melena or hematochezia.  GENITOURINARY: No dysuria, frequency, hematuria, or incontinence  NEUROLOGICAL: No headaches, memory loss, loss of strength, numbness, or tremors  SKIN: No itching, burning, rashes, or lesions   LYMPH Nodes: No enlarged glands  ENDOCRINE: No heat or cold intolerance; No hair loss  MUSCULOSKELETAL: No joint pain or swelling; No muscle, back, or extremity pain  PSYCHIATRIC: No depression, anxiety, mood swings, or difficulty sleeping  HEME/LYMPH: No easy bruising, or bleeding gums  ALLERY AND IMMUNOLOGIC: No hives or eczema	    [ ] All others negative	  [ ] Unable to obtain    PHYSICAL EXAM:  T(C): 36.8 (12-11-24 @ 05:50), Max: 36.8 (12-10-24 @ 16:15)  HR: 57 (12-11-24 @ 05:50) (50 - 66)  BP: 107/61 (12-11-24 @ 05:50) (93/61 - 107/70)  RR: 17 (12-11-24 @ 05:50) (14 - 18)  SpO2: 98% (12-11-24 @ 05:50) (96% - 98%)  Wt(kg): --  I&O's Summary      Appearance: Normal	  HEENT:   Normal oral mucosa, PERRL, EOMI	  Lymphatic: No lymphadenopathy  Cardiovascular: Normal S1 S2, No JVD, No murmurs, No edema  Respiratory: Lungs clear to auscultation	  Psychiatry: A & O x 3, Mood & affect appropriate  Gastrointestinal:  Soft, Non-tender, + BS	  Skin: No rashes, No ecchymoses, No cyanosis	  Neurologic: Non-focal  Extremities: Normal range of motion, No clubbing, cyanosis or edema  Vascular: Peripheral pulses palpable 2+ bilaterally        LABS:	 	    CBC Full  -  ( 11 Dec 2024 03:45 )  WBC Count : 6.45 K/uL  Hemoglobin : 14.0 g/dL  Hematocrit : 42.5 %  Platelet Count - Automated : 226 K/uL  Mean Cell Volume : 81.7 fL  Mean Cell Hemoglobin : 26.9 pg  Mean Cell Hemoglobin Concentration : 32.9 g/dL  Auto Neutrophil # : x  Auto Lymphocyte # : x  Auto Monocyte # : x  Auto Eosinophil # : x  Auto Basophil # : x  Auto Neutrophil % : x  Auto Lymphocyte % : x  Auto Monocyte % : x  Auto Eosinophil % : x  Auto Basophil % : x    12-11    138  |  106  |  25[H]  ----------------------------<  119[H]  4.0   |  19[L]  |  1.22  12-10    138  |  105  |  27[H]  ----------------------------<  99  4.2   |  23  |  1.26    Ca    8.6      11 Dec 2024 03:45  Ca    9.0      10 Dec 2024 04:20  Phos  3.8     12-11  Phos  4.0     12-10  Mg     2.00     12-11  Mg     2.00     12-10    TPro  6.6  /  Alb  3.5  /  TBili  0.2  /  DBili  x   /  AST  15  /  ALT  17  /  AlkPhos  94  12-10      < from: GERALDINE W or WO Ultrasound Enhancing Agent (11.12.24 @ 13:14) >    CONCLUSIONS:      1. Left ventricular systolic function is severely decreased. Global left ventricular hypokinesis.   2. Enlarged right ventricular cavity size and reduced right ventricular systolic function.   3. There is mitral valve thickening of the anterior and posterior leaflets. There is moderate mitral regurgitation. Vena contracta width ~ 0.4-0.5 cm. Estimated effective regurgitant orifice area (EROA) ~ 0.34 cm2 (by the PISA method).   4. The aortic valve appears trileaflet with normal systolic excursion. There is calcification of the aortic valve leaflets. There is no evidence of aortic regurgitation.   5. No atheroma in the visualized portions of the proximal ascending aorta. No atheroma in the visualized portions of the transverse aortic arch. No atheroma in the visualized portions of the descending aorta.   6. The left atrium is dilated. There is no evidence of left atrial or left atrial appendage thrombus.   7. Agitated saline injection was negative for intracardiac shunt.      < end of copied text >       Date of Admission:    CHIEF COMPLAINT: dizziness and hypotension     HISTORY OF PRESENT ILLNESS:    68 y/o male from [place], recent diagnosis of HFrEF (TTE on 11/10/24 shows biventricular dysfunction with LVEF of 15-20%, LVIDD 4.8cm, moderate to severe MR, VTI 7), paroxysmal atrial flutter on Eliquis s/p DCCV 11/2024, comes in with dizziness and hypotension with SBP 80s at home. Labs show proBNP 111, BUN28/1.67 (Nov was 25/1.18). CXR shows clear lungs.  He was seen by EP service and had CTI ablation and implantable loop recorder implantation on 12/10. He was found to be hypovolemic with an KOLBY and Entesto, spironolactone, Farxiga, Lasix held. Patient has been taking Lasix 40 mg daily. Currently KOLBY has resolved and he is feeling a lot better. No longer dizzy. Denies SOB at rest, PORRAS, CP, palpitations.         Allergies    No Known Allergies    Intolerances    	    MEDICATIONS:  apixaban 5 milliGRAM(s) Oral every 12 hours  metoprolol tartrate 12.5 milliGRAM(s) Oral every 12 hours        fentaNYL    Injectable 25 MICROGram(s) IV Push every 5 minutes PRN  melatonin 3 milliGRAM(s) Oral at bedtime  ondansetron Injectable 4 milliGRAM(s) IV Push once PRN    famotidine    Tablet 20 milliGRAM(s) Oral daily      influenza  Vaccine (HIGH DOSE) 0.5 milliLiter(s) IntraMuscular once      PAST MEDICAL & SURGICAL HISTORY:  No pertinent past medical history      No significant past surgical history          FAMILY HISTORY:  No pertinent family history in first degree relatives        SOCIAL HISTORY:    [ x] Non-smoker  [ ] Smoker  [ ] Alcohol      REVIEW OF SYSTEMS:  CONSTITUTIONAL: No fever, weight loss, or fatigue  EYES: No eye pain, visual disturbances, or discharge  ENMT:  No difficulty hearing, tinnitus, vertigo; No sinus or throat pain  NECK: No pain or stiffness  RESPIRATORY: No cough, wheezing, chills or hemoptysis; No Shortness of Breath  CARDIOVASCULAR: No chest pain, palpitations, passing out, dizziness, or leg swelling  GASTROINTESTINAL: No abdominal or epigastric pain. No nausea, vomiting, or hematemesis; No diarrhea or constipation. No melena or hematochezia.  GENITOURINARY: No dysuria, frequency, hematuria, or incontinence  NEUROLOGICAL: No headaches, memory loss, loss of strength, numbness, or tremors  SKIN: No itching, burning, rashes, or lesions   LYMPH Nodes: No enlarged glands  ENDOCRINE: No heat or cold intolerance; No hair loss  MUSCULOSKELETAL: No joint pain or swelling; No muscle, back, or extremity pain  PSYCHIATRIC: No depression, anxiety, mood swings, or difficulty sleeping  HEME/LYMPH: No easy bruising, or bleeding gums  ALLERY AND IMMUNOLOGIC: No hives or eczema	    [ ] All others negative	  [ ] Unable to obtain    PHYSICAL EXAM:  T(C): 36.8 (12-11-24 @ 05:50), Max: 36.8 (12-10-24 @ 16:15)  HR: 57 (12-11-24 @ 05:50) (50 - 66)  BP: 107/61 (12-11-24 @ 05:50) (93/61 - 107/70)  RR: 17 (12-11-24 @ 05:50) (14 - 18)  SpO2: 98% (12-11-24 @ 05:50) (96% - 98%)  Wt(kg): --  I&O's Summary      Appearance: Normal	  HEENT:   Normal oral mucosa, PERRL, EOMI	  Lymphatic: No lymphadenopathy  Cardiovascular: Normal S1 S2, normal JVP, No murmurs, No edema and is warm b/l.   Respiratory: Lungs clear to auscultation	  Psychiatry: A & O x 3, Mood & affect appropriate  Gastrointestinal:  Soft, Non-tender, + BS	  Skin: No rashes, No ecchymoses, No cyanosis	  Neurologic: Non-focal  Extremities: Normal range of motion, No clubbing, cyanosis or edema  Vascular: Peripheral pulses palpable 2+ bilaterally        LABS:	 	    CBC Full  -  ( 11 Dec 2024 03:45 )  WBC Count : 6.45 K/uL  Hemoglobin : 14.0 g/dL  Hematocrit : 42.5 %  Platelet Count - Automated : 226 K/uL  Mean Cell Volume : 81.7 fL  Mean Cell Hemoglobin : 26.9 pg  Mean Cell Hemoglobin Concentration : 32.9 g/dL  Auto Neutrophil # : x  Auto Lymphocyte # : x  Auto Monocyte # : x  Auto Eosinophil # : x  Auto Basophil # : x  Auto Neutrophil % : x  Auto Lymphocyte % : x  Auto Monocyte % : x  Auto Eosinophil % : x  Auto Basophil % : x    12-11    138  |  106  |  25[H]  ----------------------------<  119[H]  4.0   |  19[L]  |  1.22  12-10    138  |  105  |  27[H]  ----------------------------<  99  4.2   |  23  |  1.26    Ca    8.6      11 Dec 2024 03:45  Ca    9.0      10 Dec 2024 04:20  Phos  3.8     12-11  Phos  4.0     12-10  Mg     2.00     12-11  Mg     2.00     12-10    TPro  6.6  /  Alb  3.5  /  TBili  0.2  /  DBili  x   /  AST  15  /  ALT  17  /  AlkPhos  94  12-10      < from: GERALDINE W or WO Ultrasound Enhancing Agent (11.12.24 @ 13:14) >    CONCLUSIONS:      1. Left ventricular systolic function is severely decreased. Global left ventricular hypokinesis.   2. Enlarged right ventricular cavity size and reduced right ventricular systolic function.   3. There is mitral valve thickening of the anterior and posterior leaflets. There is moderate mitral regurgitation. Vena contracta width ~ 0.4-0.5 cm. Estimated effective regurgitant orifice area (EROA) ~ 0.34 cm2 (by the PISA method).   4. The aortic valve appears trileaflet with normal systolic excursion. There is calcification of the aortic valve leaflets. There is no evidence of aortic regurgitation.   5. No atheroma in the visualized portions of the proximal ascending aorta. No atheroma in the visualized portions of the transverse aortic arch. No atheroma in the visualized portions of the descending aorta.   6. The left atrium is dilated. There is no evidence of left atrial or left atrial appendage thrombus.   7. Agitated saline injection was negative for intracardiac shunt.      < end of copied text >

## 2024-12-11 NOTE — PROGRESS NOTE ADULT - PROBLEM SELECTOR PLAN 5
Large, firm anterior cervical lymph node for several weeks  - will check US neck
Patient reports left enlarged submandibular lymph node, nontender on exam    US performed but seems to have only been performed on right neck, results with "Targeted ultrasound in the right neck in the indicated region of concern demonstrates a level 1 lymph nodes measuring 0.6 x 0.4 x 0.8 cm and 0.9 x   0.4 x 0.9 cm, respectively."    - Will check left neck US to evaluate enlarged LN of concern, f/u results
Patient reports left enlarged submandibular lymph node, nontender on exam    Right neck US with "Targeted ultrasound in the right neck in the indicated region of concern demonstrates a level 1 lymph nodes measuring 0.6 x 0.4 x 0.8 cm and 0.9 x 0.4 x 0.9 cm, respectively."    Left neck US with "A left cervical level 1 anechoic structure, most likely a lymph node measuring 1.3 cm."     Would re-evaluate lymph nodes in 3-4 weeks. If persistent/enlarging - would consider biopsy to further evaluate. Close outpatient follow up for continued management.
Patient reports left enlarged submandibular lymph node, nontender on exam    Right neck US with "Targeted ultrasound in the right neck in the indicated region of concern demonstrates a level 1 lymph nodes measuring 0.6 x 0.4 x 0.8 cm and 0.9 x 0.4 x 0.9 cm, respectively."    Left neck US with "A left cervical level 1 anechoic structure, most likely a lymph node measuring 1.3 cm."     Patient reports stable weight- no unexpected weight loss. No night sweats. No other b-symptoms. Discussed extensively with patient, plan to re-evaluate lymph nodes in 3-4 weeks with PCP. If persistent/enlarging - would consider biopsy to further evaluate to ensure not malignant. Patient understands and plans to follow up closely with PCP after discharge. Close outpatient follow up for continued management.

## 2024-12-11 NOTE — PROVIDER CONTACT NOTE (OTHER) - DATE AND TIME:
09-Dec-2024 18:45
07-Dec-2024 23:55
11-Dec-2024 02:10
07-Dec-2024 22:25
10-Dec-2024 22:35
11-Dec-2024 17:09

## 2024-12-11 NOTE — PROGRESS NOTE ADULT - SUBJECTIVE AND OBJECTIVE BOX
Cardiology Progress Note  ------------------------------------------------------------------------------------------  SUBJECTIVE:   - Tele: NSR  - No events overnight  -------------------------------------------------------------------------------------------    VS:  T(F): 98.4 (12-09), Max: 98.4 (12-09)  HR: 57 (12-09) (53 - 67)  BP: 95/59 (12-09) (93/57 - 103/63)  RR: 16 (12-09)  SpO2: 96% (12-09)  I&O's Summary    08 Dec 2024 07:01  -  09 Dec 2024 07:00  --------------------------------------------------------  IN: 1030 mL / OUT: 0 mL / NET: 1030 mL    09 Dec 2024 07:01  -  09 Dec 2024 12:00  --------------------------------------------------------  IN: 200 mL / OUT: 0 mL / NET: 200 mL      PHYSICAL EXAM:  GENERAL: NAD  HEAD:  Atraumatic, Normocephalic.  EYES: EOMI, PERRLA, conjunctiva and sclera clear.  ENT: Moist mucous membranes.  NECK: Supple, No JVD.  CHEST/LUNG: Clear to auscultation bilaterally; No rales, rhonchi, wheezing, or rubs. Unlabored respirations.  HEART: Regular rate and rhythm; No murmurs, rubs, or gallops.  ABDOMEN: Bowel sounds present; Soft, Nontender, Nondistended.   EXTREMITIES: No edema. 2+ Peripheral Pulses, brisk capillary refill. No clubbing or cyanosis.   PSYCH: Normal affect.  SKIN: No rashes or lesions.  -------------------------------------------------------------------------------------------  LABS:                          14.9   6.55  )-----------( 258      ( 09 Dec 2024 06:00 )             45.1     12-09    135  |  102  |  29[H]  ----------------------------<  80  4.0   |  22  |  1.28    Ca    9.1      09 Dec 2024 06:00  Phos  3.9     12-09  Mg     2.00     12-09    TPro  6.5  /  Alb  3.5  /  TBili  0.6  /  DBili  x   /  AST  15  /  ALT  14  /  AlkPhos  89  12-09    PT/INR - ( 07 Dec 2024 18:08 )   PT: 12.4 sec;   INR: 1.07 ratio         PTT - ( 07 Dec 2024 18:08 )  PTT:34.8 sec  CARDIAC MARKERS ( 07 Dec 2024 21:06 )  8 ng/L / x     / x     / x     / x     / x      CARDIAC MARKERS ( 07 Dec 2024 18:08 )  10 ng/L / x     / x     / x     / x     / x                -------------------------------------------------------------------------------------------  Meds:  aMIOdarone    Tablet 200 milliGRAM(s) Oral daily  apixaban 5 milliGRAM(s) Oral every 12 hours  dapagliflozin 10 milliGRAM(s) Oral daily  famotidine    Tablet 20 milliGRAM(s) Oral daily  influenza  Vaccine (HIGH DOSE) 0.5 milliLiter(s) IntraMuscular once  melatonin 3 milliGRAM(s) Oral at bedtime    -------------------------------------------------------------------------------------------

## 2024-12-11 NOTE — PROGRESS NOTE ADULT - ASSESSMENT
70 y/o M with pmh of recently diagnosed biv HFrEF (EF 26%) in Nov-on GDMT, paroxysmal atrial flutter on Eliquis s/p DCCV 11/2024, vertigo p/w dizziness and hypotension when getting up from seated position. C/W orthostatic hypotension although initial orthostatics negative. EP consulted as pt scheduled for CTI ablation and implantable loop recorder implantation with Dr. Quintanilla this week.  Patient took Farxiga on 12/9.  beta-hydraxy butyrate <0.0 mmol/L noted.  SBP improved (~100) after hold Entresto/Furosemide since admission. s/p AFL ablation and ([employer] )loop recorder on 12/10    Recommendations:  Post ablation and loop recorder implantation teaching with instructions provided.  Patient verbalized understanding.    Follow up with Dr. Quintanilla on 1/9/2025 at 8:15 am.  Stable for discharge home.   -follow up with HF for resuming GDMT  -d/cd  amiodarone per EP attending  -C/W Eliquis BID and OK to continue low dose beta blocker Metoprolol 12.5 mg bid (hold for SBP <95 or HR <50)  -Stable from EP perspective for discharge home today after seeing by HF (notified HF)

## 2024-12-11 NOTE — PROGRESS NOTE ADULT - PROBLEM SELECTOR PLAN 4
- Entresto, spironolactone currently held  - f/u HF cardiology recs
- holding Entresto and furosemide as above   - f/u additional cardiology recs
- holding Entresto and furosemide as above   - will f/u cardiology recs   - plan to gradually reintroduce GDMT as tolerated
- holding Entresto and furosemide as above   - f/u additional cardiology recs

## 2024-12-11 NOTE — PROVIDER CONTACT NOTE (OTHER) - ACTION/TREATMENT ORDERED:
ACP notified.
acp made aware
acp made aware
ACP notified.
Acp made aware. No interventions ordered at this time
ACP Hieu Dowd notified, care is ongoing

## 2024-12-11 NOTE — SWALLOW BEDSIDE ASSESSMENT ADULT - ADDITIONAL RECOMMENDATIONS
Medical team advised to reconsult this service if there is change in patient's medical status impacting oral/nutritional intake and/or change in tolerance of recommended PO diet. This service to follow as schedule permits to assess diet tolerance.

## 2024-12-11 NOTE — PROGRESS NOTE ADULT - PROBLEM SELECTOR PLAN 6
DVT: Eliquis  Diet: Regular  Dispo: Home

## 2024-12-12 ENCOUNTER — TRANSCRIPTION ENCOUNTER (OUTPATIENT)
Age: 69
End: 2024-12-12

## 2024-12-12 VITALS
TEMPERATURE: 98 F | HEART RATE: 58 BPM | OXYGEN SATURATION: 98 % | DIASTOLIC BLOOD PRESSURE: 63 MMHG | SYSTOLIC BLOOD PRESSURE: 127 MMHG | RESPIRATION RATE: 18 BRPM

## 2024-12-12 LAB
ANION GAP SERPL CALC-SCNC: 10 MMOL/L — SIGNIFICANT CHANGE UP (ref 7–14)
BUN SERPL-MCNC: 24 MG/DL — HIGH (ref 7–23)
CALCIUM SERPL-MCNC: 9 MG/DL — SIGNIFICANT CHANGE UP (ref 8.4–10.5)
CHLORIDE SERPL-SCNC: 107 MMOL/L — SIGNIFICANT CHANGE UP (ref 98–107)
CO2 SERPL-SCNC: 21 MMOL/L — LOW (ref 22–31)
CREAT SERPL-MCNC: 1.14 MG/DL — SIGNIFICANT CHANGE UP (ref 0.5–1.3)
EGFR: 69 ML/MIN/1.73M2 — SIGNIFICANT CHANGE UP
GLUCOSE SERPL-MCNC: 97 MG/DL — SIGNIFICANT CHANGE UP (ref 70–99)
HCT VFR BLD CALC: 43.2 % — SIGNIFICANT CHANGE UP (ref 39–50)
HGB BLD-MCNC: 14.1 G/DL — SIGNIFICANT CHANGE UP (ref 13–17)
MAGNESIUM SERPL-MCNC: 2 MG/DL — SIGNIFICANT CHANGE UP (ref 1.6–2.6)
MCHC RBC-ENTMCNC: 26.6 PG — LOW (ref 27–34)
MCHC RBC-ENTMCNC: 32.6 G/DL — SIGNIFICANT CHANGE UP (ref 32–36)
MCV RBC AUTO: 81.4 FL — SIGNIFICANT CHANGE UP (ref 80–100)
NRBC # BLD: 0 /100 WBCS — SIGNIFICANT CHANGE UP (ref 0–0)
NRBC # FLD: 0 K/UL — SIGNIFICANT CHANGE UP (ref 0–0)
PHOSPHATE SERPL-MCNC: 3.4 MG/DL — SIGNIFICANT CHANGE UP (ref 2.5–4.5)
PLATELET # BLD AUTO: 218 K/UL — SIGNIFICANT CHANGE UP (ref 150–400)
POTASSIUM SERPL-MCNC: 4 MMOL/L — SIGNIFICANT CHANGE UP (ref 3.5–5.3)
POTASSIUM SERPL-SCNC: 4 MMOL/L — SIGNIFICANT CHANGE UP (ref 3.5–5.3)
RBC # BLD: 5.31 M/UL — SIGNIFICANT CHANGE UP (ref 4.2–5.8)
RBC # FLD: 13.1 % — SIGNIFICANT CHANGE UP (ref 10.3–14.5)
SODIUM SERPL-SCNC: 138 MMOL/L — SIGNIFICANT CHANGE UP (ref 135–145)
WBC # BLD: 6.51 K/UL — SIGNIFICANT CHANGE UP (ref 3.8–10.5)
WBC # FLD AUTO: 6.51 K/UL — SIGNIFICANT CHANGE UP (ref 3.8–10.5)

## 2024-12-12 PROCEDURE — 99231 SBSQ HOSP IP/OBS SF/LOW 25: CPT

## 2024-12-12 PROCEDURE — 99233 SBSQ HOSP IP/OBS HIGH 50: CPT

## 2024-12-12 PROCEDURE — 99239 HOSP IP/OBS DSCHRG MGMT >30: CPT

## 2024-12-12 RX ORDER — SACUBITRIL AND VALSARTAN 24; 26 MG/1; MG/1
1 TABLET, FILM COATED ORAL
Qty: 60 | Refills: 0
Start: 2024-12-12 | End: 2025-01-10

## 2024-12-12 RX ORDER — METOPROLOL TARTRATE 100 MG/1
0.5 TABLET, FILM COATED ORAL
Qty: 30 | Refills: 0
Start: 2024-12-12 | End: 2025-01-10

## 2024-12-12 RX ORDER — APIXABAN 2.5 MG/1
1 TABLET, FILM COATED ORAL
Qty: 60 | Refills: 0
Start: 2024-12-12 | End: 2025-01-10

## 2024-12-12 RX ORDER — DAPAGLIFLOZIN 10 MG/1
1 TABLET, FILM COATED ORAL
Qty: 30 | Refills: 0
Start: 2024-12-12 | End: 2025-01-10

## 2024-12-12 RX ORDER — FAMOTIDINE 20 MG/1
1 TABLET, FILM COATED ORAL
Qty: 30 | Refills: 0
Start: 2024-12-12 | End: 2025-01-10

## 2024-12-12 RX ORDER — SPIRONOLACTONE 25 MG
0.5 TABLET ORAL
Qty: 15 | Refills: 0
Start: 2024-12-12 | End: 2025-01-10

## 2024-12-12 RX ADMIN — APIXABAN 5 MILLIGRAM(S): 2.5 TABLET, FILM COATED ORAL at 09:30

## 2024-12-12 RX ADMIN — SACUBITRIL AND VALSARTAN 1 TABLET(S): 24; 26 TABLET, FILM COATED ORAL at 05:22

## 2024-12-12 NOTE — DISCHARGE NOTE PROVIDER - NSDCFUSCHEDAPPT_GEN_ALL_CORE_FT
Adilia Akhtar  [facility]  [facility] [address]  Scheduled Appointment: 12/20/2024    [facility]  [facility] [phone]  Scheduled Appointment: 12/27/2024    Adilia Akhtar  [facility]  [facility] [address]  Scheduled Appointment: 01/02/2025    Quentin Quintanilla  [facility]  [phone]  Scheduled Appointment: 01/09/2025    [facility]  [facility] [phone]  Scheduled Appointment: 01/16/2025

## 2024-12-12 NOTE — PROGRESS NOTE ADULT - REASON FOR ADMISSION
dizziness, low BP

## 2024-12-12 NOTE — DISCHARGE NOTE NURSING/CASE MANAGEMENT/SOCIAL WORK - PATIENT PORTAL LINK FT
You can access the FollowMyHealth Patient Portal offered by [facility] by registering at the following website: [email or web address]. By joining [employer]’s FollowMyHealth portal, you will also be able to view your health information using other applications (apps) compatible with our system. Abnormal VS & WBC

## 2024-12-12 NOTE — DISCHARGE NOTE PROVIDER - ATTENDING DISCHARGE PHYSICAL EXAMINATION:
T 98.2 HR 58 /63 RR 18 98% RA  CONSTITUTIONAL: NAD, laying in bed  EYES: conjunctiva and sclera clear  ENMT: Moist oral mucosa  RESPIRATORY: Normal respiratory effort; lungs are clear to auscultation bilaterally  CARDIOVASCULAR: Regular rate and rhythm, normal S1 and S2, No lower extremity edema  ABDOMEN: Nontender to palpation, normoactive bowel sounds, no rebound/guarding  PSYCH: calm

## 2024-12-12 NOTE — DISCHARGE NOTE PROVIDER - NSDCMRMEDTOKEN_GEN_ALL_CORE_FT
amiodarone 200 mg oral tablet: 1 tab(s) orally once a day Please take 2 tablets (400mg) every 8 hours until Saturday 11/16, then take 1 tablet (200mg) daily starting Sunday 11/17  apixaban 5 mg oral tablet: 1 tab(s) orally every 12 hours  dapagliflozin 10 mg oral tablet: 1 tab(s) orally once a day  furosemide 40 mg oral tablet: 1 tab(s) orally once a day x 30 days as needed for weight gain Please take 1 tab once a day if you experience weight gain of 2-3 pounds within 24hrs, swelling of your legs or shortness of breath. Please also call your cardiologist if you experience these symptoms  metoprolol succinate 25 mg oral tablet, extended release: 0.5 tab(s) orally every 12 hours Take 12.5mg (1/2 tablet) every 12 hours.  sacubitril-valsartan 24 mg-26 mg oral tablet: 1 tab(s) orally 2 times a day  spironolactone 25 mg oral tablet: 1 tab(s) orally once a day   apixaban 5 mg oral tablet: 1 tab(s) orally every 12 hours  dapagliflozin 10 mg oral tablet: 1 tab(s) orally once a day  famotidine 20 mg oral tablet: 1 tab(s) orally once a day  metoprolol succinate 25 mg oral tablet, extended release: 0.5 tab(s) orally every 12 hours Take 12.5mg (1/2 tablet) every 12 hours.  sacubitril-valsartan 24 mg-26 mg oral tablet: 1 tab(s) orally 2 times a day  spironolactone 25 mg oral tablet: 0.5 tab(s) orally once a day

## 2024-12-12 NOTE — DISCHARGE NOTE PROVIDER - NSDCCPTREATMENT_GEN_ALL_CORE_FT
PRINCIPAL PROCEDURE  Procedure: Ultrasound of neck  Findings and Treatment: Right neck US with "Targeted ultrasound in the right neck in the indicated region of concern demonstrates a level 1 lymph nodes measuring 0.6 x 0.4 x 0.8 cm and 0.9 x 0.4 x 0.9 cm, respectively."  Left neck US with "A left cervical level 1 anechoic structure, most likely a lymph node measuring 1.3 cm."

## 2024-12-12 NOTE — PROGRESS NOTE ADULT - ASSESSMENT
70 y/o M with pmhx of recently diagnosed  HFrEF (EF 26%) in Nov-on GDMT, paroxysmal atrial flutter on Eliquis s/p DCCV 11/2024, positional vertigo and orthostatic hypotension who presented for an ablation.  Now s/p CTI flutter ablation and implantable loop recorder implantation with Dr. Quintanilla. Tolerated the procedure well. No immediate complications.      Post ablation and loop recorder implantation teaching with instructions provided.  Patient verbalized understanding.    Follow up with Dr. Quintanilla on 1/9/2025 at 8:15 am.  Stable for discharge home.   -follow up with HF for resuming GDMT  -d/cd  amiodarone per EP attending  -C/W Eliquis BID x 2 weeks.  -continue low dose beta blocker Metoprolol 12.5 mg bid (hold for SBP <95 or HR <50)  -Stable from EP perspective for discharge home.    68 y/o M with pmhx of recently diagnosed  HFrEF (EF 26%) in November-on GDMT, paroxysmal atrial flutter on Eliquis s/p DCCV 11/2024, positional vertigo and orthostatic hypotension who presented for an ablation.  Now s/p CTI flutter ablation and implantable loop recorder implantation with Dr. Quintanilla. Tolerated the procedure well. No immediate complications.      Post ablation and loop recorder implantation teaching done.  Written instructions and contact information provided.  Patient verbalized understanding.   -d/cd  amiodarone per EP attending  -C/W Eliquis BID   -continue low dose beta blocker Metoprolol 12.5 mg bid (hold for SBP <95 or HR <50)  -Stable from EP perspective for discharge home.    Follow up with Dr. Quintanilla on 1/9/2025 at 8:15 am.  Stable for discharge home.    68 y/o M with pmhx of recently diagnosed  HFrEF (EF 26%) in November-on GDMT, paroxysmal atrial flutter on Eliquis s/p DCCV 11/2024, positional vertigo and orthostatic hypotension who presented for an ablation.  Now s/p CTI flutter ablation and implantable loop recorder implantation with Dr. Quintanilla. Tolerated the procedure well. No immediate complications.      Post ablation and loop recorder implantation teaching done.  Written instructions and contact information provided.  Patient verbalized understanding.   Patient was given a home monitor with verbal and written instructions.   -d/cd  amiodarone per EP attending  -C/W Eliquis BID   -continue low dose beta blocker Metoprolol 12.5 mg bid (hold for SBP <95 or HR <50)  -Stable from EP perspective for discharge home.    Follow up with Dr. Quintanilla on 1/9/2025 at 8:15 am.  Stable for discharge home.

## 2024-12-12 NOTE — CHART NOTE - NSCHARTNOTEFT_GEN_A_CORE
Patient cleared for discharge from HF perspective. Followup appointment in HF clinic on 1/16/2025 at 8:30am
Pt s/p atrial flutter ablation, RFV access. Eliquis to resume tonight as per Dr. Quintanilla, ordered. No PPI.
Patient is s/p ablation via right femoral access. Patient without any complaints. Site is stable with no hematoma or active bleed noted. No swelling, dressing is clean/intact/dry. Right femoral pulse palpable. Strength is equal bilaterally. Patient has full ROM in LE. Capillary refill < 2 seconds. Will continue to monitor closely. Eliquis restarted per EP recommendations.     aCtalina Armenta PA-C
Pt discussed on EP rounds. Plan for CTI ablation with Dr. Quintanilla tomorrow. Please obtain type and screen (ordered), hold farxiga, make NPO after midnight and order CMP and BHB for the AM.    Regarding active management, please hold amiodarone and restart beta blocker (toprol 12.5mg BID).       Tomy Lema, PGY-5  Cardiology fellow

## 2024-12-12 NOTE — DISCHARGE NOTE PROVIDER - NSDCCPCAREPLAN_GEN_ALL_CORE_FT
PRINCIPAL DISCHARGE DIAGNOSIS  Diagnosis: Dizziness  Assessment and Plan of Treatment: You presented with dizziness and you were evaluated by the cardiology doctors at the hospital. Your medications were adjusted and you underwent a cardiac ablation and loop recorder implanation. Please follow up with Cardiology after hospital discharge, and take all medications as instructed.      SECONDARY DISCHARGE DIAGNOSES  Diagnosis: Cervical lymphadenopathy  Assessment and Plan of Treatment: You reported swollen neck lymph nodes and you underwent neck ultrasound which did reveal enlarged lymph nodes. Please follow up with your primary doctor to have your neck lymph nodes re-evaluated within 3 to 4 weeks. If the lymph nodes remain enlarged, would recommend discussing a biopsy of the lymph node as sometimes an enlarged lymph node could be a sign of something more serious such as cancer.

## 2024-12-12 NOTE — DISCHARGE NOTE PROVIDER - HOSPITAL COURSE
68 y/o M with pmh of CHF (EF 26%), atrial flutter on Eliquis s/p DCCV 11/2024, vertigo p/w dizziness and hypotension, admitted for further evaluation.    #Dizziness:   - S/p ablation and loop implantation by EP 12/10/24  - HF consult appreciated, discharge home on entresto 24/26 BID, toprol 12.5 BID, spironolactone 12.5mg qd, farxiga 10mg qd. Off lasix.   - Close OP cardiology follow up      #Sinus bradycardia/Paroxysmal atrial flutter.   -TFTs obtained, WNL  - S/p ablation and loop implantation by EP 12/10/24  - EP recommending DC amiodarone  - c/w toprol 12.5 BID  - C/w eliquis.    #Chronic systolic congestive heart failure.    HF consult appreciated, discharge home on entresto 24/26 BID, toprol 12.5 BID, spironolactone 12.5mg qd, farxiga 10mg qd. Off lasix.     #Cervical lymphadenopathy.   Patient reports left enlarged submandibular lymph node, nontender on exam    Right neck US with "Targeted ultrasound in the right neck in the indicated region of concern demonstrates a level 1 lymph nodes measuring 0.6 x 0.4 x 0.8 cm and 0.9 x 0.4 x 0.9 cm, respectively."    Left neck US with "A left cervical level 1 anechoic structure, most likely a lymph node measuring 1.3 cm."     Patient reports stable weight- no unexpected weight loss. No night sweats. No other b-symptoms. Discussed extensively with patient, plan to re-evaluate lymph nodes in 3-4 weeks with PCP. If persistent/enlarging - would consider biopsy to further evaluate to ensure not malignant. Patient understands and plans to follow up closely with PCP after discharge. Close outpatient follow up for continued management.

## 2024-12-12 NOTE — PROGRESS NOTE ADULT - PROVIDER SPECIALTY LIST ADULT
Cardiology
Electrophysiology
Electrophysiology
Cardiology
Cardiology
Electrophysiology
Cardiology
Hospitalist

## 2024-12-12 NOTE — DISCHARGE NOTE PROVIDER - PROVIDER TOKENS
FREE:[LAST:[Primary Care Doctor],PHONE:[(   )    -],FAX:[(   )    -],FOLLOWUP:[1 week]],PROVIDER:[TOKEN:[[id number]],FOLLOWUP:[1 month]]

## 2024-12-12 NOTE — PROGRESS NOTE ADULT - ATTENDING COMMENTS
personally saw and examined patient  labs and vitals reviewed  agree with above assessment and plan  pt well appearing, still with malaise  unclear etiology of acute change in symptoms  denies cp or sob   does not appear hypervolemic  no syncope here  plan for flutter ablation with EP on this admission  will cont to follow as well
personally saw and examined pt  labs and vitals reviewed  agree with above assessment and plan  pt feeling well s/p ablation  euvolemic  no complaints this am  known severely reduced lv systolic dys and at least moderate MR  case discussed with structural heart team, they will reach out to schedule pt for eval, pt agreeable  cont inpt care per EP  outpt general cards follow up with me within 1-2 weeks
personally saw and examined patient  labs and vitals reviewed  agree with above assessment and plan  pt feeling well, no complaints  denies dizziness  HF regimen re-introduced, bp stable  if pt remains asymptomatic with stable BP today, no objections to d/c with outpt f/u
personally saw and examined pt  labs and vitals reviewed   agree with above assessment and plan  plan for ablation today  will need structural heart eval as out upon d/c for MR

## 2024-12-12 NOTE — DISCHARGE NOTE NURSING/CASE MANAGEMENT/SOCIAL WORK - FINANCIAL ASSISTANCE
[facility] provides services at a reduced cost to those who are determined to be eligible through [facility]’s financial assistance program. Information regarding [facility]’s financial assistance program can be found by going to [email or web address] or by calling [phone].

## 2024-12-12 NOTE — PROGRESS NOTE ADULT - NS ATTEND AMEND GEN_ALL_CORE FT
S/P CTI ablation + ILR implant 12/10. C/W Eliquis 5mg bid. C/W beta blocker. No plans to resume amiodarone.
Plan for CTI ablation today for documented TAFL from previous admission. Will implant ILR on the table post ablation for longterm atrial fibrillation monitoring. C/W Eliquis 5mg bid. The patient had a GERALDINE during his prior admission and has remained on uninterrupted anticoagulation since, so we will defer any pre-procedural imaging. C/W beta blocker. No plans to resume amiodarone.
Doing well post ablation. Continue AC. D/c charge home. EP to sign off.

## 2024-12-12 NOTE — PROGRESS NOTE ADULT - SUBJECTIVE AND OBJECTIVE BOX
Cardiology Progress Note  ------------------------------------------------------------------------------------------  SUBJECTIVE:   - No events overnight.   -------------------------------------------------------------------------------------------    VS:  T(F): 98 (12-12), Max: 98.4 (12-11)  HR: 50 (12-12) (50 - 62)  BP: 125/79 (12-12) (101/55 - 125/79)  RR: 18 (12-12)  SpO2: 98% (12-12)  I&O's Summary    11 Dec 2024 07:01  -  12 Dec 2024 07:00  --------------------------------------------------------  IN: 972 mL / OUT: 0 mL / NET: 972 mL      PHYSICAL EXAM:  GENERAL: NAD  HEAD:  Atraumatic, Normocephalic.  EYES: EOMI, PERRLA, conjunctiva and sclera clear.  ENT: Moist mucous membranes.  NECK: Supple, No JVD.  CHEST/LUNG: Clear to auscultation bilaterally; No rales, rhonchi, wheezing, or rubs. Unlabored respirations.  HEART: Regular rate and rhythm; No murmurs, rubs, or gallops.  ABDOMEN: Bowel sounds present; Soft, Nontender, Nondistended.   EXTREMITIES: No edema. 2+ Peripheral Pulses, brisk capillary refill. No clubbing or cyanosis.   PSYCH: Normal affect.  SKIN: No rashes or lesions.  -------------------------------------------------------------------------------------------  LABS:                          14.1   6.51  )-----------( 218      ( 12 Dec 2024 05:45 )             43.2     12-12    138  |  107  |  24[H]  ----------------------------<  97  4.0   |  21[L]  |  1.14    Ca    9.0      12 Dec 2024 05:45  Phos  3.4     12-12  Mg     2.00     12-12        CARDIAC MARKERS ( 07 Dec 2024 21:06 )  8 ng/L / x     / x     / x     / x     / x      CARDIAC MARKERS ( 07 Dec 2024 18:08 )  10 ng/L / x     / x     / x     / x     / x                -------------------------------------------------------------------------------------------  Meds:  apixaban 5 milliGRAM(s) Oral every 12 hours  famotidine    Tablet 20 milliGRAM(s) Oral daily  fentaNYL    Injectable 25 MICROGram(s) IV Push every 5 minutes PRN  influenza  Vaccine (HIGH DOSE) 0.5 milliLiter(s) IntraMuscular once  melatonin 3 milliGRAM(s) Oral at bedtime  metoprolol succinate ER 12.5 milliGRAM(s) Oral two times a day  ondansetron Injectable 4 milliGRAM(s) IV Push once PRN  sacubitril 24 mG/valsartan 26 mG 1 Tablet(s) Oral two times a day    -------------------------------------------------------------------------------------------

## 2024-12-12 NOTE — DISCHARGE NOTE PROVIDER - CARE PROVIDER_API CALL
Primary Care Doctor,   Phone: (   )    -  Fax: (   )    -  Follow Up Time: 1 week    Ernesto Silverio  Cardiovascular Disease  [address] [place] [address]  Phone: [phone]  Fax: [phone]  Follow Up Time: 1 month

## 2024-12-12 NOTE — PROGRESS NOTE ADULT - SUBJECTIVE AND OBJECTIVE BOX
Patient feeling well. Denies chest pain, shortness of breath, palpitations of lightheadedness.   Ambulating without difficulty.   Right groin without pain, bleeding or hematoma.       Vital Signs Last 24 Hrs  T(C): 36.7 (12 Dec 2024 05:20), Max: 36.9 (11 Dec 2024 12:34)  T(F): 98 (12 Dec 2024 05:20), Max: 98.4 (11 Dec 2024 12:34)  HR: 50 (12 Dec 2024 05:20) (50 - 62)  BP: 125/79 (12 Dec 2024 05:20) (101/55 - 125/79)  BP(mean): 91 (12 Dec 2024 05:20) (91 - 91)  RR: 18 (12 Dec 2024 05:20) (17 - 18)  SpO2: 98% (12 Dec 2024 05:20) (97% - 98%)    Parameters below as of 12 Dec 2024 05:20  Patient On (Oxygen Delivery Method): room air      Telemetry: Sinus rhythm with sinus bradycardia to the 40-50's overnight. No symptoms.     MEDICATIONS  (STANDING):  apixaban 5 milliGRAM(s) Oral every 12 hours  famotidine    Tablet 20 milliGRAM(s) Oral daily  influenza  Vaccine (HIGH DOSE) 0.5 milliLiter(s) IntraMuscular once  melatonin 3 milliGRAM(s) Oral at bedtime  metoprolol succinate ER 12.5 milliGRAM(s) Oral two times a day  sacubitril 24 mG/valsartan 26 mG 1 Tablet(s) Oral two times a day    MEDICATIONS  (PRN):  fentaNYL    Injectable 25 MICROGram(s) IV Push every 5 minutes PRN Moderate Pain (4 - 6)  ondansetron Injectable 4 milliGRAM(s) IV Push once PRN Nausea and/or Vomiting      Physical exam:   Gen- well developed well nourished in NAD  Resp- clear to auscultation. No wheezing, rales or rhonchi  CV- S1 and S2 RRR. No murmurs, gallops or rubs  ABD- soft nontender +bowel sounds  EXT- no edema no calf tenderness.   Neuro- grossly nonfocal                            14.1   6.51  )-----------( 218      ( 12 Dec 2024 05:45 )             43.2       12-12    138  |  107  |  24[H]  ----------------------------<  97  4.0   |  21[L]  |  1.14    Ca    9.0      12 Dec 2024 05:45  Phos  3.4     12-12  Mg     2.00     12-12          < from: GERALDINE W or WO Ultrasound Enhancing Agent (11.12.24 @ 13:14) >  TRANSESOPHAGEAL ECHOCARDIOGRAM REPORT  ________________________________________________________________________________                                      _______       Pt. Name:       EMANUEL POPE Study Date:    11/12/2024  MRN:[id number]          YOB: 1955  Accession #:    [id number]          Age:           69 years  Account#:       [id number]           Gender:        M  Heart Rate:                        Height:        72.00 in (182.88 cm)  Rhythm:              Weight:        183.00 lb (83.01 kg)  Blood Pressure: 145/99 mmHg        BSA/BMI:       2.05 m² / 24.82 kg/m²  ________________________________________________________________________________________  Referring Physician:    [id number] La Frazier  Interpreting Physician: Emanuel Khan M.D.  Primary Sonographer:    Emanuel Khan M.D.    CPT:               3D RECONST W/O WKSTATION - 92100.m;ECHO TRANSESOPH W/O CON -                     88996.m;DOPPLER ECHO COMP W SPECT - 70706.m;DOPPLECHO COLOR                     FLOW - 75420.m  Indication(s):     Unspecified atrial fibrillation - I48.91  Procedure:         Transesophageal echocardiogram performed with 2D, M-mode and                     complete spectral and color flow Doppler. Real time and full                     volume 3-dimensional imaging performed at the echo machine.  Ordering Location: [facility]  Admission Status:  Inpatient  Agitated Saline:   Injection with agitated saline was performed to evaluate for     intracardiac shunting.  Study Information: Image quality for this study is good.    _______________________________________________________________________________________     CONCLUSIONS:      1. Left ventricular systolic function is severely decreased. Global left ventricular hypokinesis.   2. Enlarged right ventricular cavity size and reduced right ventricular systolic function.   3. There is mitral valve thickening of the anterior and posterior leaflets. There is moderate mitral regurgitation. Vena contracta width ~ 0.4-0.5 cm. Estimated effective regurgitant orifice area (EROA) ~ 0.34 cm2 (by the PISA method).   4. The aortic valve appears trileaflet with normal systolic excursion. There is calcification of the aortic valve leaflets. There is no evidence of aortic regurgitation.   5. No atheroma in the visualized portions of the proximal ascending aorta. No atheroma in the visualized portions of the transverse aortic arch. No atheroma in the visualized portions of the descending aorta.   6. The left atrium is dilated. There is no evidence of left atrial or left atrial appendage thrombus.   7. Agitated saline injection was negative for intracardiac shunt.                   Patient feeling well. Denies chest pain, shortness of breath, palpitations of lightheadedness.   Ambulating without difficulty.   Right groin without pain, bleeding or hematoma.   ILR site without pain or bleeding.       Vital Signs Last 24 Hrs  T(C): 36.7 (12 Dec 2024 05:20), Max: 36.9 (11 Dec 2024 12:34)  T(F): 98 (12 Dec 2024 05:20), Max: 98.4 (11 Dec 2024 12:34)  HR: 50 (12 Dec 2024 05:20) (50 - 62)  BP: 125/79 (12 Dec 2024 05:20) (101/55 - 125/79)  BP(mean): 91 (12 Dec 2024 05:20) (91 - 91)  RR: 18 (12 Dec 2024 05:20) (17 - 18)  SpO2: 98% (12 Dec 2024 05:20) (97% - 98%)    Parameters below as of 12 Dec 2024 05:20  Patient On (Oxygen Delivery Method): room air      Telemetry: Sinus rhythm with sinus bradycardia to the 40-50's overnight. No symptoms.     MEDICATIONS  (STANDING):  apixaban 5 milliGRAM(s) Oral every 12 hours  famotidine    Tablet 20 milliGRAM(s) Oral daily  influenza  Vaccine (HIGH DOSE) 0.5 milliLiter(s) IntraMuscular once  melatonin 3 milliGRAM(s) Oral at bedtime  metoprolol succinate ER 12.5 milliGRAM(s) Oral two times a day  sacubitril 24 mG/valsartan 26 mG 1 Tablet(s) Oral two times a day    MEDICATIONS  (PRN):  fentaNYL    Injectable 25 MICROGram(s) IV Push every 5 minutes PRN Moderate Pain (4 - 6)  ondansetron Injectable 4 milliGRAM(s) IV Push once PRN Nausea and/or Vomiting      Physical exam:   Gen- well developed well nourished in NAD  Resp- clear to auscultation. No wheezing, rales or rhonchi  CV- S1 and S2 RRR. No murmurs, gallops or rubs  ABD- soft nontender +bowel sounds  EXT- no edema no calf tenderness.   Neuro- grossly nonfocal                            14.1   6.51  )-----------( 218      ( 12 Dec 2024 05:45 )             43.2       12-12    138  |  107  |  24[H]  ----------------------------<  97  4.0   |  21[L]  |  1.14    Ca    9.0      12 Dec 2024 05:45  Phos  3.4     12-12  Mg     2.00     12-12          < from: GERLADINE W or WO Ultrasound Enhancing Agent (11.12.24 @ 13:14) >  TRANSESOPHAGEAL ECHOCARDIOGRAM REPORT  ________________________________________________________________________________                                      _______       Pt. Name:       EMANUEL POPE Study Date:    11/12/2024  MRN:[id number]          YOB: 1955  Accession #:    [id number]          Age:           69 years  Account#:       [id number]           Gender:        M  Heart Rate:                        Height:        72.00 in (182.88 cm)  Rhythm:              Weight:        183.00 lb (83.01 kg)  Blood Pressure: 145/99 mmHg        BSA/BMI:       2.05 m² / 24.82 kg/m²  ________________________________________________________________________________________  Referring Physician:    [id number] La Frazier  Interpreting Physician: Emanuel Khan M.D.  Primary Sonographer:    Emanuel Khan M.D.    CPT:               3D RECONST W/O WKSTATION - 06027.m;ECHO TRANSESOPH W/O CON -                     94828.m;DOPPLER ECHO COMP W SPECT - 72781.m;DOPPLECHO COLOR                     FLOW - 91754.m  Indication(s):     Unspecified atrial fibrillation - I48.91  Procedure:         Transesophageal echocardiogram performed with 2D, M-mode and                     complete spectral and color flow Doppler. Real time and full                     volume 3-dimensional imaging performed at the echo machine.  Ordering Location: [facility]  Admission Status:  Inpatient  Agitated Saline:   Injection with agitated saline was performed to evaluate for     intracardiac shunting.  Study Information: Image quality for this study is good.    _______________________________________________________________________________________     CONCLUSIONS:      1. Left ventricular systolic function is severely decreased. Global left ventricular hypokinesis.   2. Enlarged right ventricular cavity size and reduced right ventricular systolic function.   3. There is mitral valve thickening of the anterior and posterior leaflets. There is moderate mitral regurgitation. Vena contracta width ~ 0.4-0.5 cm. Estimated effective regurgitant orifice area (EROA) ~ 0.34 cm2 (by the PISA method).   4. The aortic valve appears trileaflet with normal systolic excursion. There is calcification of the aortic valve leaflets. There is no evidence of aortic regurgitation.   5. No atheroma in the visualized portions of the proximal ascending aorta. No atheroma in the visualized portions of the transverse aortic arch. No atheroma in the visualized portions of the descending aorta.   6. The left atrium is dilated. There is no evidence of left atrial or left atrial appendage thrombus.   7. Agitated saline injection was negative for intracardiac shunt.                   Patient feeling well. Denies chest pain, shortness of breath, palpitations of lightheadedness.   Ambulating without difficulty.   Right groin without pain, bleeding or hematoma.   ILR site without pain or bleeding.   No events overnight.      Vital Signs Last 24 Hrs  T(C): 36.7 (12 Dec 2024 05:20), Max: 36.9 (11 Dec 2024 12:34)  T(F): 98 (12 Dec 2024 05:20), Max: 98.4 (11 Dec 2024 12:34)  HR: 50 (12 Dec 2024 05:20) (50 - 62)  BP: 125/79 (12 Dec 2024 05:20) (101/55 - 125/79)  BP(mean): 91 (12 Dec 2024 05:20) (91 - 91)  RR: 18 (12 Dec 2024 05:20) (17 - 18)  SpO2: 98% (12 Dec 2024 05:20) (97% - 98%)    Parameters below as of 12 Dec 2024 05:20  Patient On (Oxygen Delivery Method): room air      Telemetry: Sinus rhythm with sinus bradycardia to the 40-50's overnight. No symptoms.     MEDICATIONS  (STANDING):  apixaban 5 milliGRAM(s) Oral every 12 hours  famotidine    Tablet 20 milliGRAM(s) Oral daily  influenza  Vaccine (HIGH DOSE) 0.5 milliLiter(s) IntraMuscular once  melatonin 3 milliGRAM(s) Oral at bedtime  metoprolol succinate ER 12.5 milliGRAM(s) Oral two times a day  sacubitril 24 mG/valsartan 26 mG 1 Tablet(s) Oral two times a day    MEDICATIONS  (PRN):  fentaNYL    Injectable 25 MICROGram(s) IV Push every 5 minutes PRN Moderate Pain (4 - 6)  ondansetron Injectable 4 milliGRAM(s) IV Push once PRN Nausea and/or Vomiting      Physical exam:   Gen- well developed well nourished in NAD  Resp- clear to auscultation. No wheezing, rales or rhonchi  CV- S1 and S2 RRR. No murmurs, gallops or rubs  ABD- soft nontender +bowel sounds  EXT- no edema no calf tenderness.   Neuro- grossly nonfocal                            14.1   6.51  )-----------( 218      ( 12 Dec 2024 05:45 )             43.2       12-12    138  |  107  |  24[H]  ----------------------------<  97  4.0   |  21[L]  |  1.14    Ca    9.0      12 Dec 2024 05:45  Phos  3.4     12-12  Mg     2.00     12-12          < from: GERALDINE W or WO Ultrasound Enhancing Agent (11.12.24 @ 13:14) >  TRANSESOPHAGEAL ECHOCARDIOGRAM REPORT  ________________________________________________________________________________                                      _______       Pt. Name:       EMANUEL POPE Study Date:    11/12/2024  MRN:[id number]          YOB: 1955  Accession #:    [id number]          Age:           69 years  Account#:       [id number]           Gender:        M  Heart Rate:                        Height:        72.00 in (182.88 cm)  Rhythm:              Weight:        183.00 lb (83.01 kg)  Blood Pressure: 145/99 mmHg        BSA/BMI:       2.05 m² / 24.82 kg/m²  ________________________________________________________________________________________  Referring Physician:    [id number] La Frazier  Interpreting Physician: Emanuel Khan M.D.  Primary Sonographer:    Emanuel Khan M.D.    CPT:               3D RECONST W/O WKSTATION - 36414.m;ECHO TRANSESOPH W/O CON -                     11292.m;DOPPLER ECHO COMP W SPECT - 89109.m;DOPPLECHO COLOR                     FLOW - 33880.m  Indication(s):     Unspecified atrial fibrillation - I48.91  Procedure:         Transesophageal echocardiogram performed with 2D, M-mode and                     complete spectral and color flow Doppler. Real time and full                     volume 3-dimensional imaging performed at the echo machine.  Ordering Location: [facility]  Admission Status:  Inpatient  Agitated Saline:   Injection with agitated saline was performed to evaluate for     intracardiac shunting.  Study Information: Image quality for this study is good.    _______________________________________________________________________________________     CONCLUSIONS:      1. Left ventricular systolic function is severely decreased. Global left ventricular hypokinesis.   2. Enlarged right ventricular cavity size and reduced right ventricular systolic function.   3. There is mitral valve thickening of the anterior and posterior leaflets. There is moderate mitral regurgitation. Vena contracta width ~ 0.4-0.5 cm. Estimated effective regurgitant orifice area (EROA) ~ 0.34 cm2 (by the PISA method).   4. The aortic valve appears trileaflet with normal systolic excursion. There is calcification of the aortic valve leaflets. There is no evidence of aortic regurgitation.   5. No atheroma in the visualized portions of the proximal ascending aorta. No atheroma in the visualized portions of the transverse aortic arch. No atheroma in the visualized portions of the descending aorta.   6. The left atrium is dilated. There is no evidence of left atrial or left atrial appendage thrombus.   7. Agitated saline injection was negative for intracardiac shunt.

## 2024-12-12 NOTE — PROGRESS NOTE ADULT - ASSESSMENT
68y/o M w PMH of HFrEF w EF 26% per echo 11/24, AFlutter on Eliquis s/p DCCV 11/2024, vertigo, presenting to ED due to dizziness and hypotension at home for which cardiology was consulted.     Pt says he had dizziness w lightheadedness earlier today at home and felt the symptoms progressively got worse and not relieved w rest. He felt symptoms were not similar to his usual vertigo. He checked BP at home and noticed SBP was in 60s and thus presented to ED. In ED, pts's BP has been in sin SBP 80-100s. Orthostatic vitals x 1 neg. Trop, pBNP x 1 neg. CXR unremarkable. Pt has been compliant w his GDMT at home and no recent changes to his meds. Pt says he has been feeling lacking energy at home recently and occasionally chest tightness that relives in few mins. Pt has not noticed any weight gain or decreased urine output.  He has been maintaining adequate fluid intake at home.     Assessment and Plan:  # Dizziness/hypotension- resolved  Pt feels dizziness w lightheadedness different from his usual vertigo.   SBP in 60s at home. Orthostatic vitals x 1 neg. EKG w no acute ischemic changes, Trop, pBNP x 1 neg. CXR unremarkable.   Pt does not appear volume overloaded. Low suspicion for cardiogenic shock or ACS.  S/p CTI ablation w/ EP on 12/10    - patient evaluated by HF team, appreciate recs  -- toprol 12.5mg bid, entresto 24-26mg bid, spironolactone 12.5mg qd, prn diuretics  - continue home eliquis    No further recommendations from cardiology perspective at this time  We will sign off, reach out as needed  On discharge, patient should follow up with:  - Dr Silverio of General Cardiology  - Dr Quintanilla of EP  - Dr Angel of Structural Cardiology- his office will call the patient to establish a new patient appt in 6-8 weeks for evaluation of mod-severe MR  - HF clinic

## 2024-12-16 ENCOUNTER — NON-APPOINTMENT (OUTPATIENT)
Age: 69
End: 2024-12-16

## 2024-12-20 ENCOUNTER — APPOINTMENT (OUTPATIENT)
Dept: SLEEP CENTER | Facility: CLINIC | Age: 69
End: 2024-12-20

## 2024-12-20 ENCOUNTER — NON-APPOINTMENT (OUTPATIENT)
Age: 69
End: 2024-12-20

## 2024-12-20 ENCOUNTER — OUTPATIENT (OUTPATIENT)
Dept: OUTPATIENT SERVICES | Facility: HOSPITAL | Age: 69
LOS: 1 days | End: 2024-12-20
Payer: MEDICARE

## 2024-12-20 ENCOUNTER — APPOINTMENT (OUTPATIENT)
Dept: INTERNAL MEDICINE | Facility: CLINIC | Age: 69
End: 2024-12-20
Payer: MEDICARE

## 2024-12-20 VITALS
OXYGEN SATURATION: 98 % | WEIGHT: 185 LBS | DIASTOLIC BLOOD PRESSURE: 72 MMHG | BODY MASS INDEX: 25.06 KG/M2 | SYSTOLIC BLOOD PRESSURE: 108 MMHG | HEART RATE: 57 BPM | HEIGHT: 72 IN

## 2024-12-20 VITALS — SYSTOLIC BLOOD PRESSURE: 105 MMHG | DIASTOLIC BLOOD PRESSURE: 72 MMHG

## 2024-12-20 DIAGNOSIS — R59.0 LOCALIZED ENLARGED LYMPH NODES: ICD-10-CM

## 2024-12-20 DIAGNOSIS — Q38.3 OTHER CONGENITAL MALFORMATIONS OF TONGUE: ICD-10-CM

## 2024-12-20 DIAGNOSIS — I34.0 NONRHEUMATIC MITRAL (VALVE) INSUFFICIENCY: ICD-10-CM

## 2024-12-20 DIAGNOSIS — I50.20 UNSPECIFIED SYSTOLIC (CONGESTIVE) HEART FAILURE: ICD-10-CM

## 2024-12-20 DIAGNOSIS — R29.818 OTHER SYMPTOMS AND SIGNS INVOLVING THE NERVOUS SYSTEM: ICD-10-CM

## 2024-12-20 DIAGNOSIS — R42 DIZZINESS AND GIDDINESS: ICD-10-CM

## 2024-12-20 DIAGNOSIS — I48.92 UNSPECIFIED ATRIAL FLUTTER: ICD-10-CM

## 2024-12-20 DIAGNOSIS — Z12.9 ENCOUNTER FOR SCREENING FOR MALIGNANT NEOPLASM, SITE UNSPECIFIED: ICD-10-CM

## 2024-12-20 DIAGNOSIS — R93.1 ABNORMAL FINDINGS ON DIAGNOSTIC IMAGING OF HEART AND CORONARY CIRCULATION: ICD-10-CM

## 2024-12-20 PROCEDURE — 99496 TRANSJ CARE MGMT HIGH F2F 7D: CPT

## 2024-12-20 PROCEDURE — 95810 POLYSOM 6/> YRS 4/> PARAM: CPT

## 2024-12-20 PROCEDURE — 93000 ELECTROCARDIOGRAM COMPLETE: CPT

## 2024-12-20 PROCEDURE — 95810 POLYSOM 6/> YRS 4/> PARAM: CPT | Mod: 26

## 2024-12-20 RX ORDER — FAMOTIDINE 20 MG/1
20 TABLET, FILM COATED ORAL
Qty: 90 | Refills: 1 | Status: ACTIVE | COMMUNITY
Start: 2024-12-20 | End: 1900-01-01

## 2024-12-21 ENCOUNTER — NON-APPOINTMENT (OUTPATIENT)
Age: 69
End: 2024-12-21

## 2024-12-22 PROBLEM — R59.0 CERVICAL LYMPHADENOPATHY: Status: ACTIVE | Noted: 2024-12-20

## 2024-12-23 LAB
ALBUMIN SERPL ELPH-MCNC: 3.9 G/DL
ALP BLD-CCNC: 96 U/L
ALT SERPL-CCNC: 16 U/L
ANION GAP SERPL CALC-SCNC: 14 MMOL/L
AST SERPL-CCNC: 18 U/L
BASOPHILS # BLD AUTO: 0.05 K/UL
BASOPHILS NFR BLD AUTO: 0.7 %
BILIRUB SERPL-MCNC: 0.3 MG/DL
BUN SERPL-MCNC: 15 MG/DL
CALCIUM SERPL-MCNC: 9.3 MG/DL
CHLORIDE SERPL-SCNC: 105 MMOL/L
CO2 SERPL-SCNC: 21 MMOL/L
CREAT SERPL-MCNC: 1.11 MG/DL
EGFR: 71 ML/MIN/1.73M2
EOSINOPHIL # BLD AUTO: 0.24 K/UL
EOSINOPHIL NFR BLD AUTO: 3.5 %
GLUCOSE SERPL-MCNC: 88 MG/DL
HCT VFR BLD CALC: 44.2 %
HGB BLD-MCNC: 14.4 G/DL
IMM GRANULOCYTES NFR BLD AUTO: 0.3 %
LYMPHOCYTES # BLD AUTO: 2.42 K/UL
LYMPHOCYTES NFR BLD AUTO: 35.6 %
MAN DIFF?: NORMAL
MCHC RBC-ENTMCNC: 26.9 PG
MCHC RBC-ENTMCNC: 32.6 G/DL
MCV RBC AUTO: 82.5 FL
MONOCYTES # BLD AUTO: 0.81 K/UL
MONOCYTES NFR BLD AUTO: 11.9 %
NEUTROPHILS # BLD AUTO: 3.25 K/UL
NEUTROPHILS NFR BLD AUTO: 48 %
NT-PROBNP SERPL-MCNC: 142 PG/ML
PLATELET # BLD AUTO: 243 K/UL
POTASSIUM SERPL-SCNC: 4.4 MMOL/L
PROT SERPL-MCNC: 7.1 G/DL
RBC # BLD: 5.36 M/UL
RBC # FLD: 14.1 %
SODIUM SERPL-SCNC: 140 MMOL/L
WBC # FLD AUTO: 6.79 K/UL

## 2024-12-24 DIAGNOSIS — G47.33 OBSTRUCTIVE SLEEP APNEA (ADULT) (PEDIATRIC): ICD-10-CM

## 2024-12-24 RX ORDER — DAPAGLIFLOZIN 10 MG/1
10 TABLET, FILM COATED ORAL
Qty: 90 | Refills: 1 | Status: ACTIVE | COMMUNITY
Start: 2024-12-20 | End: 1900-01-01

## 2024-12-27 ENCOUNTER — APPOINTMENT (OUTPATIENT)
Dept: ELECTROPHYSIOLOGY | Facility: CLINIC | Age: 69
End: 2024-12-27
Payer: MEDICARE

## 2024-12-27 ENCOUNTER — NON-APPOINTMENT (OUTPATIENT)
Age: 69
End: 2024-12-27

## 2024-12-27 PROCEDURE — 99441: CPT

## 2025-01-06 ENCOUNTER — NON-APPOINTMENT (OUTPATIENT)
Age: 70
End: 2025-01-06

## 2025-01-06 ENCOUNTER — APPOINTMENT (OUTPATIENT)
Dept: INTERNAL MEDICINE | Facility: CLINIC | Age: 70
End: 2025-01-06

## 2025-01-06 VITALS
HEART RATE: 65 BPM | DIASTOLIC BLOOD PRESSURE: 82 MMHG | TEMPERATURE: 98.2 F | HEIGHT: 72 IN | SYSTOLIC BLOOD PRESSURE: 112 MMHG | OXYGEN SATURATION: 95 % | BODY MASS INDEX: 26.28 KG/M2 | WEIGHT: 194 LBS

## 2025-01-06 VITALS — HEART RATE: 66 BPM | OXYGEN SATURATION: 97 %

## 2025-01-06 VITALS — SYSTOLIC BLOOD PRESSURE: 115 MMHG | DIASTOLIC BLOOD PRESSURE: 80 MMHG

## 2025-01-06 DIAGNOSIS — R63.5 ABNORMAL WEIGHT GAIN: ICD-10-CM

## 2025-01-06 DIAGNOSIS — I34.0 NONRHEUMATIC MITRAL (VALVE) INSUFFICIENCY: ICD-10-CM

## 2025-01-06 DIAGNOSIS — R93.1 ABNORMAL FINDINGS ON DIAGNOSTIC IMAGING OF HEART AND CORONARY CIRCULATION: ICD-10-CM

## 2025-01-06 DIAGNOSIS — R29.818 OTHER SYMPTOMS AND SIGNS INVOLVING THE NERVOUS SYSTEM: ICD-10-CM

## 2025-01-06 DIAGNOSIS — R59.0 LOCALIZED ENLARGED LYMPH NODES: ICD-10-CM

## 2025-01-06 PROCEDURE — 99214 OFFICE O/P EST MOD 30 MIN: CPT

## 2025-01-06 PROCEDURE — G2211 COMPLEX E/M VISIT ADD ON: CPT

## 2025-01-07 ENCOUNTER — APPOINTMENT (OUTPATIENT)
Dept: PULMONOLOGY | Facility: CLINIC | Age: 70
End: 2025-01-07
Payer: MEDICARE

## 2025-01-07 PROCEDURE — 99212 OFFICE O/P EST SF 10 MIN: CPT

## 2025-01-08 ENCOUNTER — APPOINTMENT (OUTPATIENT)
Dept: PULMONOLOGY | Facility: CLINIC | Age: 70
End: 2025-01-08

## 2025-01-09 ENCOUNTER — NON-APPOINTMENT (OUTPATIENT)
Age: 70
End: 2025-01-09

## 2025-01-09 ENCOUNTER — APPOINTMENT (OUTPATIENT)
Dept: ELECTROPHYSIOLOGY | Facility: CLINIC | Age: 70
End: 2025-01-09
Payer: MEDICARE

## 2025-01-09 VITALS
WEIGHT: 191 LBS | SYSTOLIC BLOOD PRESSURE: 105 MMHG | HEART RATE: 70 BPM | HEIGHT: 72 IN | BODY MASS INDEX: 25.87 KG/M2 | TEMPERATURE: 96.2 F | OXYGEN SATURATION: 97 % | DIASTOLIC BLOOD PRESSURE: 71 MMHG

## 2025-01-09 DIAGNOSIS — Z95.818 PRESENCE OF OTHER CARDIAC IMPLANTS AND GRAFTS: ICD-10-CM

## 2025-01-09 DIAGNOSIS — I50.20 UNSPECIFIED SYSTOLIC (CONGESTIVE) HEART FAILURE: ICD-10-CM

## 2025-01-09 DIAGNOSIS — Z86.79 OTHER SPECIFIED POSTPROCEDURAL STATES: ICD-10-CM

## 2025-01-09 DIAGNOSIS — I48.92 UNSPECIFIED ATRIAL FLUTTER: ICD-10-CM

## 2025-01-09 DIAGNOSIS — Z98.890 OTHER SPECIFIED POSTPROCEDURAL STATES: ICD-10-CM

## 2025-01-09 DIAGNOSIS — G47.33 OBSTRUCTIVE SLEEP APNEA (ADULT) (PEDIATRIC): ICD-10-CM

## 2025-01-09 DIAGNOSIS — R42 DIZZINESS AND GIDDINESS: ICD-10-CM

## 2025-01-09 PROCEDURE — 99213 OFFICE O/P EST LOW 20 MIN: CPT | Mod: 25

## 2025-01-09 PROCEDURE — 93000 ELECTROCARDIOGRAM COMPLETE: CPT

## 2025-01-14 ENCOUNTER — APPOINTMENT (OUTPATIENT)
Dept: ELECTROPHYSIOLOGY | Facility: CLINIC | Age: 70
End: 2025-01-14
Payer: MEDICARE

## 2025-01-14 ENCOUNTER — NON-APPOINTMENT (OUTPATIENT)
Age: 70
End: 2025-01-14

## 2025-01-14 PROCEDURE — 93298 REM INTERROG DEV EVAL SCRMS: CPT

## 2025-01-16 ENCOUNTER — APPOINTMENT (OUTPATIENT)
Dept: HEART FAILURE | Facility: CLINIC | Age: 70
End: 2025-01-16
Payer: MEDICARE

## 2025-01-16 ENCOUNTER — NON-APPOINTMENT (OUTPATIENT)
Age: 70
End: 2025-01-16

## 2025-01-16 VITALS
HEART RATE: 79 BPM | HEIGHT: 72 IN | OXYGEN SATURATION: 96 % | BODY MASS INDEX: 25.6 KG/M2 | SYSTOLIC BLOOD PRESSURE: 104 MMHG | DIASTOLIC BLOOD PRESSURE: 69 MMHG | WEIGHT: 189 LBS | TEMPERATURE: 97.7 F

## 2025-01-16 PROCEDURE — 36415 COLL VENOUS BLD VENIPUNCTURE: CPT

## 2025-01-16 PROCEDURE — 99214 OFFICE O/P EST MOD 30 MIN: CPT

## 2025-01-16 PROCEDURE — G2211 COMPLEX E/M VISIT ADD ON: CPT

## 2025-01-16 PROCEDURE — 93000 ELECTROCARDIOGRAM COMPLETE: CPT

## 2025-01-16 RX ORDER — FUROSEMIDE 20 MG/1
20 TABLET ORAL DAILY
Qty: 30 | Refills: 0 | Status: ACTIVE | COMMUNITY
Start: 2025-01-16

## 2025-01-17 ENCOUNTER — APPOINTMENT (OUTPATIENT)
Dept: ULTRASOUND IMAGING | Facility: CLINIC | Age: 70
End: 2025-01-17

## 2025-01-17 ENCOUNTER — OUTPATIENT (OUTPATIENT)
Dept: OUTPATIENT SERVICES | Facility: HOSPITAL | Age: 70
LOS: 1 days | End: 2025-01-17
Payer: MEDICARE

## 2025-01-17 DIAGNOSIS — Z00.8 ENCOUNTER FOR OTHER GENERAL EXAMINATION: ICD-10-CM

## 2025-01-17 PROCEDURE — 76536 US EXAM OF HEAD AND NECK: CPT | Mod: 26

## 2025-01-17 PROCEDURE — 76536 US EXAM OF HEAD AND NECK: CPT

## 2025-01-23 LAB
ANION GAP SERPL CALC-SCNC: 14 MMOL/L
BUN SERPL-MCNC: 21 MG/DL
CALCIUM SERPL-MCNC: 9.4 MG/DL
CHLORIDE SERPL-SCNC: 102 MMOL/L
CO2 SERPL-SCNC: 22 MMOL/L
CREAT SERPL-MCNC: 1.12 MG/DL
EGFR: 71 ML/MIN/1.73M2
GLUCOSE SERPL-MCNC: 96 MG/DL
NT-PROBNP SERPL-MCNC: 41 PG/ML
POTASSIUM SERPL-SCNC: 4.9 MMOL/L
SODIUM SERPL-SCNC: 139 MMOL/L

## 2025-01-30 ENCOUNTER — OUTPATIENT (OUTPATIENT)
Dept: OUTPATIENT SERVICES | Facility: HOSPITAL | Age: 70
LOS: 1 days | End: 2025-01-30
Payer: MEDICARE

## 2025-01-30 ENCOUNTER — APPOINTMENT (OUTPATIENT)
Dept: CT IMAGING | Facility: CLINIC | Age: 70
End: 2025-01-30
Payer: MEDICARE

## 2025-01-30 DIAGNOSIS — Z00.8 ENCOUNTER FOR OTHER GENERAL EXAMINATION: ICD-10-CM

## 2025-01-30 DIAGNOSIS — R59.0 LOCALIZED ENLARGED LYMPH NODES: ICD-10-CM

## 2025-01-30 PROCEDURE — 70491 CT SOFT TISSUE NECK W/DYE: CPT | Mod: 26

## 2025-01-30 PROCEDURE — 70491 CT SOFT TISSUE NECK W/DYE: CPT

## 2025-02-03 ENCOUNTER — OUTPATIENT (OUTPATIENT)
Dept: OUTPATIENT SERVICES | Facility: HOSPITAL | Age: 70
LOS: 1 days | Discharge: HOME | End: 2025-02-03

## 2025-02-03 ENCOUNTER — APPOINTMENT (OUTPATIENT)
Facility: HOSPITAL | Age: 70
End: 2025-02-03
Payer: MEDICARE

## 2025-02-03 DIAGNOSIS — G47.30 SLEEP APNEA, UNSPECIFIED: ICD-10-CM

## 2025-02-03 PROCEDURE — 95811 POLYSOM 6/>YRS CPAP 4/> PARM: CPT | Mod: 26

## 2025-02-04 DIAGNOSIS — K11.5 SIALOLITHIASIS: ICD-10-CM

## 2025-02-10 ENCOUNTER — OUTPATIENT (OUTPATIENT)
Dept: OUTPATIENT SERVICES | Facility: HOSPITAL | Age: 70
LOS: 1 days | End: 2025-02-10
Payer: MEDICARE

## 2025-02-10 ENCOUNTER — APPOINTMENT (OUTPATIENT)
Dept: CARDIOTHORACIC SURGERY | Facility: CLINIC | Age: 70
End: 2025-02-10
Payer: MEDICARE

## 2025-02-10 ENCOUNTER — RESULT REVIEW (OUTPATIENT)
Age: 70
End: 2025-02-10

## 2025-02-10 VITALS
SYSTOLIC BLOOD PRESSURE: 115 MMHG | HEART RATE: 62 BPM | OXYGEN SATURATION: 97 % | RESPIRATION RATE: 16 BRPM | DIASTOLIC BLOOD PRESSURE: 52 MMHG | TEMPERATURE: 98.3 F

## 2025-02-10 DIAGNOSIS — R06.00 DYSPNEA, UNSPECIFIED: ICD-10-CM

## 2025-02-10 DIAGNOSIS — R93.1 ABNORMAL FINDINGS ON DIAGNOSTIC IMAGING OF HEART AND CORONARY CIRCULATION: ICD-10-CM

## 2025-02-10 DIAGNOSIS — I50.20 UNSPECIFIED SYSTOLIC (CONGESTIVE) HEART FAILURE: ICD-10-CM

## 2025-02-10 DIAGNOSIS — I35.0 NONRHEUMATIC AORTIC (VALVE) STENOSIS: ICD-10-CM

## 2025-02-10 PROBLEM — I48.0 PAROXYSMAL ATRIAL FIBRILLATION: Status: ACTIVE | Noted: 2025-02-10

## 2025-02-10 PROCEDURE — 99204 OFFICE O/P NEW MOD 45 MIN: CPT

## 2025-02-10 PROCEDURE — 93356 MYOCRD STRAIN IMG SPCKL TRCK: CPT

## 2025-02-10 PROCEDURE — 93306 TTE W/DOPPLER COMPLETE: CPT | Mod: 26

## 2025-02-10 PROCEDURE — 93306 TTE W/DOPPLER COMPLETE: CPT

## 2025-02-18 ENCOUNTER — NON-APPOINTMENT (OUTPATIENT)
Age: 70
End: 2025-02-18

## 2025-02-18 ENCOUNTER — RX RENEWAL (OUTPATIENT)
Age: 70
End: 2025-02-18

## 2025-02-18 ENCOUNTER — APPOINTMENT (OUTPATIENT)
Dept: ELECTROPHYSIOLOGY | Facility: CLINIC | Age: 70
End: 2025-02-18
Payer: MEDICARE

## 2025-02-18 ENCOUNTER — APPOINTMENT (OUTPATIENT)
Dept: INTERNAL MEDICINE | Facility: CLINIC | Age: 70
End: 2025-02-18

## 2025-02-18 VITALS
HEART RATE: 94 BPM | TEMPERATURE: 98 F | HEIGHT: 72 IN | SYSTOLIC BLOOD PRESSURE: 110 MMHG | DIASTOLIC BLOOD PRESSURE: 90 MMHG | OXYGEN SATURATION: 97 % | BODY MASS INDEX: 26.14 KG/M2 | WEIGHT: 193 LBS

## 2025-02-18 DIAGNOSIS — R93.1 ABNORMAL FINDINGS ON DIAGNOSTIC IMAGING OF HEART AND CORONARY CIRCULATION: ICD-10-CM

## 2025-02-18 DIAGNOSIS — R29.818 OTHER SYMPTOMS AND SIGNS INVOLVING THE NERVOUS SYSTEM: ICD-10-CM

## 2025-02-18 DIAGNOSIS — K11.5 SIALOLITHIASIS: ICD-10-CM

## 2025-02-18 DIAGNOSIS — I34.0 NONRHEUMATIC MITRAL (VALVE) INSUFFICIENCY: ICD-10-CM

## 2025-02-18 DIAGNOSIS — Z12.9 ENCOUNTER FOR SCREENING FOR MALIGNANT NEOPLASM, SITE UNSPECIFIED: ICD-10-CM

## 2025-02-18 DIAGNOSIS — I48.92 UNSPECIFIED ATRIAL FLUTTER: ICD-10-CM

## 2025-02-18 DIAGNOSIS — R59.0 LOCALIZED ENLARGED LYMPH NODES: ICD-10-CM

## 2025-02-18 DIAGNOSIS — I48.0 PAROXYSMAL ATRIAL FIBRILLATION: ICD-10-CM

## 2025-02-18 DIAGNOSIS — G47.33 OBSTRUCTIVE SLEEP APNEA (ADULT) (PEDIATRIC): ICD-10-CM

## 2025-02-18 DIAGNOSIS — I50.20 UNSPECIFIED SYSTOLIC (CONGESTIVE) HEART FAILURE: ICD-10-CM

## 2025-02-18 PROCEDURE — G2211 COMPLEX E/M VISIT ADD ON: CPT

## 2025-02-18 PROCEDURE — 99214 OFFICE O/P EST MOD 30 MIN: CPT

## 2025-02-18 PROCEDURE — 93000 ELECTROCARDIOGRAM COMPLETE: CPT

## 2025-02-18 PROCEDURE — 93298 REM INTERROG DEV EVAL SCRMS: CPT

## 2025-03-05 ENCOUNTER — NON-APPOINTMENT (OUTPATIENT)
Age: 70
End: 2025-03-05

## 2025-03-05 ENCOUNTER — APPOINTMENT (OUTPATIENT)
Dept: OTOLARYNGOLOGY | Facility: CLINIC | Age: 70
End: 2025-03-05
Payer: MEDICARE

## 2025-03-05 VITALS
DIASTOLIC BLOOD PRESSURE: 73 MMHG | HEART RATE: 77 BPM | TEMPERATURE: 97.9 F | BODY MASS INDEX: 26.14 KG/M2 | HEIGHT: 72 IN | WEIGHT: 193 LBS | SYSTOLIC BLOOD PRESSURE: 108 MMHG

## 2025-03-05 DIAGNOSIS — R42 DIZZINESS AND GIDDINESS: ICD-10-CM

## 2025-03-05 DIAGNOSIS — K21.9 GASTRO-ESOPHAGEAL REFLUX DISEASE W/OUT ESOPHAGITIS: ICD-10-CM

## 2025-03-05 DIAGNOSIS — J35.1 HYPERTROPHY OF TONSILS: ICD-10-CM

## 2025-03-05 DIAGNOSIS — H91.93 UNSPECIFIED HEARING LOSS, BILATERAL: ICD-10-CM

## 2025-03-05 PROCEDURE — 92567 TYMPANOMETRY: CPT

## 2025-03-05 PROCEDURE — 31575 DIAGNOSTIC LARYNGOSCOPY: CPT

## 2025-03-05 PROCEDURE — 92557 COMPREHENSIVE HEARING TEST: CPT

## 2025-03-05 PROCEDURE — 99204 OFFICE O/P NEW MOD 45 MIN: CPT | Mod: 25

## 2025-03-11 ENCOUNTER — NON-APPOINTMENT (OUTPATIENT)
Age: 70
End: 2025-03-11

## 2025-03-11 ENCOUNTER — APPOINTMENT (OUTPATIENT)
Dept: INTERNAL MEDICINE | Facility: CLINIC | Age: 70
End: 2025-03-11
Payer: MEDICARE

## 2025-03-11 VITALS
OXYGEN SATURATION: 97 % | WEIGHT: 185 LBS | SYSTOLIC BLOOD PRESSURE: 108 MMHG | BODY MASS INDEX: 25.06 KG/M2 | HEART RATE: 62 BPM | DIASTOLIC BLOOD PRESSURE: 74 MMHG | TEMPERATURE: 97.6 F | HEIGHT: 72 IN

## 2025-03-11 DIAGNOSIS — R93.1 ABNORMAL FINDINGS ON DIAGNOSTIC IMAGING OF HEART AND CORONARY CIRCULATION: ICD-10-CM

## 2025-03-11 DIAGNOSIS — R13.12 DYSPHAGIA, OROPHARYNGEAL PHASE: ICD-10-CM

## 2025-03-11 DIAGNOSIS — R22.31 LOCALIZED SWELLING, MASS AND LUMP, RIGHT UPPER LIMB: ICD-10-CM

## 2025-03-11 DIAGNOSIS — R59.0 LOCALIZED ENLARGED LYMPH NODES: ICD-10-CM

## 2025-03-11 DIAGNOSIS — K11.5 SIALOLITHIASIS: ICD-10-CM

## 2025-03-11 PROBLEM — H90.3 ASYMMETRIC SNHL (SENSORINEURAL HEARING LOSS): Status: ACTIVE | Noted: 2025-03-05

## 2025-03-11 PROCEDURE — 93000 ELECTROCARDIOGRAM COMPLETE: CPT

## 2025-03-11 PROCEDURE — 99214 OFFICE O/P EST MOD 30 MIN: CPT

## 2025-03-11 PROCEDURE — G2211 COMPLEX E/M VISIT ADD ON: CPT

## 2025-03-12 PROBLEM — E11.9 DIABETES: Status: ACTIVE | Noted: 2025-03-12

## 2025-03-12 LAB
ALBUMIN SERPL ELPH-MCNC: 4.2 G/DL
ALP BLD-CCNC: 97 U/L
ALT SERPL-CCNC: 12 U/L
ANION GAP SERPL CALC-SCNC: 12 MMOL/L
AST SERPL-CCNC: 17 U/L
BASOPHILS # BLD AUTO: 0.03 K/UL
BASOPHILS NFR BLD AUTO: 0.4 %
BILIRUB SERPL-MCNC: 0.4 MG/DL
BUN SERPL-MCNC: 39 MG/DL
CALCIUM SERPL-MCNC: 9.7 MG/DL
CHLORIDE SERPL-SCNC: 105 MMOL/L
CHOLEST SERPL-MCNC: 178 MG/DL
CK SERPL-CCNC: 135 U/L
CO2 SERPL-SCNC: 22 MMOL/L
CREAT SERPL-MCNC: 1.44 MG/DL
EGFRCR SERPLBLD CKD-EPI 2021: 52 ML/MIN/1.73M2
EOSINOPHIL # BLD AUTO: 0.07 K/UL
EOSINOPHIL NFR BLD AUTO: 1 %
ESTIMATED AVERAGE GLUCOSE: 151 MG/DL
GLUCOSE SERPL-MCNC: 90 MG/DL
HBA1C MFR BLD HPLC: 6.9 %
HCT VFR BLD CALC: 39.1 %
HDLC SERPL-MCNC: 32 MG/DL
HGB BLD-MCNC: 12.6 G/DL
IMM GRANULOCYTES NFR BLD AUTO: 0.3 %
LDLC SERPL CALC-MCNC: 128 MG/DL
LYMPHOCYTES # BLD AUTO: 1.87 K/UL
LYMPHOCYTES NFR BLD AUTO: 26.8 %
MAN DIFF?: NORMAL
MCHC RBC-ENTMCNC: 27.3 PG
MCHC RBC-ENTMCNC: 32.2 G/DL
MCV RBC AUTO: 84.6 FL
MONOCYTES # BLD AUTO: 0.56 K/UL
MONOCYTES NFR BLD AUTO: 8 %
NEUTROPHILS # BLD AUTO: 4.43 K/UL
NEUTROPHILS NFR BLD AUTO: 63.5 %
NONHDLC SERPL-MCNC: 146 MG/DL
NT-PROBNP SERPL-MCNC: 67 PG/ML
PLATELET # BLD AUTO: 252 K/UL
POTASSIUM SERPL-SCNC: 5.1 MMOL/L
PROT SERPL-MCNC: 7.9 G/DL
RBC # BLD: 4.62 M/UL
RBC # FLD: 17.6 %
SODIUM SERPL-SCNC: 138 MMOL/L
T4 FREE SERPL-MCNC: 1.2 NG/DL
TRIGL SERPL-MCNC: 98 MG/DL
TSH SERPL-ACNC: 0.4 UIU/ML
WBC # FLD AUTO: 6.98 K/UL

## 2025-03-14 ENCOUNTER — APPOINTMENT (OUTPATIENT)
Dept: OTOLARYNGOLOGY | Facility: CLINIC | Age: 70
End: 2025-03-14

## 2025-03-14 PROCEDURE — ZZZZZ: CPT

## 2025-03-14 PROCEDURE — 92540 BASIC VESTIBULAR EVALUATION: CPT

## 2025-03-14 PROCEDURE — 92567 TYMPANOMETRY: CPT

## 2025-03-14 PROCEDURE — 92547 SUPPLEMENTAL ELECTRICAL TEST: CPT

## 2025-03-14 PROCEDURE — 92537 CALORIC VSTBLR TEST W/REC: CPT

## 2025-03-17 ENCOUNTER — OUTPATIENT (OUTPATIENT)
Dept: OUTPATIENT SERVICES | Facility: HOSPITAL | Age: 70
LOS: 1 days | End: 2025-03-17
Payer: MEDICARE

## 2025-03-17 ENCOUNTER — APPOINTMENT (OUTPATIENT)
Dept: ULTRASOUND IMAGING | Facility: IMAGING CENTER | Age: 70
End: 2025-03-17
Payer: MEDICARE

## 2025-03-17 ENCOUNTER — APPOINTMENT (OUTPATIENT)
Dept: CT IMAGING | Facility: IMAGING CENTER | Age: 70
End: 2025-03-17
Payer: MEDICARE

## 2025-03-17 ENCOUNTER — APPOINTMENT (OUTPATIENT)
Dept: MRI IMAGING | Facility: IMAGING CENTER | Age: 70
End: 2025-03-17
Payer: MEDICARE

## 2025-03-17 DIAGNOSIS — Z00.8 ENCOUNTER FOR OTHER GENERAL EXAMINATION: ICD-10-CM

## 2025-03-17 DIAGNOSIS — R22.31 LOCALIZED SWELLING, MASS AND LUMP, RIGHT UPPER LIMB: ICD-10-CM

## 2025-03-17 DIAGNOSIS — K21.9 GASTRO-ESOPHAGEAL REFLUX DISEASE WITHOUT ESOPHAGITIS: ICD-10-CM

## 2025-03-17 PROCEDURE — 70553 MRI BRAIN STEM W/O & W/DYE: CPT | Mod: 26

## 2025-03-17 PROCEDURE — 70491 CT SOFT TISSUE NECK W/DYE: CPT | Mod: 26

## 2025-03-17 PROCEDURE — 70491 CT SOFT TISSUE NECK W/DYE: CPT

## 2025-03-17 PROCEDURE — 76882 US LMTD JT/FCL EVL NVASC XTR: CPT

## 2025-03-17 PROCEDURE — 70553 MRI BRAIN STEM W/O & W/DYE: CPT

## 2025-03-17 PROCEDURE — 76882 US LMTD JT/FCL EVL NVASC XTR: CPT | Mod: 26,RT

## 2025-03-17 PROCEDURE — A9585: CPT

## 2025-03-18 ENCOUNTER — APPOINTMENT (OUTPATIENT)
Dept: CV DIAGNOSITCS | Facility: HOSPITAL | Age: 70
End: 2025-03-18

## 2025-03-19 ENCOUNTER — APPOINTMENT (OUTPATIENT)
Dept: RADIOLOGY | Facility: HOSPITAL | Age: 70
End: 2025-03-19

## 2025-03-19 ENCOUNTER — OUTPATIENT (OUTPATIENT)
Dept: OUTPATIENT SERVICES | Facility: HOSPITAL | Age: 70
LOS: 1 days | End: 2025-03-19
Payer: MEDICARE

## 2025-03-19 ENCOUNTER — APPOINTMENT (OUTPATIENT)
Dept: SPEECH THERAPY | Facility: HOSPITAL | Age: 70
End: 2025-03-19

## 2025-03-19 DIAGNOSIS — R13.12 DYSPHAGIA, OROPHARYNGEAL PHASE: ICD-10-CM

## 2025-03-19 PROCEDURE — 74230 X-RAY XM SWLNG FUNCJ C+: CPT | Mod: 26

## 2025-03-20 ENCOUNTER — APPOINTMENT (OUTPATIENT)
Dept: OTOLARYNGOLOGY | Facility: CLINIC | Age: 70
End: 2025-03-20

## 2025-03-21 ENCOUNTER — APPOINTMENT (OUTPATIENT)
Dept: PULMONOLOGY | Facility: CLINIC | Age: 70
End: 2025-03-21
Payer: MEDICARE

## 2025-03-21 VITALS
OXYGEN SATURATION: 98 % | WEIGHT: 188 LBS | TEMPERATURE: 97.5 F | RESPIRATION RATE: 18 BRPM | BODY MASS INDEX: 25.47 KG/M2 | HEIGHT: 72 IN | DIASTOLIC BLOOD PRESSURE: 72 MMHG | SYSTOLIC BLOOD PRESSURE: 110 MMHG | HEART RATE: 92 BPM

## 2025-03-21 DIAGNOSIS — Z82.5 FAMILY HISTORY OF ASTHMA AND OTHER CHRONIC LOWER RESPIRATORY DISEASES: ICD-10-CM

## 2025-03-21 DIAGNOSIS — G47.33 OBSTRUCTIVE SLEEP APNEA (ADULT) (PEDIATRIC): ICD-10-CM

## 2025-03-21 DIAGNOSIS — Z82.49 FAMILY HISTORY OF ISCHEMIC HEART DISEASE AND OTHER DISEASES OF THE CIRCULATORY SYSTEM: ICD-10-CM

## 2025-03-21 DIAGNOSIS — R29.818 OTHER SYMPTOMS AND SIGNS INVOLVING THE NERVOUS SYSTEM: ICD-10-CM

## 2025-03-21 DIAGNOSIS — I50.20 UNSPECIFIED SYSTOLIC (CONGESTIVE) HEART FAILURE: ICD-10-CM

## 2025-03-21 DIAGNOSIS — R07.89 OTHER CHEST PAIN: ICD-10-CM

## 2025-03-21 DIAGNOSIS — I48.0 PAROXYSMAL ATRIAL FIBRILLATION: ICD-10-CM

## 2025-03-21 DIAGNOSIS — I48.92 UNSPECIFIED ATRIAL FLUTTER: ICD-10-CM

## 2025-03-21 DIAGNOSIS — E55.9 VITAMIN D DEFICIENCY, UNSPECIFIED: ICD-10-CM

## 2025-03-21 DIAGNOSIS — K21.9 GASTRO-ESOPHAGEAL REFLUX DISEASE W/OUT ESOPHAGITIS: ICD-10-CM

## 2025-03-21 DIAGNOSIS — Z83.438 FAMILY HISTORY OF OTHER DISORDER OF LIPOPROTEIN METABOLISM AND OTHER LIPIDEMIA: ICD-10-CM

## 2025-03-21 DIAGNOSIS — H90.3 SENSORINEURAL HEARING LOSS, BILATERAL: ICD-10-CM

## 2025-03-21 DIAGNOSIS — E11.9 TYPE 2 DIABETES MELLITUS W/OUT COMPLICATIONS: ICD-10-CM

## 2025-03-21 DIAGNOSIS — Z81.2 FAMILY HISTORY OF TOBACCO ABUSE AND DEPENDENCE: ICD-10-CM

## 2025-03-21 DIAGNOSIS — R06.02 SHORTNESS OF BREATH: ICD-10-CM

## 2025-03-21 DIAGNOSIS — D17.21 BENIGN LIPOMATOUS NEOPLASM OF SKIN AND SUBCUTANEOUS TISSUE OF RIGHT ARM: ICD-10-CM

## 2025-03-21 DIAGNOSIS — I34.0 NONRHEUMATIC MITRAL (VALVE) INSUFFICIENCY: ICD-10-CM

## 2025-03-21 DIAGNOSIS — Z87.898 PERSONAL HISTORY OF OTHER SPECIFIED CONDITIONS: ICD-10-CM

## 2025-03-21 LAB
ANION GAP SERPL CALC-SCNC: 12 MMOL/L
BASOPHILS # BLD AUTO: 0.04 K/UL
BASOPHILS NFR BLD AUTO: 0.6 %
BUN SERPL-MCNC: 22 MG/DL
CALCIUM SERPL-MCNC: 9.3 MG/DL
CHLORIDE SERPL-SCNC: 106 MMOL/L
CO2 SERPL-SCNC: 22 MMOL/L
CREAT SERPL-MCNC: 1.31 MG/DL
EGFRCR SERPLBLD CKD-EPI 2021: 59 ML/MIN/1.73M2
EOSINOPHIL # BLD AUTO: 0.17 K/UL
EOSINOPHIL NFR BLD AUTO: 2.6 %
FERRITIN SERPL-MCNC: 240 NG/ML
GLUCOSE SERPL-MCNC: 101 MG/DL
HCT VFR BLD CALC: 37 %
HGB BLD-MCNC: 12.4 G/DL
IMM GRANULOCYTES NFR BLD AUTO: 0.2 %
IRON SATN MFR SERPL: 25 %
IRON SERPL-MCNC: 83 UG/DL
LYMPHOCYTES # BLD AUTO: 1.89 K/UL
LYMPHOCYTES NFR BLD AUTO: 28.6 %
MAN DIFF?: NORMAL
MCHC RBC-ENTMCNC: 27.7 PG
MCHC RBC-ENTMCNC: 33.5 G/DL
MCV RBC AUTO: 82.8 FL
MONOCYTES # BLD AUTO: 0.58 K/UL
MONOCYTES NFR BLD AUTO: 8.8 %
NEUTROPHILS # BLD AUTO: 3.91 K/UL
NEUTROPHILS NFR BLD AUTO: 59.2 %
PLATELET # BLD AUTO: 273 K/UL
POTASSIUM SERPL-SCNC: 4.5 MMOL/L
RBC # BLD: 4.47 M/UL
RBC # FLD: 17 %
SODIUM SERPL-SCNC: 141 MMOL/L
TIBC SERPL-MCNC: 332 UG/DL
UIBC SERPL-MCNC: 249 UG/DL
WBC # FLD AUTO: 6.6 K/UL

## 2025-03-21 PROCEDURE — 99214 OFFICE O/P EST MOD 30 MIN: CPT | Mod: 25

## 2025-03-21 PROCEDURE — 95012 NITRIC OXIDE EXP GAS DETER: CPT

## 2025-03-21 PROCEDURE — 94729 DIFFUSING CAPACITY: CPT

## 2025-03-21 PROCEDURE — 94727 GAS DIL/WSHOT DETER LNG VOL: CPT

## 2025-03-21 PROCEDURE — 94618 PULMONARY STRESS TESTING: CPT

## 2025-03-21 PROCEDURE — 71046 X-RAY EXAM CHEST 2 VIEWS: CPT

## 2025-03-21 PROCEDURE — ZZZZZ: CPT

## 2025-03-21 PROCEDURE — 94060 EVALUATION OF WHEEZING: CPT

## 2025-03-24 ENCOUNTER — APPOINTMENT (OUTPATIENT)
Dept: OTOLARYNGOLOGY | Facility: CLINIC | Age: 70
End: 2025-03-24
Payer: MEDICARE

## 2025-03-24 DIAGNOSIS — D64.9 ANEMIA, UNSPECIFIED: ICD-10-CM

## 2025-03-24 PROCEDURE — 92526 ORAL FUNCTION THERAPY: CPT | Mod: GN

## 2025-03-25 ENCOUNTER — NON-APPOINTMENT (OUTPATIENT)
Age: 70
End: 2025-03-25

## 2025-03-25 ENCOUNTER — APPOINTMENT (OUTPATIENT)
Dept: ELECTROPHYSIOLOGY | Facility: CLINIC | Age: 70
End: 2025-03-25
Payer: MEDICARE

## 2025-03-25 PROCEDURE — 93298 REM INTERROG DEV EVAL SCRMS: CPT

## 2025-04-02 ENCOUNTER — APPOINTMENT (OUTPATIENT)
Dept: OTOLARYNGOLOGY | Facility: CLINIC | Age: 70
End: 2025-04-02
Payer: MEDICARE

## 2025-04-02 PROCEDURE — 92526 ORAL FUNCTION THERAPY: CPT | Mod: GN

## 2025-04-03 ENCOUNTER — APPOINTMENT (OUTPATIENT)
Dept: CARDIOLOGY | Facility: CLINIC | Age: 70
End: 2025-04-03
Payer: MEDICARE

## 2025-04-03 ENCOUNTER — NON-APPOINTMENT (OUTPATIENT)
Age: 70
End: 2025-04-03

## 2025-04-03 VITALS
TEMPERATURE: 97.8 F | WEIGHT: 186 LBS | SYSTOLIC BLOOD PRESSURE: 94 MMHG | HEIGHT: 72 IN | HEART RATE: 48 BPM | DIASTOLIC BLOOD PRESSURE: 60 MMHG | RESPIRATION RATE: 17 BRPM | OXYGEN SATURATION: 92 % | BODY MASS INDEX: 25.19 KG/M2

## 2025-04-03 DIAGNOSIS — I50.20 UNSPECIFIED SYSTOLIC (CONGESTIVE) HEART FAILURE: ICD-10-CM

## 2025-04-03 DIAGNOSIS — I48.92 UNSPECIFIED ATRIAL FLUTTER: ICD-10-CM

## 2025-04-03 DIAGNOSIS — Z86.79 OTHER SPECIFIED POSTPROCEDURAL STATES: ICD-10-CM

## 2025-04-03 DIAGNOSIS — R06.02 SHORTNESS OF BREATH: ICD-10-CM

## 2025-04-03 DIAGNOSIS — Z98.890 OTHER SPECIFIED POSTPROCEDURAL STATES: ICD-10-CM

## 2025-04-03 PROCEDURE — 99215 OFFICE O/P EST HI 40 MIN: CPT

## 2025-04-03 PROCEDURE — 93000 ELECTROCARDIOGRAM COMPLETE: CPT

## 2025-04-03 PROCEDURE — G2211 COMPLEX E/M VISIT ADD ON: CPT

## 2025-04-09 ENCOUNTER — APPOINTMENT (OUTPATIENT)
Dept: OTOLARYNGOLOGY | Facility: CLINIC | Age: 70
End: 2025-04-09
Payer: MEDICARE

## 2025-04-09 PROCEDURE — 92526 ORAL FUNCTION THERAPY: CPT | Mod: GN

## 2025-04-10 ENCOUNTER — APPOINTMENT (OUTPATIENT)
Dept: OTOLARYNGOLOGY | Facility: CLINIC | Age: 70
End: 2025-04-10

## 2025-04-14 ENCOUNTER — APPOINTMENT (OUTPATIENT)
Dept: SLEEP CENTER | Facility: CLINIC | Age: 70
End: 2025-04-14

## 2025-04-18 ENCOUNTER — APPOINTMENT (OUTPATIENT)
Dept: GASTROENTEROLOGY | Facility: CLINIC | Age: 70
End: 2025-04-18
Payer: MEDICARE

## 2025-04-18 VITALS
WEIGHT: 178 LBS | BODY MASS INDEX: 24.11 KG/M2 | HEART RATE: 60 BPM | DIASTOLIC BLOOD PRESSURE: 60 MMHG | TEMPERATURE: 98 F | SYSTOLIC BLOOD PRESSURE: 120 MMHG | HEIGHT: 72 IN | RESPIRATION RATE: 14 BRPM

## 2025-04-18 DIAGNOSIS — H90.3 SENSORINEURAL HEARING LOSS, BILATERAL: ICD-10-CM

## 2025-04-18 DIAGNOSIS — R93.1 ABNORMAL FINDINGS ON DIAGNOSTIC IMAGING OF HEART AND CORONARY CIRCULATION: ICD-10-CM

## 2025-04-18 DIAGNOSIS — Z12.11 ENCOUNTER FOR SCREENING FOR MALIGNANT NEOPLASM OF COLON: ICD-10-CM

## 2025-04-18 PROCEDURE — 99204 OFFICE O/P NEW MOD 45 MIN: CPT

## 2025-04-21 ENCOUNTER — TRANSCRIPTION ENCOUNTER (OUTPATIENT)
Age: 70
End: 2025-04-21

## 2025-04-22 ENCOUNTER — NON-APPOINTMENT (OUTPATIENT)
Age: 70
End: 2025-04-22

## 2025-04-23 ENCOUNTER — APPOINTMENT (OUTPATIENT)
Dept: OTOLARYNGOLOGY | Facility: CLINIC | Age: 70
End: 2025-04-23

## 2025-04-29 ENCOUNTER — APPOINTMENT (OUTPATIENT)
Dept: ELECTROPHYSIOLOGY | Facility: CLINIC | Age: 70
End: 2025-04-29
Payer: MEDICARE

## 2025-04-29 ENCOUNTER — NON-APPOINTMENT (OUTPATIENT)
Age: 70
End: 2025-04-29

## 2025-04-29 PROCEDURE — 93298 REM INTERROG DEV EVAL SCRMS: CPT

## 2025-05-08 ENCOUNTER — APPOINTMENT (OUTPATIENT)
Dept: ENDOCRINOLOGY | Facility: CLINIC | Age: 70
End: 2025-05-08
Payer: MEDICARE

## 2025-05-08 VITALS
HEART RATE: 68 BPM | DIASTOLIC BLOOD PRESSURE: 78 MMHG | WEIGHT: 170.13 LBS | BODY MASS INDEX: 23.04 KG/M2 | SYSTOLIC BLOOD PRESSURE: 116 MMHG | OXYGEN SATURATION: 98 % | HEIGHT: 72 IN

## 2025-05-08 DIAGNOSIS — E11.9 TYPE 2 DIABETES MELLITUS W/OUT COMPLICATIONS: ICD-10-CM

## 2025-05-08 PROCEDURE — 99204 OFFICE O/P NEW MOD 45 MIN: CPT

## 2025-05-08 RX ORDER — LANCETS 33 GAUGE
EACH MISCELLANEOUS
Qty: 1 | Refills: 1 | Status: ACTIVE | COMMUNITY
Start: 2025-05-08 | End: 1900-01-01

## 2025-05-08 RX ORDER — BLOOD-GLUCOSE METER
W/DEVICE KIT MISCELLANEOUS
Qty: 1 | Refills: 0 | Status: ACTIVE | COMMUNITY
Start: 2025-05-08 | End: 1900-01-01

## 2025-05-08 RX ORDER — BLOOD-GLUCOSE METER
70 EACH MISCELLANEOUS
Qty: 3 | Refills: 3 | Status: ACTIVE | COMMUNITY
Start: 2025-05-08 | End: 1900-01-01

## 2025-05-08 RX ORDER — BLOOD SUGAR DIAGNOSTIC
STRIP MISCELLANEOUS DAILY
Qty: 90 | Refills: 2 | Status: ACTIVE | COMMUNITY
Start: 2025-05-08 | End: 1900-01-01

## 2025-05-08 RX ORDER — GLUCOSAM/CHON-MSM1/C/MANG/BOSW 500-416.6
TABLET ORAL
Qty: 1 | Refills: 3 | Status: ACTIVE | COMMUNITY
Start: 2025-05-08 | End: 1900-01-01

## 2025-05-08 RX ORDER — EMPAGLIFLOZIN 10 MG/1
10 TABLET, FILM COATED ORAL DAILY
Qty: 90 | Refills: 2 | Status: ACTIVE | COMMUNITY
Start: 2025-05-08 | End: 1900-01-01

## 2025-06-03 ENCOUNTER — APPOINTMENT (OUTPATIENT)
Dept: ELECTROPHYSIOLOGY | Facility: CLINIC | Age: 70
End: 2025-06-03
Payer: MEDICARE

## 2025-06-03 ENCOUNTER — NON-APPOINTMENT (OUTPATIENT)
Age: 70
End: 2025-06-03

## 2025-06-03 PROCEDURE — 93298 REM INTERROG DEV EVAL SCRMS: CPT

## 2025-06-05 ENCOUNTER — APPOINTMENT (OUTPATIENT)
Dept: INTERNAL MEDICINE | Facility: CLINIC | Age: 70
End: 2025-06-05

## 2025-06-05 VITALS
SYSTOLIC BLOOD PRESSURE: 104 MMHG | OXYGEN SATURATION: 97 % | WEIGHT: 169 LBS | TEMPERATURE: 97.9 F | BODY MASS INDEX: 22.89 KG/M2 | DIASTOLIC BLOOD PRESSURE: 60 MMHG | HEIGHT: 72 IN | RESPIRATION RATE: 15 BRPM | HEART RATE: 57 BPM

## 2025-06-05 DIAGNOSIS — I34.0 NONRHEUMATIC MITRAL (VALVE) INSUFFICIENCY: ICD-10-CM

## 2025-06-05 DIAGNOSIS — G47.33 OBSTRUCTIVE SLEEP APNEA (ADULT) (PEDIATRIC): ICD-10-CM

## 2025-06-05 DIAGNOSIS — K11.5 SIALOLITHIASIS: ICD-10-CM

## 2025-06-05 PROBLEM — R68.89 FORGETFULNESS: Status: ACTIVE | Noted: 2025-06-05

## 2025-06-05 PROCEDURE — 99214 OFFICE O/P EST MOD 30 MIN: CPT

## 2025-06-05 PROCEDURE — G2211 COMPLEX E/M VISIT ADD ON: CPT

## 2025-06-09 LAB
ALBUMIN SERPL ELPH-MCNC: 4.1 G/DL
ALP BLD-CCNC: 75 U/L
ALT SERPL-CCNC: 30 U/L
ANION GAP SERPL CALC-SCNC: 18 MMOL/L
AST SERPL-CCNC: 21 U/L
BASOPHILS # BLD AUTO: 0.04 K/UL
BASOPHILS NFR BLD AUTO: 0.8 %
BILIRUB SERPL-MCNC: 0.2 MG/DL
BUN SERPL-MCNC: 39 MG/DL
CALCIUM SERPL-MCNC: 9.4 MG/DL
CHLORIDE SERPL-SCNC: 105 MMOL/L
CHOLEST SERPL-MCNC: 212 MG/DL
CK SERPL-CCNC: 170 U/L
CO2 SERPL-SCNC: 17 MMOL/L
CREAT SERPL-MCNC: 1.52 MG/DL
EGFRCR SERPLBLD CKD-EPI 2021: 49 ML/MIN/1.73M2
EOSINOPHIL # BLD AUTO: 0.05 K/UL
EOSINOPHIL NFR BLD AUTO: 1.1 %
ESTIMATED AVERAGE GLUCOSE: 117 MG/DL
GLUCOSE SERPL-MCNC: 98 MG/DL
HBA1C MFR BLD HPLC: 5.7 %
HCT VFR BLD CALC: 37.9 %
HDLC SERPL-MCNC: 54 MG/DL
HGB BLD-MCNC: 12 G/DL
IMM GRANULOCYTES NFR BLD AUTO: 0.2 %
LDLC SERPL-MCNC: 142 MG/DL
LYMPHOCYTES # BLD AUTO: 1.58 K/UL
LYMPHOCYTES NFR BLD AUTO: 33.3 %
MAN DIFF?: NORMAL
MCHC RBC-ENTMCNC: 28.8 PG
MCHC RBC-ENTMCNC: 31.7 G/DL
MCV RBC AUTO: 91.1 FL
MONOCYTES # BLD AUTO: 0.46 K/UL
MONOCYTES NFR BLD AUTO: 9.7 %
NEUTROPHILS # BLD AUTO: 2.61 K/UL
NEUTROPHILS NFR BLD AUTO: 54.9 %
NONHDLC SERPL-MCNC: 158 MG/DL
NT-PROBNP SERPL-MCNC: 91 PG/ML
PLATELET # BLD AUTO: 234 K/UL
POTASSIUM SERPL-SCNC: 4.7 MMOL/L
PROT SERPL-MCNC: 6.5 G/DL
RBC # BLD: 4.16 M/UL
RBC # FLD: 14.1 %
SODIUM SERPL-SCNC: 140 MMOL/L
T4 FREE SERPL-MCNC: 1.2 NG/DL
TRIGL SERPL-MCNC: 91 MG/DL
TSH SERPL-ACNC: 0.33 UIU/ML
WBC # FLD AUTO: 4.75 K/UL

## 2025-06-12 ENCOUNTER — APPOINTMENT (OUTPATIENT)
Dept: INTERNAL MEDICINE | Facility: CLINIC | Age: 70
End: 2025-06-12
Payer: MEDICARE

## 2025-06-12 VITALS
SYSTOLIC BLOOD PRESSURE: 90 MMHG | TEMPERATURE: 97.9 F | OXYGEN SATURATION: 97 % | BODY MASS INDEX: 22.65 KG/M2 | WEIGHT: 167 LBS | RESPIRATION RATE: 16 BRPM | HEART RATE: 54 BPM | DIASTOLIC BLOOD PRESSURE: 62 MMHG

## 2025-06-12 VITALS — DIASTOLIC BLOOD PRESSURE: 60 MMHG | SYSTOLIC BLOOD PRESSURE: 90 MMHG

## 2025-06-12 PROCEDURE — G2211 COMPLEX E/M VISIT ADD ON: CPT

## 2025-06-12 PROCEDURE — 99214 OFFICE O/P EST MOD 30 MIN: CPT

## 2025-06-12 PROCEDURE — 93000 ELECTROCARDIOGRAM COMPLETE: CPT

## 2025-06-13 ENCOUNTER — RESULT REVIEW (OUTPATIENT)
Age: 70
End: 2025-06-13

## 2025-06-13 LAB
ALBUMIN SERPL ELPH-MCNC: 4.1 G/DL
ALP BLD-CCNC: 79 U/L
ALT SERPL-CCNC: 37 U/L
ANION GAP SERPL CALC-SCNC: 13 MMOL/L
AST SERPL-CCNC: 38 U/L
BASOPHILS # BLD AUTO: 0.02 K/UL
BASOPHILS NFR BLD AUTO: 0.3 %
BILIRUB SERPL-MCNC: 0.4 MG/DL
BUN SERPL-MCNC: 26 MG/DL
CALCIUM SERPL-MCNC: 9.7 MG/DL
CHLORIDE SERPL-SCNC: 107 MMOL/L
CO2 SERPL-SCNC: 22 MMOL/L
CREAT SERPL-MCNC: 1.3 MG/DL
EGFRCR SERPLBLD CKD-EPI 2021: 59 ML/MIN/1.73M2
EOSINOPHIL # BLD AUTO: 0.05 K/UL
EOSINOPHIL NFR BLD AUTO: 0.8 %
GLUCOSE SERPL-MCNC: 89 MG/DL
HCT VFR BLD CALC: 40.1 %
HGB BLD-MCNC: 12.4 G/DL
IMM GRANULOCYTES NFR BLD AUTO: 0.2 %
LYMPHOCYTES # BLD AUTO: 1.68 K/UL
LYMPHOCYTES NFR BLD AUTO: 27.6 %
MAN DIFF?: NORMAL
MCHC RBC-ENTMCNC: 28.4 PG
MCHC RBC-ENTMCNC: 30.9 G/DL
MCV RBC AUTO: 91.8 FL
MONOCYTES # BLD AUTO: 0.5 K/UL
MONOCYTES NFR BLD AUTO: 8.2 %
NEUTROPHILS # BLD AUTO: 3.82 K/UL
NEUTROPHILS NFR BLD AUTO: 62.9 %
NT-PROBNP SERPL-MCNC: 118 PG/ML
PLATELET # BLD AUTO: 248 K/UL
POTASSIUM SERPL-SCNC: 5 MMOL/L
PROT SERPL-MCNC: 6.8 G/DL
PSA FREE FLD-MCNC: 29 %
PSA FREE SERPL-MCNC: 0.17 NG/ML
PSA SERPL-MCNC: 0.59 NG/ML
RBC # BLD: 4.37 M/UL
RBC # FLD: 14.1 %
SODIUM SERPL-SCNC: 143 MMOL/L
WBC # FLD AUTO: 6.08 K/UL

## 2025-06-17 ENCOUNTER — APPOINTMENT (OUTPATIENT)
Dept: INTERNAL MEDICINE | Facility: CLINIC | Age: 70
End: 2025-06-17

## 2025-06-17 ENCOUNTER — APPOINTMENT (OUTPATIENT)
Dept: CARDIOLOGY | Facility: CLINIC | Age: 70
End: 2025-06-17
Payer: MEDICARE

## 2025-06-17 VITALS
SYSTOLIC BLOOD PRESSURE: 118 MMHG | OXYGEN SATURATION: 98 % | DIASTOLIC BLOOD PRESSURE: 74 MMHG | TEMPERATURE: 97.7 F | HEART RATE: 54 BPM | RESPIRATION RATE: 16 BRPM

## 2025-06-17 VITALS
TEMPERATURE: 97.3 F | HEART RATE: 62 BPM | HEIGHT: 72 IN | RESPIRATION RATE: 17 BRPM | SYSTOLIC BLOOD PRESSURE: 104 MMHG | OXYGEN SATURATION: 98 % | DIASTOLIC BLOOD PRESSURE: 61 MMHG

## 2025-06-17 VITALS — DIASTOLIC BLOOD PRESSURE: 78 MMHG | SYSTOLIC BLOOD PRESSURE: 105 MMHG

## 2025-06-17 VITALS — BODY MASS INDEX: 24.73 KG/M2 | WEIGHT: 182.38 LBS

## 2025-06-17 PROBLEM — R63.4 WEIGHT LOSS: Status: ACTIVE | Noted: 2025-06-12

## 2025-06-17 PROCEDURE — G2211 COMPLEX E/M VISIT ADD ON: CPT

## 2025-06-17 PROCEDURE — 99214 OFFICE O/P EST MOD 30 MIN: CPT

## 2025-06-17 PROCEDURE — 99215 OFFICE O/P EST HI 40 MIN: CPT | Mod: 25

## 2025-06-17 PROCEDURE — 93000 ELECTROCARDIOGRAM COMPLETE: CPT

## 2025-06-20 ENCOUNTER — APPOINTMENT (OUTPATIENT)
Dept: CT IMAGING | Facility: CLINIC | Age: 70
End: 2025-06-20
Payer: MEDICARE

## 2025-06-20 PROCEDURE — 74177 CT ABD & PELVIS W/CONTRAST: CPT

## 2025-06-20 PROCEDURE — 71250 CT THORAX DX C-: CPT

## 2025-06-21 PROBLEM — K63.9 COLON WALL THICKENING: Status: ACTIVE | Noted: 2025-06-21

## 2025-07-02 NOTE — PROGRESS NOTE ADULT - TIME-BASED BILLING (NON-CRITICAL CARE)
No care due was identified.  [facility] Embedded Care Due Messages. Reference number: [id number].   7/02/2025 9:32:30 AM CDT  
Time-based billing (NON-critical care)

## 2025-07-08 ENCOUNTER — APPOINTMENT (OUTPATIENT)
Dept: ELECTROPHYSIOLOGY | Facility: CLINIC | Age: 70
End: 2025-07-08
Payer: MEDICARE

## 2025-07-08 ENCOUNTER — NON-APPOINTMENT (OUTPATIENT)
Age: 70
End: 2025-07-08

## 2025-07-08 PROCEDURE — 93298 REM INTERROG DEV EVAL SCRMS: CPT

## 2025-07-10 ENCOUNTER — APPOINTMENT (OUTPATIENT)
Dept: ELECTROPHYSIOLOGY | Facility: CLINIC | Age: 70
End: 2025-07-10

## 2025-07-24 ENCOUNTER — NON-APPOINTMENT (OUTPATIENT)
Age: 70
End: 2025-07-24

## 2025-07-24 NOTE — PATIENT PROFILE ADULT - ...
Medication:   Requested Prescriptions     Pending Prescriptions Disp Refills    famotidine (PEPCID) 20 MG tablet 180 tablet 3     Sig: TAKE 1 TABLET BY MOUTH TWICE DAILY AS NEEDED FOR GERD       Last Filled:      Patient Phone Number: [phone] (home)     Last appt: 2/6/2024   Next appt: 9/2/2025  Future Appointments   Date Time Provider Department Center   9/2/2025  3:00 PM John Knott MD [facility] DEP       
07-Dec-2024 23:46:14

## 2025-07-25 ENCOUNTER — APPOINTMENT (OUTPATIENT)
Dept: GASTROENTEROLOGY | Facility: HOSPITAL | Age: 70
End: 2025-07-25

## 2025-08-01 ENCOUNTER — APPOINTMENT (OUTPATIENT)
Dept: PULMONOLOGY | Facility: CLINIC | Age: 70
End: 2025-08-01
Payer: MEDICARE

## 2025-08-01 VITALS
WEIGHT: 185 LBS | RESPIRATION RATE: 16 BRPM | TEMPERATURE: 97.9 F | DIASTOLIC BLOOD PRESSURE: 64 MMHG | OXYGEN SATURATION: 97 % | HEIGHT: 72 IN | HEART RATE: 62 BPM | BODY MASS INDEX: 25.06 KG/M2 | SYSTOLIC BLOOD PRESSURE: 112 MMHG

## 2025-08-01 DIAGNOSIS — E55.9 VITAMIN D DEFICIENCY, UNSPECIFIED: ICD-10-CM

## 2025-08-01 DIAGNOSIS — K21.9 GASTRO-ESOPHAGEAL REFLUX DISEASE W/OUT ESOPHAGITIS: ICD-10-CM

## 2025-08-01 PROCEDURE — 99214 OFFICE O/P EST MOD 30 MIN: CPT | Mod: 25

## 2025-08-01 PROCEDURE — 95012 NITRIC OXIDE EXP GAS DETER: CPT

## 2025-08-01 PROCEDURE — 94010 BREATHING CAPACITY TEST: CPT

## 2025-08-11 ENCOUNTER — APPOINTMENT (OUTPATIENT)
Dept: CARDIOLOGY | Facility: CLINIC | Age: 70
End: 2025-08-11

## 2025-08-12 ENCOUNTER — APPOINTMENT (OUTPATIENT)
Dept: ELECTROPHYSIOLOGY | Facility: CLINIC | Age: 70
End: 2025-08-12
Payer: MEDICARE

## 2025-08-12 ENCOUNTER — NON-APPOINTMENT (OUTPATIENT)
Age: 70
End: 2025-08-12

## 2025-08-12 PROCEDURE — 93298 REM INTERROG DEV EVAL SCRMS: CPT

## 2025-09-12 ENCOUNTER — APPOINTMENT (OUTPATIENT)
Dept: INTERNAL MEDICINE | Facility: CLINIC | Age: 70
End: 2025-09-12
Payer: MEDICARE

## 2025-09-12 VITALS
WEIGHT: 186 LBS | HEART RATE: 77 BPM | TEMPERATURE: 97 F | BODY MASS INDEX: 25.19 KG/M2 | HEIGHT: 72 IN | DIASTOLIC BLOOD PRESSURE: 80 MMHG | SYSTOLIC BLOOD PRESSURE: 126 MMHG | RESPIRATION RATE: 18 BRPM | OXYGEN SATURATION: 98 %

## 2025-09-12 DIAGNOSIS — I50.20 UNSPECIFIED SYSTOLIC (CONGESTIVE) HEART FAILURE: ICD-10-CM

## 2025-09-12 DIAGNOSIS — I34.0 NONRHEUMATIC MITRAL (VALVE) INSUFFICIENCY: ICD-10-CM

## 2025-09-12 DIAGNOSIS — Z12.9 ENCOUNTER FOR SCREENING FOR MALIGNANT NEOPLASM, SITE UNSPECIFIED: ICD-10-CM

## 2025-09-12 DIAGNOSIS — K63.9 DISEASE OF INTESTINE, UNSPECIFIED: ICD-10-CM

## 2025-09-12 DIAGNOSIS — R59.0 LOCALIZED ENLARGED LYMPH NODES: ICD-10-CM

## 2025-09-12 DIAGNOSIS — R13.12 DYSPHAGIA, OROPHARYNGEAL PHASE: ICD-10-CM

## 2025-09-12 DIAGNOSIS — D64.9 ANEMIA, UNSPECIFIED: ICD-10-CM

## 2025-09-12 DIAGNOSIS — Z86.79 OTHER SPECIFIED POSTPROCEDURAL STATES: ICD-10-CM

## 2025-09-12 DIAGNOSIS — G47.33 OBSTRUCTIVE SLEEP APNEA (ADULT) (PEDIATRIC): ICD-10-CM

## 2025-09-12 DIAGNOSIS — R68.89 OTHER GENERAL SYMPTOMS AND SIGNS: ICD-10-CM

## 2025-09-12 DIAGNOSIS — K21.9 GASTRO-ESOPHAGEAL REFLUX DISEASE W/OUT ESOPHAGITIS: ICD-10-CM

## 2025-09-12 DIAGNOSIS — R93.1 ABNORMAL FINDINGS ON DIAGNOSTIC IMAGING OF HEART AND CORONARY CIRCULATION: ICD-10-CM

## 2025-09-12 DIAGNOSIS — E11.9 TYPE 2 DIABETES MELLITUS W/OUT COMPLICATIONS: ICD-10-CM

## 2025-09-12 DIAGNOSIS — I48.0 PAROXYSMAL ATRIAL FIBRILLATION: ICD-10-CM

## 2025-09-12 DIAGNOSIS — I48.92 UNSPECIFIED ATRIAL FLUTTER: ICD-10-CM

## 2025-09-12 DIAGNOSIS — R22.31 LOCALIZED SWELLING, MASS AND LUMP, RIGHT UPPER LIMB: ICD-10-CM

## 2025-09-12 DIAGNOSIS — H90.3 SENSORINEURAL HEARING LOSS, BILATERAL: ICD-10-CM

## 2025-09-12 DIAGNOSIS — R63.4 ABNORMAL WEIGHT LOSS: ICD-10-CM

## 2025-09-12 DIAGNOSIS — Z98.890 OTHER SPECIFIED POSTPROCEDURAL STATES: ICD-10-CM

## 2025-09-12 PROCEDURE — G2211 COMPLEX E/M VISIT ADD ON: CPT

## 2025-09-12 PROCEDURE — 99214 OFFICE O/P EST MOD 30 MIN: CPT

## 2025-09-12 PROCEDURE — 93000 ELECTROCARDIOGRAM COMPLETE: CPT

## 2025-09-12 RX ORDER — EMPAGLIFLOZIN 10 MG/1
10 TABLET, FILM COATED ORAL DAILY
Qty: 1 | Refills: 1 | Status: ACTIVE | COMMUNITY
Start: 2025-09-12 | End: 1900-01-01

## 2025-09-13 PROBLEM — I50.20 SYSTOLIC CHF: Status: ACTIVE | Noted: 2024-11-22

## 2025-09-15 ENCOUNTER — TRANSCRIPTION ENCOUNTER (OUTPATIENT)
Age: 70
End: 2025-09-15

## 2025-09-15 DIAGNOSIS — R79.89 OTHER SPECIFIED ABNORMAL FINDINGS OF BLOOD CHEMISTRY: ICD-10-CM

## 2025-09-15 LAB
ALBUMIN SERPL ELPH-MCNC: 4.3 G/DL
ALP BLD-CCNC: 91 U/L
ALT SERPL-CCNC: 19 U/L
ANION GAP SERPL CALC-SCNC: 14 MMOL/L
AST SERPL-CCNC: 22 U/L
BASOPHILS # BLD AUTO: 0.03 K/UL
BASOPHILS NFR BLD AUTO: 0.5 %
BILIRUB SERPL-MCNC: 0.6 MG/DL
BUN SERPL-MCNC: 18 MG/DL
CALCIUM SERPL-MCNC: 9.5 MG/DL
CHLORIDE SERPL-SCNC: 109 MMOL/L
CHOLEST SERPL-MCNC: 201 MG/DL
CK SERPL-CCNC: 158 U/L
CO2 SERPL-SCNC: 23 MMOL/L
CREAT SERPL-MCNC: 0.87 MG/DL
EGFRCR SERPLBLD CKD-EPI 2021: 92 ML/MIN/1.73M2
EOSINOPHIL # BLD AUTO: 0.1 K/UL
EOSINOPHIL NFR BLD AUTO: 1.6 %
ESTIMATED AVERAGE GLUCOSE: 131 MG/DL
FERRITIN SERPL-MCNC: 143 NG/ML
FOLATE SERPL-MCNC: 16.3 NG/ML
GLUCOSE SERPL-MCNC: 85 MG/DL
HBA1C MFR BLD HPLC: 6.2 %
HCT VFR BLD CALC: 41.4 %
HDLC SERPL-MCNC: 38 MG/DL
HGB BLD-MCNC: 13.1 G/DL
IMM GRANULOCYTES NFR BLD AUTO: 0.2 %
IRON SATN MFR SERPL: 41 %
IRON SERPL-MCNC: 140 UG/DL
LDLC SERPL-MCNC: 124 MG/DL
LYMPHOCYTES # BLD AUTO: 1.65 K/UL
LYMPHOCYTES NFR BLD AUTO: 25.8 %
MAN DIFF?: NORMAL
MCHC RBC-ENTMCNC: 27.6 PG
MCHC RBC-ENTMCNC: 31.6 G/DL
MCV RBC AUTO: 87.3 FL
MONOCYTES # BLD AUTO: 0.71 K/UL
MONOCYTES NFR BLD AUTO: 11.1 %
NEUTROPHILS # BLD AUTO: 3.89 K/UL
NEUTROPHILS NFR BLD AUTO: 60.8 %
NONHDLC SERPL-MCNC: 164 MG/DL
NT-PROBNP SERPL-MCNC: 106 PG/ML
PLATELET # BLD AUTO: 239 K/UL
POTASSIUM SERPL-SCNC: 4.9 MMOL/L
PROT SERPL-MCNC: 7 G/DL
RBC # BLD: 4.74 M/UL
RBC # FLD: 13.8 %
SODIUM SERPL-SCNC: 146 MMOL/L
T4 FREE SERPL-MCNC: 1.2 NG/DL
TIBC SERPL-MCNC: 342 UG/DL
TRIGL SERPL-MCNC: 222 MG/DL
TSH SERPL-ACNC: 0.12 UIU/ML
UIBC SERPL-MCNC: 202 UG/DL
VIT B12 SERPL-MCNC: 189 PG/ML
WBC # FLD AUTO: 6.39 K/UL

## 2025-09-16 ENCOUNTER — NON-APPOINTMENT (OUTPATIENT)
Age: 70
End: 2025-09-16

## 2025-09-16 ENCOUNTER — APPOINTMENT (OUTPATIENT)
Dept: ELECTROPHYSIOLOGY | Facility: CLINIC | Age: 70
End: 2025-09-16
Payer: MEDICARE

## 2025-09-16 PROCEDURE — 93298 REM INTERROG DEV EVAL SCRMS: CPT
